# Patient Record
Sex: MALE | Race: BLACK OR AFRICAN AMERICAN | NOT HISPANIC OR LATINO | URBAN - METROPOLITAN AREA
[De-identification: names, ages, dates, MRNs, and addresses within clinical notes are randomized per-mention and may not be internally consistent; named-entity substitution may affect disease eponyms.]

---

## 2017-02-20 ENCOUNTER — EMERGENCY (EMERGENCY)
Facility: HOSPITAL | Age: 52
LOS: 1 days | Discharge: HOME | End: 2017-02-20
Attending: EMERGENCY MEDICINE | Admitting: EMERGENCY MEDICINE
Payer: MEDICAID

## 2017-02-20 VITALS
SYSTOLIC BLOOD PRESSURE: 132 MMHG | RESPIRATION RATE: 18 BRPM | HEIGHT: 72 IN | DIASTOLIC BLOOD PRESSURE: 77 MMHG | TEMPERATURE: 98 F | WEIGHT: 183.65 LBS | HEART RATE: 115 BPM | OXYGEN SATURATION: 98 %

## 2017-02-20 VITALS
RESPIRATION RATE: 18 BRPM | TEMPERATURE: 98 F | OXYGEN SATURATION: 95 % | SYSTOLIC BLOOD PRESSURE: 115 MMHG | DIASTOLIC BLOOD PRESSURE: 65 MMHG | HEART RATE: 105 BPM

## 2017-02-20 DIAGNOSIS — R05 COUGH: ICD-10-CM

## 2017-02-20 DIAGNOSIS — R10.13 EPIGASTRIC PAIN: ICD-10-CM

## 2017-02-20 DIAGNOSIS — K92.0 HEMATEMESIS: ICD-10-CM

## 2017-02-20 DIAGNOSIS — F10.20 ALCOHOL DEPENDENCE, UNCOMPLICATED: ICD-10-CM

## 2017-02-20 LAB
ALBUMIN SERPL ELPH-MCNC: 3.2 G/DL — LOW (ref 3.4–5)
ALP SERPL-CCNC: 94 U/L — SIGNIFICANT CHANGE UP (ref 40–120)
ALT FLD-CCNC: 52 U/L — HIGH (ref 12–42)
ANION GAP SERPL CALC-SCNC: 12 MMOL/L — SIGNIFICANT CHANGE UP (ref 9–16)
AST SERPL-CCNC: 74 U/L — HIGH (ref 15–37)
BASOPHILS NFR BLD AUTO: 0.3 % — SIGNIFICANT CHANGE UP (ref 0–2)
BILIRUB SERPL-MCNC: 0.6 MG/DL — SIGNIFICANT CHANGE UP (ref 0.2–1.2)
BUN SERPL-MCNC: 7 MG/DL — SIGNIFICANT CHANGE UP (ref 7–23)
CALCIUM SERPL-MCNC: 8.4 MG/DL — LOW (ref 8.5–10.5)
CHLORIDE SERPL-SCNC: 103 MMOL/L — SIGNIFICANT CHANGE UP (ref 96–108)
CO2 SERPL-SCNC: 23 MMOL/L — SIGNIFICANT CHANGE UP (ref 22–31)
CREAT SERPL-MCNC: 0.65 MG/DL — SIGNIFICANT CHANGE UP (ref 0.5–1.3)
EOSINOPHIL NFR BLD AUTO: 1 % — SIGNIFICANT CHANGE UP (ref 0–6)
GLUCOSE SERPL-MCNC: 96 MG/DL — SIGNIFICANT CHANGE UP (ref 70–99)
HCT VFR BLD CALC: 37.6 % — LOW (ref 39–50)
HCT VFR BLD CALC: 37.6 % — LOW (ref 39–50)
HGB BLD-MCNC: 12.8 G/DL — LOW (ref 13–17)
HGB BLD-MCNC: 12.8 G/DL — LOW (ref 13–17)
LIDOCAIN IGE QN: 186 U/L — SIGNIFICANT CHANGE UP (ref 73–393)
LYMPHOCYTES # BLD AUTO: 14.7 % — SIGNIFICANT CHANGE UP (ref 13–44)
MCHC RBC-ENTMCNC: 32.5 PG — SIGNIFICANT CHANGE UP (ref 27–34)
MCHC RBC-ENTMCNC: 32.8 PG — SIGNIFICANT CHANGE UP (ref 27–34)
MCHC RBC-ENTMCNC: 34 G/DL — SIGNIFICANT CHANGE UP (ref 32–36)
MCHC RBC-ENTMCNC: 34 G/DL — SIGNIFICANT CHANGE UP (ref 32–36)
MCV RBC AUTO: 95.4 FL — SIGNIFICANT CHANGE UP (ref 80–100)
MCV RBC AUTO: 96.4 FL — SIGNIFICANT CHANGE UP (ref 80–100)
MONOCYTES NFR BLD AUTO: 7.4 % — SIGNIFICANT CHANGE UP (ref 2–14)
NEUTROPHILS NFR BLD AUTO: 76.6 % — SIGNIFICANT CHANGE UP (ref 43–77)
PLATELET # BLD AUTO: 187 K/UL — SIGNIFICANT CHANGE UP (ref 150–400)
PLATELET # BLD AUTO: 190 K/UL — SIGNIFICANT CHANGE UP (ref 150–400)
POTASSIUM SERPL-MCNC: 3.5 MMOL/L — SIGNIFICANT CHANGE UP (ref 3.5–5.3)
POTASSIUM SERPL-SCNC: 3.5 MMOL/L — SIGNIFICANT CHANGE UP (ref 3.5–5.3)
PROT SERPL-MCNC: 7.4 G/DL — SIGNIFICANT CHANGE UP (ref 6.4–8.2)
RBC # BLD: 3.9 M/UL — LOW (ref 4.2–5.8)
RBC # BLD: 3.94 M/UL — LOW (ref 4.2–5.8)
RBC # FLD: 14.6 % — SIGNIFICANT CHANGE UP (ref 10.3–16.9)
RBC # FLD: 14.9 % — SIGNIFICANT CHANGE UP (ref 10.3–16.9)
SODIUM SERPL-SCNC: 138 MMOL/L — SIGNIFICANT CHANGE UP (ref 135–145)
WBC # BLD: 10.1 K/UL — SIGNIFICANT CHANGE UP (ref 3.8–10.5)
WBC # BLD: 8.9 K/UL — SIGNIFICANT CHANGE UP (ref 3.8–10.5)
WBC # FLD AUTO: 10.1 K/UL — SIGNIFICANT CHANGE UP (ref 3.8–10.5)
WBC # FLD AUTO: 8.9 K/UL — SIGNIFICANT CHANGE UP (ref 3.8–10.5)

## 2017-02-20 PROCEDURE — 99284 EMERGENCY DEPT VISIT MOD MDM: CPT | Mod: 25

## 2017-02-20 PROCEDURE — 80053 COMPREHEN METABOLIC PANEL: CPT

## 2017-02-20 PROCEDURE — 85025 COMPLETE CBC W/AUTO DIFF WBC: CPT

## 2017-02-20 PROCEDURE — 96374 THER/PROPH/DIAG INJ IV PUSH: CPT

## 2017-02-20 PROCEDURE — 99053 MED SERV 10PM-8AM 24 HR FAC: CPT

## 2017-02-20 PROCEDURE — 85027 COMPLETE CBC AUTOMATED: CPT

## 2017-02-20 PROCEDURE — 83690 ASSAY OF LIPASE: CPT

## 2017-02-20 PROCEDURE — 36415 COLL VENOUS BLD VENIPUNCTURE: CPT

## 2017-02-20 RX ORDER — SODIUM CHLORIDE 9 MG/ML
1000 INJECTION INTRAMUSCULAR; INTRAVENOUS; SUBCUTANEOUS ONCE
Qty: 0 | Refills: 0 | Status: COMPLETED | OUTPATIENT
Start: 2017-02-20 | End: 2017-02-20

## 2017-02-20 RX ORDER — PANTOPRAZOLE SODIUM 20 MG/1
40 TABLET, DELAYED RELEASE ORAL ONCE
Qty: 0 | Refills: 0 | Status: COMPLETED | OUTPATIENT
Start: 2017-02-20 | End: 2017-02-20

## 2017-02-20 RX ADMIN — SODIUM CHLORIDE 2000 MILLILITER(S): 9 INJECTION INTRAMUSCULAR; INTRAVENOUS; SUBCUTANEOUS at 05:23

## 2017-02-20 RX ADMIN — PANTOPRAZOLE SODIUM 40 MILLIGRAM(S): 20 TABLET, DELAYED RELEASE ORAL at 05:23

## 2017-02-20 NOTE — ED PROVIDER NOTE - OBJECTIVE STATEMENT
pt is 52 yr old m alcoholic, denies any other pmhx - c/o vomiting blood.  began around 2 pm yesterday (sunday) as small amt red blood in emesis.  he says he has vomited a total of 4 or 5 times throughout the day, the last time right before coming to the ER.  he says the last time seemed to be a lot of blood but could not quantify it.  he says he has some upper abdominal pain.  denies coffee ground emesis, and denies BRBPR or melena.  he also denies any chest pain or difficulty breathing.  no fever.  he denies any hx of cirrhosis or esophageal varices.  never had an egd/colonoscopy.  he falls asleep mid-sentence and frequently needs to be awakened.  he admits to drinking several beers tonight, and admits to being a little intoxicated.

## 2017-02-20 NOTE — ED PROVIDER NOTE - PROGRESS NOTE DETAILS
Mor: 53 yo prev healthy vomiting with blood in emesis. resting comfortably.  no vomiting/hematemesis since arriving in ED. Daniel: pt was received at sign out from LAMBERT Galloway as pending repeat cbc, to be d/c'd if unchanged; 8:45 am -- pt sleeping, c/o free, no hematemesis, cbc unchanged, pt woken up and agreeable w/d/c

## 2017-02-20 NOTE — ED ADULT NURSE NOTE - OBJECTIVE STATEMENT
pt c/o cough that started Sunday 2 pm and c/o spitting up blood , pt denies any SOB , no chest pain will no dizziness , no night sweats , will continue to monitor

## 2017-02-20 NOTE — ED PROVIDER NOTE - GASTROINTESTINAL, MLM
Abdomen soft, mild epigastric tenderness.  Rectal nontender, light brown stool on HASEEB, guaiac negative.

## 2017-02-20 NOTE — ED PROVIDER NOTE - MEDICAL DECISION MAKING DETAILS
51 yo m with hematemesis and mild epigastric pain.  Seems to be and admits to being somewhat intoxicated.   Abd mild epigastric tenderness.  Guaiac negative.  No evidence of massive GIB.  Will check labs and give PPI. Will allow to sleep and sober up.  If repeat CBC without signficant drop, can DC to follow up with GI as outpatient.

## 2017-02-20 NOTE — ED PROVIDER NOTE - ATTENDING CONTRIBUTION TO CARE
Pt brought in for complaints with blood in emesis, intoxicated appearing. Agree with PA exam as above. A/P: hematemesis, not active, sleeping comfortably in room. Will recheck CBC, if stable, can follow up with oupt GI.

## 2017-10-12 ENCOUNTER — EMERGENCY (EMERGENCY)
Facility: HOSPITAL | Age: 52
LOS: 1 days | Discharge: HOME | End: 2017-10-12
Attending: EMERGENCY MEDICINE | Admitting: EMERGENCY MEDICINE
Payer: SELF-PAY

## 2017-10-12 VITALS
HEART RATE: 98 BPM | SYSTOLIC BLOOD PRESSURE: 127 MMHG | RESPIRATION RATE: 20 BRPM | DIASTOLIC BLOOD PRESSURE: 77 MMHG | TEMPERATURE: 98 F | OXYGEN SATURATION: 93 %

## 2017-10-12 DIAGNOSIS — F10.129 ALCOHOL ABUSE WITH INTOXICATION, UNSPECIFIED: ICD-10-CM

## 2017-10-12 DIAGNOSIS — F17.200 NICOTINE DEPENDENCE, UNSPECIFIED, UNCOMPLICATED: ICD-10-CM

## 2017-10-12 DIAGNOSIS — S00.81XA ABRASION OF OTHER PART OF HEAD, INITIAL ENCOUNTER: ICD-10-CM

## 2017-10-12 DIAGNOSIS — S00.83XA CONTUSION OF OTHER PART OF HEAD, INITIAL ENCOUNTER: ICD-10-CM
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
## 2017-10-12 DIAGNOSIS — R41.82 ALTERED MENTAL STATUS, UNSPECIFIED: ICD-10-CM
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
## 2017-10-12 PROCEDURE — 99284 EMERGENCY DEPT VISIT MOD MDM: CPT

## 2017-10-12 RX ORDER — HALOPERIDOL DECANOATE 100 MG/ML
5 INJECTION INTRAMUSCULAR ONCE
Qty: 0 | Refills: 0 | Status: COMPLETED | OUTPATIENT
Start: 2017-10-12 | End: 2017-10-12

## 2017-10-12 RX ORDER — TETANUS TOXOID, REDUCED DIPHTHERIA TOXOID AND ACELLULAR PERTUSSIS VACCINE, ADSORBED 5; 2.5; 8; 8; 2.5 [IU]/.5ML; [IU]/.5ML; UG/.5ML; UG/.5ML; UG/.5ML
0.5 SUSPENSION INTRAMUSCULAR ONCE
Qty: 0 | Refills: 0 | Status: COMPLETED | OUTPATIENT
Start: 2017-10-12 | End: 2017-10-12

## 2017-10-12 RX ADMIN — TETANUS TOXOID, REDUCED DIPHTHERIA TOXOID AND ACELLULAR PERTUSSIS VACCINE, ADSORBED 0.5 MILLILITER(S): 5; 2.5; 8; 8; 2.5 SUSPENSION INTRAMUSCULAR at 19:14

## 2017-10-12 RX ADMIN — Medication 2 MILLIGRAM(S): at 20:23

## 2017-10-12 RX ADMIN — HALOPERIDOL DECANOATE 5 MILLIGRAM(S): 100 INJECTION INTRAMUSCULAR at 20:23

## 2017-10-12 NOTE — ED PROVIDER NOTE - ATTENDING CONTRIBUTION TO CARE
52 yom bibems for intox, admits to EtOH use, noted small L forehead hematoma.  limited history 2/2 mental status.    agree w/ PA, clinically intoxicated, scalp hematoma, will CT ro injury, protecting airway, will assess for sobriety. 52 yom bibems for intox, admits to EtOH use, noted small L forehead hematoma.  limited history 2/2 mental status.    agree w/ resident, clinically intoxicated, scalp hematoma, will CT ro injury, protecting airway, will assess for sobriety.

## 2017-10-12 NOTE — ED PROVIDER NOTE - PHYSICAL EXAMINATION
PERRLA, breathing comfortably sleeping on stretcher, no evidence of trauma, moves all extremities spontaneously PERRLA, breathing comfortably sleeping on stretcher, left subcutaneous hematoma over left forehead and left abrasion over left eyebrow, moves all extremities spontaneously

## 2017-10-12 NOTE — ED ADULT TRIAGE NOTE - CHIEF COMPLAINT QUOTE
Patient found sleeping on sidewalk by EMS Patient found sleeping on sidewalk by EMS. Suspicious for ETOH abuse.

## 2017-10-12 NOTE — ED PROVIDER NOTE - MEDICAL DECISION MAKING DETAILS
clinically intoxicated, ct as there's possible head trauma, protecting airway, reassess for sobriety

## 2017-10-12 NOTE — ED ADULT NURSE NOTE - OBJECTIVE STATEMENT
Patient presents to the ED with ems, intoxicated, alcohol on breath. Patient noted to have hematoma to right side of head. Arousable to verbal stimuli. Patient presents to the ED with ems, intoxicated, alcohol on breath. Patient noted to have hematoma to right side of head. Arousable to verbal stimuli, slurring speech, reports drinking Budweiser beer.

## 2017-10-12 NOTE — ED PROVIDER NOTE - OBJECTIVE STATEMENT
52M with hx of EtOh abuse. Found sleeping on the street. Brought in for EtOH intoxication. 52M with hx of EtOh abuse. Found sleeping on the street. Brought in for EtOH intoxication. Denies falls, but with eveidence of trauma on his forehead.

## 2017-10-13 VITALS
OXYGEN SATURATION: 95 % | TEMPERATURE: 97 F | HEART RATE: 88 BPM | RESPIRATION RATE: 18 BRPM | SYSTOLIC BLOOD PRESSURE: 122 MMHG | DIASTOLIC BLOOD PRESSURE: 68 MMHG

## 2017-10-13 PROCEDURE — 70450 CT HEAD/BRAIN W/O DYE: CPT | Mod: 26

## 2017-10-13 PROCEDURE — 90715 TDAP VACCINE 7 YRS/> IM: CPT

## 2017-10-13 PROCEDURE — 90471 IMMUNIZATION ADMIN: CPT

## 2017-10-13 PROCEDURE — 96372 THER/PROPH/DIAG INJ SC/IM: CPT

## 2017-10-13 PROCEDURE — 99285 EMERGENCY DEPT VISIT HI MDM: CPT | Mod: 25

## 2017-10-13 PROCEDURE — 82962 GLUCOSE BLOOD TEST: CPT

## 2017-10-13 PROCEDURE — 70450 CT HEAD/BRAIN W/O DYE: CPT

## 2017-10-13 NOTE — ED ADULT NURSE REASSESSMENT NOTE - NS ED NURSE REASSESS COMMENT FT1
pt now aaox3 no deficits no sob no chest pain no n/v.  gait steady.  speech clear.
pt remains asleep.  no distress.
pt returned from ct scan awake with slurred speech.  pt climbing off stretcher.  unable to redirect.  security at bedside.  md made aware.  pt medicated and wrists restraints applied.
pt to ct scan.  awake and at times not following directions.  nurse with pt.
restraints released.  pt asleep.

## 2018-08-09 ENCOUNTER — EMERGENCY (EMERGENCY)
Facility: HOSPITAL | Age: 53
LOS: 1 days | Discharge: LEFT BEFORE DISCHARGE | End: 2018-08-09
Attending: EMERGENCY MEDICINE | Admitting: EMERGENCY MEDICINE
Payer: MEDICAID

## 2018-08-09 VITALS
OXYGEN SATURATION: 96 % | HEART RATE: 115 BPM | SYSTOLIC BLOOD PRESSURE: 149 MMHG | DIASTOLIC BLOOD PRESSURE: 102 MMHG | WEIGHT: 217.6 LBS | RESPIRATION RATE: 22 BRPM | TEMPERATURE: 98 F

## 2018-08-09 PROCEDURE — 99283 EMERGENCY DEPT VISIT LOW MDM: CPT

## 2018-08-09 RX ORDER — SODIUM CHLORIDE 9 MG/ML
1000 INJECTION INTRAMUSCULAR; INTRAVENOUS; SUBCUTANEOUS ONCE
Qty: 0 | Refills: 0 | Status: DISCONTINUED | OUTPATIENT
Start: 2018-08-09 | End: 2018-08-13

## 2018-08-09 NOTE — ED PROVIDER NOTE - OBJECTIVE STATEMENT
53 m eloped prior to eval- states I need air for a cigarette, pleaded w px to stay, says he will return  admits to drinking 2 beers- then stood up and ambulated out w stable gait  was not evaluated fully

## 2018-08-10 ENCOUNTER — INPATIENT (INPATIENT)
Facility: HOSPITAL | Age: 53
LOS: 0 days | Discharge: LEFT BEFORE DISCHARGE | DRG: 558 | End: 2018-08-10
Attending: INTERNAL MEDICINE | Admitting: INTERNAL MEDICINE
Payer: MEDICAID

## 2018-08-10 VITALS
DIASTOLIC BLOOD PRESSURE: 89 MMHG | OXYGEN SATURATION: 96 % | TEMPERATURE: 98 F | HEART RATE: 116 BPM | RESPIRATION RATE: 18 BRPM | SYSTOLIC BLOOD PRESSURE: 143 MMHG

## 2018-08-10 VITALS — TEMPERATURE: 98 F

## 2018-08-10 DIAGNOSIS — F10.20 ALCOHOL DEPENDENCE, UNCOMPLICATED: ICD-10-CM

## 2018-08-10 DIAGNOSIS — R07.9 CHEST PAIN, UNSPECIFIED: ICD-10-CM

## 2018-08-10 DIAGNOSIS — M62.82 RHABDOMYOLYSIS: ICD-10-CM

## 2018-08-10 LAB
CK MB CFR SERPL CALC: 69.7 NG/ML — HIGH (ref 0–6.7)
TROPONIN T SERPL-MCNC: <0.01 NG/ML — SIGNIFICANT CHANGE UP (ref 0–0.01)

## 2018-08-10 PROCEDURE — 36415 COLL VENOUS BLD VENIPUNCTURE: CPT

## 2018-08-10 PROCEDURE — 80048 BASIC METABOLIC PNL TOTAL CA: CPT

## 2018-08-10 PROCEDURE — 80053 COMPREHEN METABOLIC PANEL: CPT

## 2018-08-10 PROCEDURE — 85610 PROTHROMBIN TIME: CPT

## 2018-08-10 PROCEDURE — 99285 EMERGENCY DEPT VISIT HI MDM: CPT | Mod: 25

## 2018-08-10 PROCEDURE — 84484 ASSAY OF TROPONIN QUANT: CPT

## 2018-08-10 PROCEDURE — 85025 COMPLETE CBC W/AUTO DIFF WBC: CPT

## 2018-08-10 PROCEDURE — 71046 X-RAY EXAM CHEST 2 VIEWS: CPT | Mod: 26

## 2018-08-10 PROCEDURE — 71046 X-RAY EXAM CHEST 2 VIEWS: CPT

## 2018-08-10 PROCEDURE — 93005 ELECTROCARDIOGRAM TRACING: CPT

## 2018-08-10 PROCEDURE — 93010 ELECTROCARDIOGRAM REPORT: CPT

## 2018-08-10 PROCEDURE — 99223 1ST HOSP IP/OBS HIGH 75: CPT | Mod: AI

## 2018-08-10 PROCEDURE — 82803 BLOOD GASES ANY COMBINATION: CPT

## 2018-08-10 PROCEDURE — 85730 THROMBOPLASTIN TIME PARTIAL: CPT

## 2018-08-10 PROCEDURE — 82550 ASSAY OF CK (CPK): CPT

## 2018-08-10 PROCEDURE — 80307 DRUG TEST PRSMV CHEM ANLYZR: CPT

## 2018-08-10 PROCEDURE — 82553 CREATINE MB FRACTION: CPT

## 2018-08-10 PROCEDURE — 83735 ASSAY OF MAGNESIUM: CPT

## 2018-08-10 PROCEDURE — 83690 ASSAY OF LIPASE: CPT

## 2018-08-10 RX ORDER — SODIUM CHLORIDE 9 MG/ML
1000 INJECTION INTRAMUSCULAR; INTRAVENOUS; SUBCUTANEOUS
Qty: 0 | Refills: 0 | Status: DISCONTINUED | OUTPATIENT
Start: 2018-08-10 | End: 2018-08-10

## 2018-08-10 RX ORDER — FOLIC ACID 0.8 MG
1 TABLET ORAL
Qty: 0 | Refills: 0 | COMMUNITY
Start: 2018-08-10

## 2018-08-10 RX ORDER — FOLIC ACID 0.8 MG
1 TABLET ORAL DAILY
Qty: 0 | Refills: 0 | Status: DISCONTINUED | OUTPATIENT
Start: 2018-08-10 | End: 2018-08-10

## 2018-08-10 RX ORDER — HEPARIN SODIUM 5000 [USP'U]/ML
5000 INJECTION INTRAVENOUS; SUBCUTANEOUS EVERY 8 HOURS
Qty: 0 | Refills: 0 | Status: DISCONTINUED | OUTPATIENT
Start: 2018-08-10 | End: 2018-08-10

## 2018-08-10 RX ORDER — THIAMINE MONONITRATE (VIT B1) 100 MG
100 TABLET ORAL ONCE
Qty: 0 | Refills: 0 | Status: DISCONTINUED | OUTPATIENT
Start: 2018-08-10 | End: 2018-08-10

## 2018-08-10 RX ORDER — THIAMINE MONONITRATE (VIT B1) 100 MG
1 TABLET ORAL
Qty: 0 | Refills: 0 | COMMUNITY
Start: 2018-08-10

## 2018-08-10 RX ORDER — THIAMINE MONONITRATE (VIT B1) 100 MG
100 TABLET ORAL DAILY
Qty: 0 | Refills: 0 | Status: DISCONTINUED | OUTPATIENT
Start: 2018-08-10 | End: 2018-08-10

## 2018-08-10 RX ORDER — FOLIC ACID 0.8 MG
1 TABLET ORAL ONCE
Qty: 0 | Refills: 0 | Status: DISCONTINUED | OUTPATIENT
Start: 2018-08-10 | End: 2018-08-10

## 2018-08-10 RX ORDER — SODIUM CHLORIDE 9 MG/ML
1500 INJECTION INTRAMUSCULAR; INTRAVENOUS; SUBCUTANEOUS ONCE
Qty: 0 | Refills: 0 | Status: COMPLETED | OUTPATIENT
Start: 2018-08-10 | End: 2018-08-10

## 2018-08-10 RX ORDER — SODIUM CHLORIDE 9 MG/ML
1000 INJECTION INTRAMUSCULAR; INTRAVENOUS; SUBCUTANEOUS ONCE
Qty: 0 | Refills: 0 | Status: COMPLETED | OUTPATIENT
Start: 2018-08-10 | End: 2018-08-10

## 2018-08-10 RX ADMIN — SODIUM CHLORIDE 1500 MILLILITER(S): 9 INJECTION INTRAMUSCULAR; INTRAVENOUS; SUBCUTANEOUS at 03:20

## 2018-08-10 RX ADMIN — SODIUM CHLORIDE 250 MILLILITER(S): 9 INJECTION INTRAMUSCULAR; INTRAVENOUS; SUBCUTANEOUS at 05:29

## 2018-08-10 RX ADMIN — SODIUM CHLORIDE 1000 MILLILITER(S): 9 INJECTION INTRAMUSCULAR; INTRAVENOUS; SUBCUTANEOUS at 03:19

## 2018-08-10 NOTE — ED PROVIDER NOTE - ATTENDING CONTRIBUTION TO CARE
53M hx etoh abuse, c/o chest pain. pt admits to drinking tonight.  no SOB. no vomiting.  pt triaged yesterday for chest pain as well, however left without being seen.   gen- nad  heent- ncat, clear conj  cv -rrr  lungs -ctab  abd - soft, nt, nd  ext -wwp  neuro -esparza, wakes to touch  no acute st/t wave changes on EKG, elevated cpk c/w rhabdo.  giving iv hydration, will admit for continued monitoring

## 2018-08-10 NOTE — H&P ADULT - NSHPLABSRESULTS_GEN_ALL_CORE
14.3   9.0   )-----------( 344      ( 10 Aug 2018 02:37 )             40.9       08-10    139  |  101  |  6<L>  ----------------------------<  116<H>  4.3   |  19<L>  |  0.74    Ca    8.1<L>      10 Aug 2018 04:34  Mg     2.3     08-10    TPro  8.0  /  Alb  4.4  /  TBili  0.3  /  DBili  x   /  AST  176<H>  /  ALT  44  /  AlkPhos  82  08-10      PT/INR - ( 10 Aug 2018 02:37 )   PT: 10.4 sec;   INR: 0.94          PTT - ( 10 Aug 2018 02:37 )  PTT:29.7 sec    CARDIAC MARKERS ( 10 Aug 2018 04:34 )  x     / x     / 9629 U/L / x     / x      CARDIAC MARKERS ( 10 Aug 2018 02:37 )  x     / 0.02 ng/mL / 22359 U/L / x     / 77.0 ng/mL            EKG: Sinus tach at 107 BPM

## 2018-08-10 NOTE — DISCHARGE NOTE ADULT - HOSPITAL COURSE
52 y/o male POOR HISTORIAN, heavy ETOH user, current smoker,  undomiciled who presented to [facility] ED on 8/10 AM with c/o non-radiating SSCP described as tightness of 5/10 intensity with associated full body aches and increased fatigue whenever he walks x 1 day.   Pt was recently discharged from [facility] ER on 8/9/18 at 5P after he presented to ED with c/o CP but with normal EKG and labs WNL. Patient reports after being discharged , he then walked back and forth between hospital and [address] [place] [address] while drinking beer and smoking cigarettes and then called 911 after again developing CP with new onset body aches and fatigue. Patient noted to be drinking Budweiser can in ED and smoking a cigarette in bathroom overnight which ER staff discarded.  Denies SOB, dizziness, diaphoresis, palpitations, orthopnea, PND, LE edema, syncope, N/V, abdominal pain. Upon admit, temp 98F,  BPM, /89, RR 18, SpO2 96% on RA. [place] , [place] [address], CKMB 77, trop 0.02, alcohol level 291.  ECG revealed sinus tach at 107 BPM without any ST segment changes or TWI. While in ED, patient received IV NS 2.5 L bolus and started on IV NS @ 250 cc/hr x 4 hours. Patient admitted to 5U to r/o ACS, ECHO and treatment of rhabdomyolysis. 2nd trop 0.01; [place] [address], CKMB 69.7. Pt. seen and examined at bedside today am. Pt. denies any CP, SOB, dizziness, palpitations, b/l hand tremors noted on extending the hands. VS HR ; //88). Also, pt. found to be smoking in the bathroom today am. During morning rounds, pt. was seen leaving the hospital fully dressed; pt. was then approached and said that he wanted to leave; he signed out against medical advice; risk and benefits of leaving AMA was explained to pt. which he states he understands and he expressed he wanted to leave without any discharge papers.

## 2018-08-10 NOTE — H&P ADULT - RS GEN PE MLT RESP DETAILS PC
Mild rales to LLL, right lung CTA without w/r/r normal/Mild rales to LLL, right lung CTA without w/r/r/airway patent/good air movement/breath sounds equal

## 2018-08-10 NOTE — H&P ADULT - PROBLEM SELECTOR PLAN 2
Pt with complaints of [place] [address], CKMB 77  - f/u urine lytes, myoglobin  - Alcohol level 291, f/u Utox  - Pt received 2.5 L IV NS in ED and started on IV NS @ 250 cc/hr x 4 hours  - f/u repeat CK, CKMB at AM, 10am Pt with complaints of body aches [place]  - , [place] [address], CKMB 77  - f/u urine lytes, myoglobin  - Alcohol level 291, f/u Utox  - Pt received 2.5 L IV NS in ED and started on IV NS @ 250 cc/hr x 4 hours  - f/u repeat CK, CKMB at AM, 10am

## 2018-08-10 NOTE — ED PROVIDER NOTE - OBJECTIVE STATEMENT
patient returns to ED complains again of CP x unclarified  number of days after elope d/c earlier yesterday + admits to Smoking and drinking albeit no cardiac HX espoused. History and physical both limited by patient primarily sleeping and stating needing to sleep patient returns to ED complains again of CP x unclarified  number of days after elope d/c earlier yesterday + admits to Smoking and drinking albeit no cardiac HX espoused. Patient admits to full body aching whenever walks and as housing diplaced does walk increased amounts History and physical both limited by patient primarily sleeping and stating needing to sleep. patient returns to ED complains again of CP x unclarified  number of days after elope d/c earlier yesterday + admits to Smoking and drinking etoh albeit no cardiac HX espoused. Patient admits to full body aching whenever walks and as housing displaced does walk increased amounts History and physical both limited by patient primarily sleeping and stating needing to sleep.

## 2018-08-10 NOTE — H&P ADULT - HISTORY OF PRESENT ILLNESS
52 yo male POOR HISTORIAN, heavy ETOH user, undomiciled who presented to [facility] ED on 8/10 AM with c/o non-radiating SSCP described as tightness of 5/10 intensity with associated full body aches and increased fatigue whenever he walks x 1 day. Denies SOB, dizziness, diaphoresis, palpitations, orthopnea, PND, fatigue, LE edema, syncope, N/V, abdominal pain. Upon admit, temp 98F,  BPM, /89, RR 18, SpO2 96% [place] RA. [place] , [place] [address], CKMB 77, trop 0.02, alcohol level 291.  ECG revealed sinus tach at 107 BPM without any ST segment changes or TWI. While in ED, patient received IV NS 2.5 L bolus and started on IV NS @ 250 cc/hr x 4 hours. Patient admitted to 5U to r/o ACS, ECHO and treatment of rhabdomyolysis. 54 yo male POOR HISTORIAN, heavy ETOH user, current smoker,  undomiciled who presented to [facility] ED on 8/10 AM with c/o non-radiating SSCP described as tightness of 5/10 intensity with associated full body aches and increased fatigue whenever he walks x 1 day.   Pt was recently discharged from [facility] ER on 8/9/18 at 5P after he presented to ED with c/o CP but with normal EKG and labs WNL. Patient reports he then walked back and forth between hospital and [address] [place] [address] while drinking beer and smoking cigarettes and then called 911 after again developing CP with new onset body aches and fatigue. Patient noted to be drinking Budweiser can in ED and smoking a cigarette in bathroom overnight which ER staff discarded.  Denies SOB, dizziness, diaphoresis, palpitations, orthopnea, PND, LE edema, syncope, N/V, abdominal pain. Upon admit, temp 98F,  BPM, /89, RR 18, SpO2 96% [place] RA. [place] , [place] [address], CKMB 77, trop 0.02, alcohol level 291.  ECG revealed sinus tach at 107 BPM without any ST segment changes or TWI. While in ED, patient received IV NS 2.5 L bolus and started on IV NS @ 250 cc/hr x 4 hours. Patient admitted to 5U to r/o ACS, ECHO and treatment of rhabdomyolysis.

## 2018-08-10 NOTE — ED PROVIDER NOTE - CARE PLAN
Principal Discharge DX:	Atypical chest pain  Secondary Diagnosis:	Alcoholism /alcohol abuse Principal Discharge DX:	Rhabdomyolysis  Secondary Diagnosis:	Alcoholism /alcohol abuse

## 2018-08-10 NOTE — H&P ADULT - NSHPSOCIALHISTORY_GEN_ALL_CORE
Tiigi 36 May 14, 2018       RE: Fabiola Leaks      To Whom It May Concern,    This is to certify that Aston Randall was at the hospital on Monday May 14, 2018 with her brother who is critically ill. Please feel free to contact my office if you have any questions or concerns. Thank you for your assistance in this matter.       Sincerely,  Kim Weiner RN Current smoker, daily drinker. Denies elicit drug use.

## 2018-08-10 NOTE — DISCHARGE NOTE ADULT - CARE PLAN
Principal Discharge DX:	Discharge planning issues  Goal:	Patient signed out AMA before discharge papers given.

## 2018-08-10 NOTE — H&P ADULT - ATTENDING COMMENTS
Assessment: Patient personally seen and examined myself during rounds with the Physician Assistant/House Staff/Nurse Practitioner   ON DATE 8/10/18  Physician Assistant/House Staff/Nurse Practitioner note read, including vitals, physical findings, laboratory data, and radiological reports.   Revisions included below.   Direct personal management at bed side and extensive interpretation of the data.    Plan was outlined and discussed in details with the Physician Assistant/House Staff/Nurse Practitioner.    Decision making of high complexity   Risk high of complications, morbidity, and/or mortality  Assessment and Action taken for acute disease activity to reflect the level of care provided:  -Hemodynamic evaluation and support  -ACS assessment and treatment as applicable  -Heart failure assessment and treatment as applicable  -Cardiac Telemetry reviewed  -Medication reconciliation  -Review laboratory data  -EKG reviewed - no stemi  -Echo ordered  -Interdisciplinary discussion with IC / EP / HF / CTS teams as needed  TIME SPENT in evaluation and management, reassessments, review and interpretation of labs and x-rays, and hemodynamic management, formulating a plan and coordinating care: ___70____ MIN.  Time does not include procedural time.    Farhat Peoples MD  Cardiology    Mobile: [phone]  Office: [phone]  [address] [place], [place] [address]

## 2018-08-10 NOTE — DISCHARGE NOTE ADULT - PATIENT PORTAL LINK FT
You can access the [facility], offered by [facility], by registering with the following website: [email or web address]

## 2018-08-10 NOTE — H&P ADULT - PROBLEM SELECTOR PLAN 1
Currently CP free and hemodynamically stable  - No known cardiac history  - Trop 0.02. f/u CE AM, 10AM  - EKG revealed sinus tach at 107 BPM without ST segment changes or TWI  - Alcohol level 291, f/u Utox  - f/u ECHO

## 2018-08-10 NOTE — ED PROVIDER NOTE - MEDICAL DECISION MAKING DETAILS
2 nd visit today + labs sent ekg completed though difficult to realize beyond patients seeking a rest between etoh uses -Alert and coordinated when ambulating and sleeping when in stretcher with little else appreciated during visit -> ekg mild tachycardia no acute changes + IVF while in ED 2 nd visit today + labs sent ekg completed though difficult to realize beyond patients seeking a rest between etoh uses and traversing about the city  -Alert and coordinated when ambulating and sleeping when in stretcher with little else appreciated during visit -> ekg mild tachycardia no acute changes + IVF while in ED for appreciated Rhabdomyolysis which is appreciated with patients elevated CPK and consistent with lifestyle thusly explaining muscle pain

## 2018-08-10 NOTE — ED ADULT NURSE NOTE - OBJECTIVE STATEMENT
54 y/o male with hx of alcohol abuse and current smoker arrived to [facility] ER reporting chest pain that worsens with exertion for the past day. pt was evaluated and discharged several hours prior to the same symptoms. Pt verbalized that his last alcohol beverage was 2pm yesterday. Upon assessment, AOB, abdomen soft, lung fields WNL, breathing is equal and unlabored, pulses palpable, pt is sleeping in stretcher but able to arouse. Pt denies headache, dizziness, blurred vision, slurred speech, nausea, vomiting, diarrhea, fever, chills, LOC, SOB, recent injury, and palpitations. EKG performed. Care in progress.

## 2018-08-10 NOTE — ED ADULT NURSE REASSESSMENT NOTE - NS ED NURSE REASSESS COMMENT FT1
Pt requested to go to bathroom then proceed to smoke a cigarette. Pt was quickly informed about hospital policy and proceeded to put the cigarette out. Pt then proceeded to try to consume alcoholic beverage in ER, pt reminded of hospital policy and beverage was removed from ER. care ongoing

## 2018-08-10 NOTE — H&P ADULT - ASSESSMENT
52 yo male POOR HISTORIAN, heavy ETOH user, current smoker,  undomiciled who presented to [facility] ED on 8/10 AM with c/o non-radiating SSCP described as tightness of 5/10 intensity with associated full body aches and increased fatigue whenever he walks x 1 day. Found to have trop of 0.02 and CK of 10,000. Patient admitted to 5U to r/o ACS, ECHO and treatment of presumed rhabdomyolysis.

## 2018-08-10 NOTE — H&P ADULT - PROBLEM SELECTOR PLAN 3
- c/w folic acid, thiamine, MVI  - SW consult    Dispo  -Pending clinical course    -Discuss case with Dr. Peoples in AM Daily drinker, patient seen to be drinking Budweiser in ED  - c/w folic acid, thiamine, MVI  - SW consult    Dispo  -Pending clinical course    -Discuss case with Dr. Peoples in AM

## 2018-08-10 NOTE — DISCHARGE NOTE ADULT - MEDICATION SUMMARY - MEDICATIONS TO TAKE
I will START or STAY ON the medications listed below when I get home from the hospital:    Multiple Vitamins oral tablet  -- 1 tab(s) by mouth once a day  -- Indication: For supplemental    folic acid 1 mg oral tablet  -- 1 tab(s) by mouth once a day  -- Indication: For supplemental    thiamine 100 mg oral tablet  -- 1 tab(s) by mouth once a day  -- Indication: For supplemental

## 2018-08-13 DIAGNOSIS — R07.89 OTHER CHEST PAIN: ICD-10-CM

## 2018-08-14 DIAGNOSIS — R07.2 PRECORDIAL PAIN: ICD-10-CM

## 2018-08-14 DIAGNOSIS — Z53.21 PROCEDURE AND TREATMENT NOT CARRIED OUT DUE TO PATIENT LEAVING PRIOR TO BEING SEEN BY HEALTH CARE PROVIDER: ICD-10-CM

## 2018-08-14 DIAGNOSIS — Z59.0 HOMELESSNESS: ICD-10-CM

## 2018-08-14 DIAGNOSIS — F10.20 ALCOHOL DEPENDENCE, UNCOMPLICATED: ICD-10-CM

## 2018-08-14 DIAGNOSIS — F17.210 NICOTINE DEPENDENCE, CIGARETTES, UNCOMPLICATED: ICD-10-CM
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
## 2018-08-14 DIAGNOSIS — M62.82 RHABDOMYOLYSIS: ICD-10-CM

## 2018-08-14 SDOH — ECONOMIC STABILITY - HOUSING INSECURITY: HOMELESSNESS: Z59.0

## 2018-08-16 ENCOUNTER — EMERGENCY (EMERGENCY)
Facility: HOSPITAL | Age: 53
LOS: 1 days | Discharge: HOME | End: 2018-08-16
Attending: EMERGENCY MEDICINE | Admitting: EMERGENCY MEDICINE
Payer: MEDICAID

## 2018-08-16 VITALS
HEART RATE: 99 BPM | OXYGEN SATURATION: 99 % | DIASTOLIC BLOOD PRESSURE: 79 MMHG | WEIGHT: 315 LBS | RESPIRATION RATE: 18 BRPM | SYSTOLIC BLOOD PRESSURE: 118 MMHG | TEMPERATURE: 99 F

## 2018-08-16 VITALS
HEART RATE: 101 BPM | OXYGEN SATURATION: 97 % | SYSTOLIC BLOOD PRESSURE: 129 MMHG | TEMPERATURE: 98 F | RESPIRATION RATE: 18 BRPM | DIASTOLIC BLOOD PRESSURE: 73 MMHG

## 2018-08-16 PROCEDURE — 99284 EMERGENCY DEPT VISIT MOD MDM: CPT | Mod: 25

## 2018-08-16 PROCEDURE — 82962 GLUCOSE BLOOD TEST: CPT

## 2018-08-16 PROCEDURE — 99285 EMERGENCY DEPT VISIT HI MDM: CPT

## 2018-08-16 NOTE — ED PROVIDER NOTE - OBJECTIVE STATEMENT
To ED secondary to public intoxication with history limited secondary to patient condition. patient well known non compliant patient with ETOH overuse + recent Rhabdo admission also ending in AMA d/c NO report at present of trauma in faqct Hx limited by patient wanting to be left alone     recent d/c note placed below   52 y/o male POOR HISTORIAN, heavy ETOH user, current smoker,  undomiciled who presented to [facility] ED on 8/10 AM with c/o non-radiating SSCP described as tightness of 5/10 intensity with associated full body aches and increased fatigue whenever he walks x 1 day.   Pt was recently discharged from [facility] ER on 8/9/18 at 5P after he presented to ED with c/o CP but with normal EKG and labs WNL. Patient reports after being discharged , he then walked back and forth between hospital and [address] [place] [address] while drinking beer and smoking cigarettes and then called 911 after again developing CP with new onset body aches and fatigue. Patient noted to be drinking Budweiser can in ED and smoking a cigarette in bathroom overnight which ER staff discarded.  Denies SOB, dizziness, diaphoresis, palpitations, orthopnea, PND, LE edema, syncope, N/V, abdominal pain. Upon admit, temp 98F,  BPM, /89, RR 18, SpO2 96% on RA. [place] , [place] [address], CKMB 77, trop 0.02, alcohol level 291.  ECG revealed sinus tach at 107 BPM without any ST segment changes or TWI. While in ED, patient received IV NS 2.5 L bolus and started on IV NS @ 250 cc/hr x 4 hours. Patient admitted to 5U to r/o ACS, ECHO and treatment of rhabdomyolysis. 2nd trop 0.01; [place] [address], CKMB 69.7. Pt. seen and examined at bedside today am. Pt. denies any CP, SOB, dizziness, palpitations, b/l hand tremors noted on extending the hands. VS HR ; //88). Also, pt. found to be smoking in the bathroom today am. During morning rounds, pt. was seen leaving the hospital fully dressed; pt. was then approached and said that he wanted to leave; he signed out against medical

## 2018-08-16 NOTE — ED ADULT NURSE NOTE - OBJECTIVE STATEMENT
pt in er due to unsteady gait. pt admits drinking vodka. pt denies any pain and no visible injury noted. will continue to monitor, awaiting sobriety.

## 2018-08-16 NOTE — ED PROVIDER NOTE - ATTENDING CONTRIBUTION TO CARE
53 yom bibems for EtOH intox, pt admits to alcohol use but denies trauma.    agree w/ PA, clinically intoxicated, no trauma noted on exam, protecting airway, will assess for sobriety.    pt clinically sober, steady gait, pt left prior to dc paperwork done

## 2018-08-16 NOTE — ED ADULT NURSE NOTE - CHIEF COMPLAINT QUOTE
pt BIBA from [facility] and [facility] for AMS ETOH intox admits to drinking denies trauma injury

## 2018-08-16 NOTE — ED ADULT TRIAGE NOTE - CHIEF COMPLAINT QUOTE
pt BIBA from [facility] and [facility] for AMS ETOH intox admits to drinking denies trauma injury

## 2018-08-20 DIAGNOSIS — F17.200 NICOTINE DEPENDENCE, UNSPECIFIED, UNCOMPLICATED: ICD-10-CM

## 2018-08-20 DIAGNOSIS — Z79.899 OTHER LONG TERM (CURRENT) DRUG THERAPY: ICD-10-CM

## 2018-08-20 DIAGNOSIS — R41.82 ALTERED MENTAL STATUS, UNSPECIFIED: ICD-10-CM

## 2018-08-20 DIAGNOSIS — F10.229 ALCOHOL DEPENDENCE WITH INTOXICATION, UNSPECIFIED: ICD-10-CM

## 2018-08-25 ENCOUNTER — EMERGENCY (EMERGENCY)
Facility: HOSPITAL | Age: 53
LOS: 1 days | Discharge: HOME | End: 2018-08-25
Attending: EMERGENCY MEDICINE | Admitting: EMERGENCY MEDICINE
Payer: MEDICAID

## 2018-08-25 VITALS
SYSTOLIC BLOOD PRESSURE: 116 MMHG | OXYGEN SATURATION: 98 % | DIASTOLIC BLOOD PRESSURE: 78 MMHG | RESPIRATION RATE: 18 BRPM | TEMPERATURE: 98 F | HEART RATE: 120 BPM

## 2018-08-25 DIAGNOSIS — R11.10 VOMITING, UNSPECIFIED: ICD-10-CM

## 2018-08-25 RX ORDER — FAMOTIDINE 10 MG/ML
20 INJECTION INTRAVENOUS ONCE
Qty: 0 | Refills: 0 | Status: DISCONTINUED | OUTPATIENT
Start: 2018-08-25 | End: 2018-08-29

## 2018-08-25 RX ORDER — SODIUM CHLORIDE 9 MG/ML
1000 INJECTION INTRAMUSCULAR; INTRAVENOUS; SUBCUTANEOUS ONCE
Qty: 0 | Refills: 0 | Status: DISCONTINUED | OUTPATIENT
Start: 2018-08-25 | End: 2018-08-29

## 2018-08-25 RX ORDER — ONDANSETRON 8 MG/1
4 TABLET, FILM COATED ORAL ONCE
Qty: 0 | Refills: 0 | Status: DISCONTINUED | OUTPATIENT
Start: 2018-08-25 | End: 2018-08-29

## 2018-08-25 NOTE — ED ADULT TRIAGE NOTE - ARRIVAL INFO ADDITIONAL COMMENTS
pt c/o 2 days of vomiting.  pt has extremity jerking while sitting in chair.  when asked about it he says it is because he needs to vomit.  denies pain.  pt denies medical hx but clinical summary in chart states pt has a hx of etoh abuse.

## 2019-01-20 ENCOUNTER — EMERGENCY (EMERGENCY)
Facility: HOSPITAL | Age: 54
LOS: 1 days | Discharge: HOME | End: 2019-01-20
Attending: EMERGENCY MEDICINE | Admitting: EMERGENCY MEDICINE
Payer: MEDICAID

## 2019-01-20 VITALS
TEMPERATURE: 98 F | DIASTOLIC BLOOD PRESSURE: 78 MMHG | SYSTOLIC BLOOD PRESSURE: 142 MMHG | HEART RATE: 84 BPM | RESPIRATION RATE: 16 BRPM | OXYGEN SATURATION: 99 %

## 2019-01-20 VITALS
DIASTOLIC BLOOD PRESSURE: 72 MMHG | TEMPERATURE: 98 F | WEIGHT: 199.96 LBS | OXYGEN SATURATION: 97 % | SYSTOLIC BLOOD PRESSURE: 151 MMHG | HEART RATE: 108 BPM | RESPIRATION RATE: 20 BRPM

## 2019-01-20 DIAGNOSIS — Z79.899 OTHER LONG TERM (CURRENT) DRUG THERAPY: ICD-10-CM

## 2019-01-20 DIAGNOSIS — K29.70 GASTRITIS, UNSPECIFIED, WITHOUT BLEEDING: ICD-10-CM

## 2019-01-20 DIAGNOSIS — K92.0 HEMATEMESIS: ICD-10-CM

## 2019-01-20 DIAGNOSIS — F10.20 ALCOHOL DEPENDENCE, UNCOMPLICATED: ICD-10-CM

## 2019-01-20 LAB
ALBUMIN SERPL ELPH-MCNC: 4.5 G/DL — SIGNIFICANT CHANGE UP (ref 3.3–5)
ALP SERPL-CCNC: 122 U/L — HIGH (ref 40–120)
ALT FLD-CCNC: 108 U/L — HIGH (ref 10–45)
ANION GAP SERPL CALC-SCNC: 21 MMOL/L — HIGH (ref 5–17)
APTT BLD: 26.1 SEC — LOW (ref 27.5–36.3)
AST SERPL-CCNC: 226 U/L — HIGH (ref 10–40)
BASOPHILS NFR BLD AUTO: 0.2 % — SIGNIFICANT CHANGE UP (ref 0–2)
BILIRUB SERPL-MCNC: 0.7 MG/DL — SIGNIFICANT CHANGE UP (ref 0.2–1.2)
BUN SERPL-MCNC: 12 MG/DL — SIGNIFICANT CHANGE UP (ref 7–23)
CALCIUM SERPL-MCNC: 9.2 MG/DL — SIGNIFICANT CHANGE UP (ref 8.4–10.5)
CHLORIDE SERPL-SCNC: 88 MMOL/L — LOW (ref 96–108)
CO2 SERPL-SCNC: 22 MMOL/L — SIGNIFICANT CHANGE UP (ref 22–31)
CREAT SERPL-MCNC: 0.73 MG/DL — SIGNIFICANT CHANGE UP (ref 0.5–1.3)
ETHANOL SERPL-MCNC: 247 MG/DL — HIGH (ref 0–10)
GLUCOSE SERPL-MCNC: 90 MG/DL — SIGNIFICANT CHANGE UP (ref 70–99)
HCT VFR BLD CALC: 39.2 % — SIGNIFICANT CHANGE UP (ref 39–50)
HGB BLD-MCNC: 13.9 G/DL — SIGNIFICANT CHANGE UP (ref 13–17)
INR BLD: 0.93 — SIGNIFICANT CHANGE UP (ref 0.88–1.16)
LYMPHOCYTES # BLD AUTO: 29.2 % — SIGNIFICANT CHANGE UP (ref 13–44)
MCHC RBC-ENTMCNC: 31.2 PG — SIGNIFICANT CHANGE UP (ref 27–34)
MCHC RBC-ENTMCNC: 35.5 G/DL — SIGNIFICANT CHANGE UP (ref 32–36)
MCV RBC AUTO: 88.1 FL — SIGNIFICANT CHANGE UP (ref 80–100)
MONOCYTES NFR BLD AUTO: 6.7 % — SIGNIFICANT CHANGE UP (ref 2–14)
NEUTROPHILS NFR BLD AUTO: 63.9 % — SIGNIFICANT CHANGE UP (ref 43–77)
PLATELET # BLD AUTO: 221 K/UL — SIGNIFICANT CHANGE UP (ref 150–400)
POTASSIUM SERPL-MCNC: 4.1 MMOL/L — SIGNIFICANT CHANGE UP (ref 3.5–5.3)
POTASSIUM SERPL-SCNC: 4.1 MMOL/L — SIGNIFICANT CHANGE UP (ref 3.5–5.3)
PROT SERPL-MCNC: 8.7 G/DL — HIGH (ref 6–8.3)
PROTHROM AB SERPL-ACNC: 10.5 SEC — SIGNIFICANT CHANGE UP (ref 10–12.9)
RBC # BLD: 4.45 M/UL — SIGNIFICANT CHANGE UP (ref 4.2–5.8)
RBC # FLD: 16 % — SIGNIFICANT CHANGE UP (ref 10.3–16.9)
SODIUM SERPL-SCNC: 131 MMOL/L — LOW (ref 135–145)
WBC # BLD: 5.1 K/UL — SIGNIFICANT CHANGE UP (ref 3.8–10.5)
WBC # FLD AUTO: 5.1 K/UL — SIGNIFICANT CHANGE UP (ref 3.8–10.5)

## 2019-01-20 PROCEDURE — 85610 PROTHROMBIN TIME: CPT

## 2019-01-20 PROCEDURE — 83690 ASSAY OF LIPASE: CPT

## 2019-01-20 PROCEDURE — 80053 COMPREHEN METABOLIC PANEL: CPT

## 2019-01-20 PROCEDURE — 76705 ECHO EXAM OF ABDOMEN: CPT

## 2019-01-20 PROCEDURE — 80307 DRUG TEST PRSMV CHEM ANLYZR: CPT

## 2019-01-20 PROCEDURE — 96365 THER/PROPH/DIAG IV INF INIT: CPT

## 2019-01-20 PROCEDURE — 99284 EMERGENCY DEPT VISIT MOD MDM: CPT | Mod: 25

## 2019-01-20 PROCEDURE — 85730 THROMBOPLASTIN TIME PARTIAL: CPT

## 2019-01-20 PROCEDURE — 76705 ECHO EXAM OF ABDOMEN: CPT | Mod: 26

## 2019-01-20 PROCEDURE — 96375 TX/PRO/DX INJ NEW DRUG ADDON: CPT

## 2019-01-20 PROCEDURE — 36415 COLL VENOUS BLD VENIPUNCTURE: CPT

## 2019-01-20 PROCEDURE — 85025 COMPLETE CBC W/AUTO DIFF WBC: CPT

## 2019-01-20 RX ORDER — SODIUM CHLORIDE 9 MG/ML
1000 INJECTION INTRAMUSCULAR; INTRAVENOUS; SUBCUTANEOUS ONCE
Qty: 0 | Refills: 0 | Status: COMPLETED | OUTPATIENT
Start: 2019-01-20 | End: 2019-01-20

## 2019-01-20 RX ORDER — SODIUM CHLORIDE 9 MG/ML
1000 INJECTION, SOLUTION INTRAVENOUS
Qty: 0 | Refills: 0 | Status: DISCONTINUED | OUTPATIENT
Start: 2019-01-20 | End: 2019-01-20

## 2019-01-20 RX ORDER — SODIUM CHLORIDE 9 MG/ML
1000 INJECTION, SOLUTION INTRAVENOUS
Qty: 0 | Refills: 0 | Status: COMPLETED | OUTPATIENT
Start: 2019-01-20 | End: 2019-01-20

## 2019-01-20 RX ORDER — PANTOPRAZOLE SODIUM 20 MG/1
40 TABLET, DELAYED RELEASE ORAL ONCE
Qty: 0 | Refills: 0 | Status: COMPLETED | OUTPATIENT
Start: 2019-01-20 | End: 2019-01-20

## 2019-01-20 RX ORDER — ONDANSETRON 8 MG/1
4 TABLET, FILM COATED ORAL ONCE
Qty: 0 | Refills: 0 | Status: COMPLETED | OUTPATIENT
Start: 2019-01-20 | End: 2019-01-20

## 2019-01-20 RX ADMIN — SODIUM CHLORIDE 2000 MILLILITER(S): 9 INJECTION INTRAMUSCULAR; INTRAVENOUS; SUBCUTANEOUS at 04:01

## 2019-01-20 RX ADMIN — ONDANSETRON 4 MILLIGRAM(S): 8 TABLET, FILM COATED ORAL at 04:01

## 2019-01-20 RX ADMIN — SODIUM CHLORIDE 1000 MILLILITER(S): 9 INJECTION, SOLUTION INTRAVENOUS at 06:55

## 2019-01-20 RX ADMIN — SODIUM CHLORIDE 150 MILLILITER(S): 9 INJECTION, SOLUTION INTRAVENOUS at 06:03

## 2019-01-20 RX ADMIN — PANTOPRAZOLE SODIUM 40 MILLIGRAM(S): 20 TABLET, DELAYED RELEASE ORAL at 04:01

## 2019-01-20 NOTE — ED ADULT NURSE NOTE - OBJECTIVE STATEMENT
pt. presents with presentation of reported vomiting blood x 3 episodes tonight, reports that he drinks daily and has consumed 3 beers today, nad at present, requesting some socks and pillow, and blanket, which were provided

## 2019-01-20 NOTE — ED PROVIDER NOTE - ATTENDING CONTRIBUTION TO CARE
54 year old male with history of ETOH abuse presents to ED with concern for hematemesis today x 2 episodes.  Mild epig ttp.  On my face to face ED eval, patient is non toxic in appearance.  HRRR, lungs clear.  No peritnoeal signs on abd exam, abd soft w mild ttp in epig area.  Labs reviewed - elevated liver function tests noted - of note, patient is a chronic drinker.  Hgb stable.  No emesis in ED.  Will plan for discharge with instruction for prompt PCP and GI follow up.  Patient is to return to ED immediately should symptoms worsen or if he has any concern prior to this recommended follow up.  Patient is aware of plan and verbalizes his understanding.

## 2019-01-20 NOTE — ED PROVIDER NOTE - MEDICAL DECISION MAKING DETAILS
55 yo male, admits drinking and smoking, in the ER after an episode of vomiting yesterday , possible vomiting with blood? AOB+, pt in NAD, VSS, labs done- H/H stable, Pt received IVF, PPI, antiemetics, "banana bag", US done as he had mid epigastric tenderness and some RUQ tenderness on exam. No findings to suspect acute cholecystitis. Pt tolerate PO. importance to f/u with GI and f/u for detox explained. Pt stable for discharge.

## 2019-01-20 NOTE — ED ADULT NURSE NOTE - NSIMPLEMENTINTERV_GEN_ALL_ED
Implemented All Universal Safety Interventions:  [place] to call system. Call bell, personal items and telephone within reach. Instruct patient to call for assistance. Room bathroom lighting operational. Non-slip footwear when patient is off stretcher. Physically safe environment: no spills, clutter or unnecessary equipment. Stretcher in lowest position, wheels locked, appropriate side rails in place.

## 2019-01-20 NOTE — ED PROVIDER NOTE - NSFOLLOWUPCLINICS_GEN_ALL_ED_FT
[facility]  Family Medicine  [facility]  [place], [place] [facility]  Phone: [phone]  Fax:   Follow Up Time:

## 2019-01-20 NOTE — ED PROVIDER NOTE - GASTROINTESTINAL, MLM
Abdomen soft, mid epigastric tender, RUQ tender+,  no guarding, no rebound, no RLQ or LLQ tenderness,

## 2019-01-20 NOTE — ED PROVIDER NOTE - PROGRESS NOTE DETAILS
pt improved, no evidence of acute cholecystitis, VSS, tolerate PO well and stable for discharge. F/u with Gastroenterologist recommended.

## 2019-01-20 NOTE — ED ADULT NURSE REASSESSMENT NOTE - NS ED NURSE REASSESS COMMENT FT1
PA at bedside for evaluation
PA at bedside with U/S tech, speaking with pt. regarding U/S
Security at bedside to escort pt. out of department
US tech at bedside, pt. refuses to go to U/S, states "I just want to sleep," Provider notified
dispo. as noted, pt. refusing to leave, security notified
pt. ambulated out of ed with steady gait noted, under 
pt. asleep, nad, assessment on-going, awaiting further orders
pt. medicated as noted, heidi. well, assessment on-going
pt. remains asleep, nad, assessment on-going, still awaiting banana bag from pharmacy
pt. asleep, rt recumbent, awaiting provider eval., nad, assessment on-going

## 2019-01-20 NOTE — ED PROVIDER NOTE - OBJECTIVE STATEMENT
53 yo male in the ER c/o vomiting with blood today. pt reports he  had nausea and vomiting multiple times yesterday, noted blood in his vomit once. Pt also admit drinking alcohol frequently. denies smoking, denies using illicit drugs, denies diarrhea or constipations, denies blood in his stool.

## 2019-01-21 ENCOUNTER — EMERGENCY (EMERGENCY)
Facility: HOSPITAL | Age: 54
LOS: 1 days | Discharge: HOME | End: 2019-01-21
Attending: EMERGENCY MEDICINE | Admitting: EMERGENCY MEDICINE
Payer: MEDICAID

## 2019-01-21 VITALS
HEART RATE: 111 BPM | WEIGHT: 197.98 LBS | DIASTOLIC BLOOD PRESSURE: 70 MMHG | OXYGEN SATURATION: 100 % | RESPIRATION RATE: 18 BRPM | TEMPERATURE: 98 F | SYSTOLIC BLOOD PRESSURE: 171 MMHG

## 2019-01-21 VITALS
HEART RATE: 98 BPM | RESPIRATION RATE: 18 BRPM | DIASTOLIC BLOOD PRESSURE: 60 MMHG | SYSTOLIC BLOOD PRESSURE: 108 MMHG | TEMPERATURE: 98 F | OXYGEN SATURATION: 100 %

## 2019-01-21 LAB
ALBUMIN SERPL ELPH-MCNC: 3.6 G/DL — SIGNIFICANT CHANGE UP (ref 3.3–5)
ALP SERPL-CCNC: 115 U/L — SIGNIFICANT CHANGE UP (ref 40–120)
ALT FLD-CCNC: 110 U/L — HIGH (ref 10–45)
ANION GAP SERPL CALC-SCNC: 15 MMOL/L — SIGNIFICANT CHANGE UP (ref 5–17)
AST SERPL-CCNC: 225 U/L — HIGH (ref 10–40)
BASOPHILS NFR BLD AUTO: 0.2 % — SIGNIFICANT CHANGE UP (ref 0–2)
BILIRUB SERPL-MCNC: 0.5 MG/DL — SIGNIFICANT CHANGE UP (ref 0.2–1.2)
BUN SERPL-MCNC: 12 MG/DL — SIGNIFICANT CHANGE UP (ref 7–23)
CALCIUM SERPL-MCNC: 9 MG/DL — SIGNIFICANT CHANGE UP (ref 8.4–10.5)
CHLORIDE SERPL-SCNC: 98 MMOL/L — SIGNIFICANT CHANGE UP (ref 96–108)
CO2 SERPL-SCNC: 22 MMOL/L — SIGNIFICANT CHANGE UP (ref 22–31)
CREAT SERPL-MCNC: 0.73 MG/DL — SIGNIFICANT CHANGE UP (ref 0.5–1.3)
EOSINOPHIL NFR BLD AUTO: 0.5 % — SIGNIFICANT CHANGE UP (ref 0–6)
GLUCOSE SERPL-MCNC: 82 MG/DL — SIGNIFICANT CHANGE UP (ref 70–99)
HCT VFR BLD CALC: 37.1 % — LOW (ref 39–50)
HGB BLD-MCNC: 12.6 G/DL — LOW (ref 13–17)
LYMPHOCYTES # BLD AUTO: 23.7 % — SIGNIFICANT CHANGE UP (ref 13–44)
MCHC RBC-ENTMCNC: 31 PG — SIGNIFICANT CHANGE UP (ref 27–34)
MCHC RBC-ENTMCNC: 34 G/DL — SIGNIFICANT CHANGE UP (ref 32–36)
MCV RBC AUTO: 91.4 FL — SIGNIFICANT CHANGE UP (ref 80–100)
MONOCYTES NFR BLD AUTO: 8.3 % — SIGNIFICANT CHANGE UP (ref 2–14)
NEUTROPHILS NFR BLD AUTO: 67.3 % — SIGNIFICANT CHANGE UP (ref 43–77)
PLATELET # BLD AUTO: 170 K/UL — SIGNIFICANT CHANGE UP (ref 150–400)
POTASSIUM SERPL-MCNC: 3.9 MMOL/L — SIGNIFICANT CHANGE UP (ref 3.5–5.3)
POTASSIUM SERPL-SCNC: 3.9 MMOL/L — SIGNIFICANT CHANGE UP (ref 3.5–5.3)
PROT SERPL-MCNC: 7.5 G/DL — SIGNIFICANT CHANGE UP (ref 6–8.3)
RBC # BLD: 4.06 M/UL — LOW (ref 4.2–5.8)
RBC # FLD: 16.3 % — SIGNIFICANT CHANGE UP (ref 10.3–16.9)
SODIUM SERPL-SCNC: 135 MMOL/L — SIGNIFICANT CHANGE UP (ref 135–145)
TROPONIN T SERPL-MCNC: <0.01 NG/ML — SIGNIFICANT CHANGE UP (ref 0–0.01)
WBC # BLD: 6 K/UL — SIGNIFICANT CHANGE UP (ref 3.8–10.5)
WBC # FLD AUTO: 6 K/UL — SIGNIFICANT CHANGE UP (ref 3.8–10.5)

## 2019-01-21 PROCEDURE — 85025 COMPLETE CBC W/AUTO DIFF WBC: CPT

## 2019-01-21 PROCEDURE — 84484 ASSAY OF TROPONIN QUANT: CPT

## 2019-01-21 PROCEDURE — 80053 COMPREHEN METABOLIC PANEL: CPT

## 2019-01-21 PROCEDURE — 36415 COLL VENOUS BLD VENIPUNCTURE: CPT

## 2019-01-21 PROCEDURE — 99285 EMERGENCY DEPT VISIT HI MDM: CPT | Mod: 25

## 2019-01-21 PROCEDURE — 99284 EMERGENCY DEPT VISIT MOD MDM: CPT

## 2019-01-21 PROCEDURE — 96374 THER/PROPH/DIAG INJ IV PUSH: CPT

## 2019-01-21 RX ORDER — KETOROLAC TROMETHAMINE 30 MG/ML
30 SYRINGE (ML) INJECTION ONCE
Qty: 0 | Refills: 0 | Status: DISCONTINUED | OUTPATIENT
Start: 2019-01-21 | End: 2019-01-21

## 2019-01-21 RX ORDER — SODIUM CHLORIDE 9 MG/ML
1000 INJECTION INTRAMUSCULAR; INTRAVENOUS; SUBCUTANEOUS ONCE
Qty: 0 | Refills: 0 | Status: COMPLETED | OUTPATIENT
Start: 2019-01-21 | End: 2019-01-21

## 2019-01-21 RX ADMIN — SODIUM CHLORIDE 1000 MILLILITER(S): 9 INJECTION INTRAMUSCULAR; INTRAVENOUS; SUBCUTANEOUS at 18:39

## 2019-01-21 RX ADMIN — Medication 30 MILLIGRAM(S): at 18:39

## 2019-01-21 NOTE — ED ADULT NURSE NOTE - OBJECTIVE STATEMENT
Patient reports anterior, midsternal CP radiating to back x 2 days with assoc non productive cough--denies fevers/chills--speaking in full, complete sentences. Denies nausea/vomiting/diaphoresis.

## 2019-01-21 NOTE — ED PROVIDER NOTE - OBJECTIVE STATEMENT
53M hx etoh abuse, c/o cough and chest pain. pt states L sided chest pain ongoing for past 2 days.  no SOB. no LE swelling. no fevers. no phlegm. no n/v/d. no dizziness.

## 2019-01-21 NOTE — ED ADULT NURSE NOTE - NSIMPLEMENTINTERV_GEN_ALL_ED
Implemented All Universal Safety Interventions:  [place] to call system. Call bell, personal items and telephone within reach. Instruct patient to call for assistance. Room bathroom lighting operational. Non-slip footwear when patient is off stretcher. Physically safe environment: no spills, clutter or unnecessary equipment. Stretcher in lowest position, wheels locked, appropriate side rails in place.

## 2019-01-21 NOTE — ED PROVIDER NOTE - NSFOLLOWUPINSTRUCTIONS_ED_ALL_ED_FT
Chest Pain    Chest pain can be caused by many different conditions which may or may not be dangerous. Causes include heartburn, lung infections, heart attack, blood clot in lungs, skin infections, strain or damage to muscle, cartilage, or bones, etc. In addition to a history and physical examination, an electrocardiogram (ECG) or other lab tests may have been performed to determine the cause of your chest pain. Follow up with your primary care provider or with a cardiologist as instructed.     SEEK IMMEDIATE MEDICAL CARE IF YOU HAVE ANY OF THE FOLLOWING SYMPTOMS: worsening chest pain, coughing up blood, unexplained back/neck/jaw pain, severe abdominal pain, dizziness or lightheadedness, fainting, shortness of breath, sweaty or clammy skin, vomiting, or racing heart beat. These symptoms may represent a serious problem that is an emergency. Do not wait to see if the symptoms will go away. Get medical help right away. Call 911 and do not drive yourself to the hospital.    Alcohol Abuse    Alcohol intoxication occurs when the amount of alcohol that a person has consumed impairs his or her ability to mentally and physically function. Chronic alcohol consumption can also lead to a variety of health issues including neurological disease, stomach disease, heart disease, liver disease, etc. Do not drive after drinking alcohol. Drinking enough alcohol to end up in an Emergency Room suggests you may have an alcohol abuse problem. Seek help at a drug addiction center.    SEEK IMMEDIATE MEDICAL CARE IF YOU HAVE ANY OF THE FOLLOWING SYMPTOMS: seizures, vomiting blood, blood in your stool, lightheadedness/dizziness, or becoming shaky to tremulous when you stop drinking.

## 2019-01-21 NOTE — ED ADULT NURSE NOTE - CHPI ED NUR SYMPTOMS NEG
no shortness of breath/no dizziness/no fever/no congestion/no nausea/no syncope/no diaphoresis/no vomiting

## 2019-01-21 NOTE — ED PROVIDER NOTE - CARE PLAN
Principal Discharge DX:	Chest pain, unspecified type  Secondary Diagnosis:	Alcoholic intoxication without complication

## 2019-01-21 NOTE — ED PROVIDER NOTE - MEDICAL DECISION MAKING DETAILS
cough, chest pain ongoing for 2 days, no resp distress, no airway compromise  -check labs, ivf, Toradol, ekg, cxr

## 2019-01-21 NOTE — ED PROVIDER NOTE - PROGRESS NOTE DETAILS
pt refused xray.  tachycardia improved with ivf. pt states feeling better, doubt ACS as ongoing 2 days, negative troponin.  no hypoxia, speaking in full sentences, doubt PE.  recommend PMD f/u  I have discussed the discharge plan with the patient. The patient agrees with the plan, as discussed.  The patient understands Emergency Department diagnosis is a preliminary diagnosis often based on limited information and that the patient must adhere to the follow-up plan as discussed.  The patient understands that if the symptoms worsen the patient may return to the Emergency Department at any time for further evaluation and treatment.

## 2019-01-25 DIAGNOSIS — F10.120 ALCOHOL ABUSE WITH INTOXICATION, UNCOMPLICATED: ICD-10-CM

## 2019-01-25 DIAGNOSIS — R07.89 OTHER CHEST PAIN: ICD-10-CM

## 2019-01-25 DIAGNOSIS — R05 COUGH: ICD-10-CM

## 2019-01-25 DIAGNOSIS — F17.200 NICOTINE DEPENDENCE, UNSPECIFIED, UNCOMPLICATED: ICD-10-CM

## 2019-01-25 DIAGNOSIS — Z79.899 OTHER LONG TERM (CURRENT) DRUG THERAPY: ICD-10-CM

## 2019-02-03 ENCOUNTER — EMERGENCY (EMERGENCY)
Facility: HOSPITAL | Age: 54
LOS: 1 days | Discharge: HOME | End: 2019-02-03
Attending: EMERGENCY MEDICINE | Admitting: EMERGENCY MEDICINE

## 2019-02-03 VITALS
WEIGHT: 199.96 LBS | OXYGEN SATURATION: 98 % | RESPIRATION RATE: 18 BRPM | DIASTOLIC BLOOD PRESSURE: 82 MMHG | SYSTOLIC BLOOD PRESSURE: 152 MMHG | HEART RATE: 110 BPM | TEMPERATURE: 98 F | HEIGHT: 73 IN

## 2019-02-03 DIAGNOSIS — Z03.89 ENCOUNTER FOR OBSERVATION FOR OTHER SUSPECTED DISEASES AND CONDITIONS RULED OUT: ICD-10-CM

## 2019-02-03 NOTE — ED ADULT NURSE REASSESSMENT NOTE - NS ED NURSE REASSESS COMMENT FT1
Patient left without providing information stating he is going to another hospital because he gave all the information to the triage nurse, and why does he have to give it again, explained to patient that to be treated we need in-depth info about complaint and medical hx, patient refused to stay, walked out, patient alert and oriented.

## 2019-03-05 ENCOUNTER — INPATIENT (INPATIENT)
Facility: HOSPITAL | Age: 54
LOS: 0 days | Discharge: LEFT BEFORE DISCHARGE | DRG: 313 | End: 2019-03-05
Payer: MEDICAID

## 2019-03-05 VITALS
OXYGEN SATURATION: 98 % | RESPIRATION RATE: 16 BRPM | HEART RATE: 100 BPM | TEMPERATURE: 99 F | DIASTOLIC BLOOD PRESSURE: 80 MMHG | SYSTOLIC BLOOD PRESSURE: 142 MMHG

## 2019-03-05 VITALS
DIASTOLIC BLOOD PRESSURE: 78 MMHG | HEART RATE: 98 BPM | TEMPERATURE: 98 F | WEIGHT: 184.97 LBS | OXYGEN SATURATION: 99 % | RESPIRATION RATE: 16 BRPM | SYSTOLIC BLOOD PRESSURE: 124 MMHG

## 2019-03-05 DIAGNOSIS — Z29.9 ENCOUNTER FOR PROPHYLACTIC MEASURES, UNSPECIFIED: ICD-10-CM

## 2019-03-05 DIAGNOSIS — Z91.89 OTHER SPECIFIED PERSONAL RISK FACTORS, NOT ELSEWHERE CLASSIFIED: ICD-10-CM

## 2019-03-05 DIAGNOSIS — F10.20 ALCOHOL DEPENDENCE, UNCOMPLICATED: ICD-10-CM

## 2019-03-05 DIAGNOSIS — R07.9 CHEST PAIN, UNSPECIFIED: ICD-10-CM

## 2019-03-05 DIAGNOSIS — R91.8 OTHER NONSPECIFIC ABNORMAL FINDING OF LUNG FIELD: ICD-10-CM

## 2019-03-05 DIAGNOSIS — R63.8 OTHER SYMPTOMS AND SIGNS CONCERNING FOOD AND FLUID INTAKE: ICD-10-CM

## 2019-03-05 DIAGNOSIS — F10.239 ALCOHOL DEPENDENCE WITH WITHDRAWAL, UNSPECIFIED: ICD-10-CM

## 2019-03-05 LAB
ALBUMIN SERPL ELPH-MCNC: 4 G/DL — SIGNIFICANT CHANGE UP (ref 3.3–5)
ALP SERPL-CCNC: 116 U/L — SIGNIFICANT CHANGE UP (ref 40–120)
ALT FLD-CCNC: 60 U/L — HIGH (ref 10–45)
ANION GAP SERPL CALC-SCNC: 18 MMOL/L — HIGH (ref 5–17)
ANION GAP SERPL CALC-SCNC: 20 MMOL/L — HIGH (ref 5–17)
APPEARANCE UR: CLEAR — SIGNIFICANT CHANGE UP
AST SERPL-CCNC: 92 U/L — HIGH (ref 10–40)
BASOPHILS # BLD AUTO: 0.04 K/UL — SIGNIFICANT CHANGE UP (ref 0–0.2)
BASOPHILS NFR BLD AUTO: 0.4 % — SIGNIFICANT CHANGE UP (ref 0–2)
BILIRUB SERPL-MCNC: 0.5 MG/DL — SIGNIFICANT CHANGE UP (ref 0.2–1.2)
BILIRUB UR-MCNC: ABNORMAL
BUN SERPL-MCNC: 10 MG/DL — SIGNIFICANT CHANGE UP (ref 7–23)
BUN SERPL-MCNC: 10 MG/DL — SIGNIFICANT CHANGE UP (ref 7–23)
CALCIUM SERPL-MCNC: 9.1 MG/DL — SIGNIFICANT CHANGE UP (ref 8.4–10.5)
CALCIUM SERPL-MCNC: 9.5 MG/DL — SIGNIFICANT CHANGE UP (ref 8.4–10.5)
CHLORIDE SERPL-SCNC: 92 MMOL/L — LOW (ref 96–108)
CHLORIDE SERPL-SCNC: 94 MMOL/L — LOW (ref 96–108)
CK SERPL-CCNC: 1017 U/L — HIGH (ref 30–200)
CK SERPL-CCNC: 1265 U/L — HIGH (ref 30–200)
CO2 SERPL-SCNC: 22 MMOL/L — SIGNIFICANT CHANGE UP (ref 22–31)
CO2 SERPL-SCNC: 24 MMOL/L — SIGNIFICANT CHANGE UP (ref 22–31)
COLOR SPEC: YELLOW — SIGNIFICANT CHANGE UP
CREAT SERPL-MCNC: 0.61 MG/DL — SIGNIFICANT CHANGE UP (ref 0.5–1.3)
CREAT SERPL-MCNC: 0.62 MG/DL — SIGNIFICANT CHANGE UP (ref 0.5–1.3)
DIFF PNL FLD: ABNORMAL
EOSINOPHIL # BLD AUTO: 0.01 K/UL — SIGNIFICANT CHANGE UP (ref 0–0.5)
EOSINOPHIL NFR BLD AUTO: 0.1 % — SIGNIFICANT CHANGE UP (ref 0–6)
GLUCOSE SERPL-MCNC: 85 MG/DL — SIGNIFICANT CHANGE UP (ref 70–99)
GLUCOSE SERPL-MCNC: 94 MG/DL — SIGNIFICANT CHANGE UP (ref 70–99)
GLUCOSE UR QL: NEGATIVE — SIGNIFICANT CHANGE UP
HCT VFR BLD CALC: 37.6 % — LOW (ref 39–50)
HGB BLD-MCNC: 13.1 G/DL — SIGNIFICANT CHANGE UP (ref 13–17)
IMM GRANULOCYTES NFR BLD AUTO: 0.6 % — SIGNIFICANT CHANGE UP (ref 0–1.5)
KETONES UR-MCNC: 15 MG/DL
LEUKOCYTE ESTERASE UR-ACNC: NEGATIVE — SIGNIFICANT CHANGE UP
LYMPHOCYTES # BLD AUTO: 0.7 K/UL — LOW (ref 1–3.3)
LYMPHOCYTES # BLD AUTO: 7.4 % — LOW (ref 13–44)
MAGNESIUM SERPL-MCNC: 2.2 MG/DL — SIGNIFICANT CHANGE UP (ref 1.6–2.6)
MCHC RBC-ENTMCNC: 33.9 PG — SIGNIFICANT CHANGE UP (ref 27–34)
MCHC RBC-ENTMCNC: 34.8 GM/DL — SIGNIFICANT CHANGE UP (ref 32–36)
MCV RBC AUTO: 97.4 FL — SIGNIFICANT CHANGE UP (ref 80–100)
MONOCYTES # BLD AUTO: 0.79 K/UL — SIGNIFICANT CHANGE UP (ref 0–0.9)
MONOCYTES NFR BLD AUTO: 8.3 % — SIGNIFICANT CHANGE UP (ref 2–14)
NEUTROPHILS # BLD AUTO: 7.87 K/UL — HIGH (ref 1.8–7.4)
NEUTROPHILS NFR BLD AUTO: 83.2 % — HIGH (ref 43–77)
NITRITE UR-MCNC: NEGATIVE — SIGNIFICANT CHANGE UP
NRBC # BLD: 0 /100 WBCS — SIGNIFICANT CHANGE UP (ref 0–0)
PH UR: 6.5 — SIGNIFICANT CHANGE UP (ref 5–8)
PHOSPHATE SERPL-MCNC: 3.6 MG/DL — SIGNIFICANT CHANGE UP (ref 2.5–4.5)
PLATELET # BLD AUTO: 200 K/UL — SIGNIFICANT CHANGE UP (ref 150–400)
POTASSIUM SERPL-MCNC: 3.5 MMOL/L — SIGNIFICANT CHANGE UP (ref 3.5–5.3)
POTASSIUM SERPL-MCNC: 3.9 MMOL/L — SIGNIFICANT CHANGE UP (ref 3.5–5.3)
POTASSIUM SERPL-SCNC: 3.5 MMOL/L — SIGNIFICANT CHANGE UP (ref 3.5–5.3)
POTASSIUM SERPL-SCNC: 3.9 MMOL/L — SIGNIFICANT CHANGE UP (ref 3.5–5.3)
PROT SERPL-MCNC: 8.7 G/DL — HIGH (ref 6–8.3)
PROT UR-MCNC: 100 MG/DL
RBC # BLD: 3.86 M/UL — LOW (ref 4.2–5.8)
RBC # FLD: 16.2 % — HIGH (ref 10.3–14.5)
SODIUM SERPL-SCNC: 134 MMOL/L — LOW (ref 135–145)
SODIUM SERPL-SCNC: 136 MMOL/L — SIGNIFICANT CHANGE UP (ref 135–145)
SP GR SPEC: 1.02 — SIGNIFICANT CHANGE UP (ref 1–1.03)
TROPONIN T SERPL-MCNC: <0.01 NG/ML — SIGNIFICANT CHANGE UP (ref 0–0.01)
UROBILINOGEN FLD QL: 1 E.U./DL — SIGNIFICANT CHANGE UP
WBC # BLD: 9.47 K/UL — SIGNIFICANT CHANGE UP (ref 3.8–10.5)
WBC # FLD AUTO: 9.47 K/UL — SIGNIFICANT CHANGE UP (ref 3.8–10.5)

## 2019-03-05 PROCEDURE — 71250 CT THORAX DX C-: CPT | Mod: 26

## 2019-03-05 PROCEDURE — 84100 ASSAY OF PHOSPHORUS: CPT

## 2019-03-05 PROCEDURE — 80053 COMPREHEN METABOLIC PANEL: CPT

## 2019-03-05 PROCEDURE — 99285 EMERGENCY DEPT VISIT HI MDM: CPT | Mod: 25

## 2019-03-05 PROCEDURE — 81001 URINALYSIS AUTO W/SCOPE: CPT

## 2019-03-05 PROCEDURE — 83735 ASSAY OF MAGNESIUM: CPT

## 2019-03-05 PROCEDURE — 36415 COLL VENOUS BLD VENIPUNCTURE: CPT

## 2019-03-05 PROCEDURE — 99285 EMERGENCY DEPT VISIT HI MDM: CPT

## 2019-03-05 PROCEDURE — 82550 ASSAY OF CK (CPK): CPT

## 2019-03-05 PROCEDURE — 80048 BASIC METABOLIC PNL TOTAL CA: CPT

## 2019-03-05 PROCEDURE — 85025 COMPLETE CBC W/AUTO DIFF WBC: CPT

## 2019-03-05 PROCEDURE — 84484 ASSAY OF TROPONIN QUANT: CPT

## 2019-03-05 PROCEDURE — 71250 CT THORAX DX C-: CPT

## 2019-03-05 PROCEDURE — 99223 1ST HOSP IP/OBS HIGH 75: CPT | Mod: GC

## 2019-03-05 PROCEDURE — 80307 DRUG TEST PRSMV CHEM ANLYZR: CPT

## 2019-03-05 RX ORDER — FOLIC ACID 0.8 MG
1 TABLET ORAL DAILY
Qty: 0 | Refills: 0 | Status: DISCONTINUED | OUTPATIENT
Start: 2019-03-05 | End: 2019-03-05

## 2019-03-05 RX ORDER — POTASSIUM CHLORIDE 20 MEQ
40 PACKET (EA) ORAL EVERY 4 HOURS
Qty: 0 | Refills: 0 | Status: COMPLETED | OUTPATIENT
Start: 2019-03-05 | End: 2019-03-05

## 2019-03-05 RX ORDER — SODIUM CHLORIDE 9 MG/ML
1000 INJECTION, SOLUTION INTRAVENOUS
Qty: 0 | Refills: 0 | Status: DISCONTINUED | OUTPATIENT
Start: 2019-03-05 | End: 2019-03-05

## 2019-03-05 RX ORDER — THIAMINE MONONITRATE (VIT B1) 100 MG
100 TABLET ORAL DAILY
Qty: 0 | Refills: 0 | Status: DISCONTINUED | OUTPATIENT
Start: 2019-03-05 | End: 2019-03-05

## 2019-03-05 RX ORDER — HEPARIN SODIUM 5000 [USP'U]/ML
5000 INJECTION INTRAVENOUS; SUBCUTANEOUS EVERY 8 HOURS
Qty: 0 | Refills: 0 | Status: DISCONTINUED | OUTPATIENT
Start: 2019-03-05 | End: 2019-03-05

## 2019-03-05 RX ORDER — THIAMINE MONONITRATE (VIT B1) 100 MG
100 TABLET ORAL DAILY
Qty: 0 | Refills: 0 | Status: DISCONTINUED | OUTPATIENT
Start: 2019-03-06 | End: 2019-03-05

## 2019-03-05 RX ORDER — FOLIC ACID 0.8 MG
1 TABLET ORAL DAILY
Qty: 0 | Refills: 0 | Status: DISCONTINUED | OUTPATIENT
Start: 2019-03-06 | End: 2019-03-05

## 2019-03-05 RX ADMIN — Medication 50 MILLIGRAM(S): at 21:08

## 2019-03-05 RX ADMIN — Medication 40 MILLIEQUIVALENT(S): at 17:38

## 2019-03-05 RX ADMIN — HEPARIN SODIUM 5000 UNIT(S): 5000 INJECTION INTRAVENOUS; SUBCUTANEOUS at 21:08

## 2019-03-05 RX ADMIN — SODIUM CHLORIDE 100 MILLILITER(S): 9 INJECTION, SOLUTION INTRAVENOUS at 11:14

## 2019-03-05 RX ADMIN — Medication 2 MILLIGRAM(S): at 09:30

## 2019-03-05 RX ADMIN — Medication 50 MILLIGRAM(S): at 09:30

## 2019-03-05 RX ADMIN — Medication 1 MILLIGRAM(S): at 21:08

## 2019-03-05 RX ADMIN — Medication 1 MILLIGRAM(S): at 18:40

## 2019-03-05 RX ADMIN — Medication 40 MILLIEQUIVALENT(S): at 21:08

## 2019-03-05 RX ADMIN — Medication 1 MILLIGRAM(S): at 16:05

## 2019-03-05 NOTE — H&P ADULT - ASSESSMENT
Pt is a 52yo M with a PMH of ETOH abuse, presenting with chest pain, found to have a 3cm VISHNU spiculated mass, admitted for further workup. Pt is a 52yo M with a PMH of ETOH abuse, presenting with chest pain, found to be in alcohol withdrawal and admitted for further management.

## 2019-03-05 NOTE — ED PROVIDER NOTE - CARE PLAN
Principal Discharge DX:	Alcohol intoxication Principal Discharge DX:	Alcohol intoxication  Secondary Diagnosis:	Lung mass  Secondary Diagnosis:	Chest pain

## 2019-03-05 NOTE — ED PROVIDER NOTE - PROGRESS NOTE DETAILS
Director - pt received from night team; pt in ct scan.  Pt pending ct and 2 troponins.  VSS, labs reviewed.

## 2019-03-05 NOTE — H&P ADULT - HISTORY OF PRESENT ILLNESS
Pt is a 54yo M with a PMH of ETOH abuse, presenting with chest pain. Pt has had chest pain since this morning....    Vitals in the ED:  T(F): 98.4  HR: 98 - 99  BP: 124/78 - 130/71  RR: 16   SpO2: 96% - 99% on room air    Labs notable for mild hyponatremia and hypochloremia with an anion gap of 20, and mild transaminitis of AST/ALT 92/60.  CT chest significant for 3cm spiculated nodule in VISHNU with smaller nodular densities, emphysematous changes, and severe fatty liver. Pt is a 52yo M with a PMH of ETOH abuse, presenting with chest pain. Pt has had chest pain since this morning, in the center of his chest, non-radiating, sharp in nature, and constant. Pt has never had similar pain before. Did not take any meds for the pain before coming in. Of note, pt drinks daily (reports 3-4 beers per day), and had his last drink at noon yesterday (2 beers). Last experienced withdrawal 6 months ago. Denies any history of seizures, DT, or intubation for withdrawal.     Vitals in the ED:  T(F): 98.4  HR: 98 - 99  BP: 124/78 - 130/71  RR: 16   SpO2: 96% - 99% on room air    Labs notable for mild hyponatremia and hypochloremia with an anion gap of 20, and mild transaminitis of AST/ALT 92/60.  CT chest significant for 3cm spiculated nodule in VISHNU with smaller nodular densities, emphysematous changes, and severe fatty liver.

## 2019-03-05 NOTE — H&P ADULT - PROBLEM SELECTOR PLAN 7
Transition of Care   1) PCP Contacted on Admission: (Y/N) --> Name & Phone #: None. Will need LHM appt on discharge.   2) Date of Contact with PCP:  3) PCP Contacted at Discharge: (Y/N)  4) Summary of Handoff Given to PCP:   5) Post-Discharge Appointment Date and Location:

## 2019-03-05 NOTE — H&P ADULT - NSHPLABSRESULTS_GEN_ALL_CORE
13.1   9.47  )-----------( 200      ( 05 Mar 2019 06:45 )             37.6     03-05    134<L>  |  92<L>  |  10  ----------------------------<  94  3.5   |  22  |  0.62    Ca    9.1      05 Mar 2019 06:45    TPro  8.7<H>  /  Alb  4.0  /  TBili  0.5  /  DBili  x   /  AST  92<H>  /  ALT  60<H>  /  AlkPhos  116  03-05        CARDIAC MARKERS ( 05 Mar 2019 06:45 )  x     / <0.01 ng/mL / 1265 U/L / x     / x                RADIOLOGY, EKG & ADDITIONAL TESTS: Reviewed.

## 2019-03-05 NOTE — H&P ADULT - PROBLEM SELECTOR PLAN 4
pt with a hx of alcohol abuse, and is homeless living in shelters with no PCP followup  -counseling on substance use  - consult

## 2019-03-05 NOTE — ED ADULT NURSE REASSESSMENT NOTE - NS ED NURSE REASSESS COMMENT FT1
Patient not found at this time for vital signs pending transport , clothes and shoes missing, may  have left with IV access intact.  VERONICA Celis made aware.  Provider being contacted at this time.

## 2019-03-05 NOTE — ED PROVIDER NOTE - OBJECTIVE STATEMENT
53M with h/o ETOH abuse who p/w etoh intox.. Contrary to triage note he denies CP - he states it's cold outside and he wants to sleep for a while in the ED. 53M with h/o ETOH abuse who p/w etoh intox and CP/SOb since earlier this morning. Chest pain is sharp and substernal. He reports multiple episodes of similar pain. Cannot recall if he is had a stress test.

## 2019-03-05 NOTE — H&P ADULT - NSHPPHYSICALEXAM_GEN_ALL_CORE
Constitutional: WDWN , in acute distress tremulous, anxious, sweating, nauseous. Is restless and agitated, uninterested in answering questions.   Head: NC/AT  Eyes: clear conjunctiva  ENT: no nasal discharge; MMM  Respiratory: CTA B/L; no W/R/R, no retractions  Cardiac: +S1/S2; RRR; no M/R/G  Gastrointestinal: soft, ND; diffuse tenderness, no rebound or guarding; hypoactive BS  Extremities: WWP, no clubbing or cyanosis; no peripheral edema  Dermatologic: skin warm, dry and intact; no rashes, wounds, or scars  Neurologic: alert, awake, responsive, can follow commands. Denies auditory/tactile/visual hallucations. CIWA approx 13

## 2019-03-05 NOTE — ED PROVIDER NOTE - PHYSICAL EXAMINATION
GEN: WD, well nourished, awake, alert, oriented to person, place, time/situation and in no apparent distress. Intoxicated  ENT: Airway patent, Nasal mucosa clear. Mouth with normal mucosa. No evidence of trauma  EYES: Clear bilaterally.  RESPIRATORY: Breathing comfortably with normal RR. No resp distress  CARDIAC: Regular rate and rhythm  ABDOMEN: Soft, nontender,   MSK: Range of motion is not limited, no deformities noted.  NEURO: Alert and oriented x 3. Cn 2-12 intact. Strength 5/5 and sensation intact in all 4 extremities.  SKIN: Skin normal color for race, warm, dry and intact. No evidence of rash.

## 2019-03-05 NOTE — H&P ADULT - PROBLEM SELECTOR PLAN 1
Pt found to be in alcohol withdrawal on examination, CIWA 13 (nausea, tremors, sweats, agitation). Never had DT or been intubated. Last withdrawal 6 month ago. Drinks 3-4 beers per day, last drink yesterday at noon (2 beers). Denies other drug use.  -low threshold for ICU consult   -will give banana bag  -librium 50 q12  -ativan PRN > 8  -will start folic acid, MV, thiamine  -fu UA and Utox    #elevated anion gap  Pt with AG of 20 on admission, likely 2/2 alcoholic ketosis  -fu UA  -repeat BMP

## 2019-03-05 NOTE — H&P ADULT - PROBLEM SELECTOR PLAN 3
CT chest significant for 3cm spiculated nodule in VISHNU with smaller nodular densities. Possibly contributing to CP. Will r/o malignancy  -consider f/u MRI and biopsy

## 2019-03-05 NOTE — H&P ADULT - ATTENDING COMMENTS
I was present with resident during the history and exam. I discussed the case with the resident and agree with the findings and plan as documented in Dr. Khanna's note.     Briefly, this is a 52yo undomiciled gentleman, former smoker, with a PMH of EtOH abuse (no prior DT's) who p/w acute onset retrosternal, non-radiating chest pain x several hours.  At the time of my interview, he had just received Librium; thus, history limited.  He does report his chest pain has improved since arriving to the ED.  With regards to prior withdrawals, he usually experiences diaphoresis and anxiety.    PMH/PSH/Medications/FH/SH reviewed by me and discussed with resident team.    Exam  Gen: NAD, lying comfortably in bed but diaphoretic  HEENT: NCAT, MMM, clear OP  Neck: supple, trachea at midline  CV: tachy but regular, no m/g/r appreciated  Pulm: CTA anteriorly, unable to position to listen posteriorly  Abd: normoactive BS, soft, NTND  Ext: WWP, 2+ DPs and RPs; no c/e/e  Skin: no jaundice  Neuro: AOx3, CN II-XII grossly intact; nonfocal  Psych: CIWA 4-5    Labs/Radiology/EKG reviewed by me and discussed with resident team.    Assessment/Plan:   In summary, this is a 52yo undomiciled gentleman, former smoker, with a PMH of EtOH abuse (no prior DT's) who p/w acute onset retrosternal, non-radiating chest pain x several hours and subsequently found to be in EtOH withdrawal.  Given his initial appearance and acute needs.  He is unlikely presenting with ACS given CP resolving, troponin negative x2 and EKG non-ischemic.  For now, will address his active withdrawal and defer discussion of incidentally discovered VISHNU spiculated nodule once patient is able to participate with discussion.  This could potentially be deferred to an outpatient work-up if he can reliably follow-up, which is unclear at this time.    -- CIWA protocol w/low threshold to escalate care  -- c/w standing IVFs (Na131  -- f/u UA for ketones given AG on BMP  -- f/u repeat labs at noon  -- pending discussions, may need inpatient MRI (liver lesion) and lung biopsy  -- [place], RE: jeramie Collado  [phone]

## 2019-03-05 NOTE — H&P ADULT - PROBLEM SELECTOR PLAN 2
Pt with sharp central chest pain, persistent. Likely related to alcohol withdrawal, however will r/o ACS. Trops negative x1, however CK elevated. EKG sinus tachycardia without ischemic changes  -repeat CK  -repeat EKG

## 2019-03-05 NOTE — ED ADULT NURSE NOTE - OBJECTIVE STATEMENT
54 y/o male c/o chest pain. pt reports CP today started suddenly. Pt reports alcohol today. Pt has AOB. Pt extremely sleepy. pt vague w/ responses to assessment questions. Pt speaks clear, MAEx4, ambulates steady, unlabored breathing. Abd soft ntnd. SKin dry warm.

## 2019-03-05 NOTE — ED ADULT NURSE REASSESSMENT NOTE - NS ED NURSE REASSESS COMMENT FT1
Patient came in for ETOH, EKG NSR, vital signs stable.  Patient asleep, responds to voice, ATivan 1 mg IVP administered, no tremors, no agitation, no nausea/vomititng. Patient for 4 Uris admit for monitoring and observation.

## 2019-03-05 NOTE — ED PROVIDER NOTE - CLINICAL SUMMARY MEDICAL DECISION MAKING FREE TEXT BOX
53M with etoh intox, he denies CP in the ER, EKG no ischemia. Plan to observe until sober. 53M with etoh intox and CP. EKG no iscehmia, Low suspicion for ACS. Will check labs/trop and observe until clinically sober for reassessment. 53M with etoh intox and CP. EKG no ischemia, Low suspicion for ACS. Will check labs/trop and observe until clinically sober for reassessment.   Previous imaging reviewed and left hilar mass on xr in aug noted. Will get CT for further eval.

## 2019-03-05 NOTE — ED ADULT NURSE NOTE - NSIMPLEMENTINTERV_GEN_ALL_ED
Implemented All Universal Safety Interventions:  [place] to call system. Call bell, personal items and telephone within reach. Instruct patient to call for assistance. Room bathroom lighting operational. Non-slip footwear when patient is off stretcher. Physically safe environment: no spills, clutter or unnecessary equipment. Stretcher in lowest position, wheels locked, appropriate side rails in place.

## 2019-03-06 NOTE — DISCHARGE NOTE PROVIDER - HOSPITAL COURSE
Patient eloped. 53M with a PMH of ETOH abuse, presenting with chest pain. Pt has had chest pain since this morning, in the center of his chest, non-radiating, sharp in nature, and constant. Pt has never had similar pain before. Did not take any meds for the pain before coming in. Of note, pt drinks daily (reports 3-4 beers per day), and had his last drink at noon yesterday (2 beers). Last experienced withdrawal 6 months ago. Denies any history of seizures, DT, or intubation for withdrawal. Labs notable for mild hyponatremia and hypochloremia with an anion gap of 20, and mild transaminitis of AST/ALT 92/60.CT chest significant for 3cm spiculated nodule in VISHNU with smaller nodular densities, emphysematous changes, and severe fatty liver. Patient started on librium 50q12 hr and ativan 1mg prn CIWA >8. He received last dose of librium and ativan at 9p on 3/5/19 prior to eloping.

## 2019-03-06 NOTE — ED ADULT NURSE REASSESSMENT NOTE - NS ED NURSE REASSESS COMMENT FT1
Nursing Supervisor aware patient eloped and may have left with Right AC IV access in place, unable to find patient shoes, clothes or IV heplock in bed when not found to do vital signs at 12midnight earlier.  Attempts made to contact numbers left by patient on information:  [phone] and [phone] - INVALID phone brianna GARDNER [facility] precint called and spoke w/ JOSE CRUZ Ma to give information about patient leaving with IV access;  told will sent some police officers to [facility] to ge patient information.

## 2019-03-06 NOTE — ED ADULT NURSE REASSESSMENT NOTE - NS ED NURSE REASSESS COMMENT FT1
At about 2pm [facility] [place] and Officer Sandoval came to ED to get patient information.  Officers clarified w/ RN that patient not a danger to self or to others and will be on look out for patient. At about 2 am [facility] [place] and Officer Sandoval came to ED to get patient information.  Officers clarified w/ RN that patient not a danger to self or to others and will be on look out for patient.

## 2019-03-06 NOTE — DISCHARGE NOTE PROVIDER - NSDCCPCAREPLAN_GEN_ALL_CORE_FT
PRINCIPAL DISCHARGE DIAGNOSIS  Problem: Alcohol intoxication  Assessment and Plan of Treatment: Came to hospital for EtOH intoxication. Treated with medications to prevent withdrawal and was found to have eloped.      SECONDARY DISCHARGE DIAGNOSES  Problem: Lung mass  Assessment and Plan of Treatment: Found on CT. Patient notified.    Problem: Chest pain  Assessment and Plan of Treatment: patient had chest pain that resolved
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
## 2019-03-11 DIAGNOSIS — Z59.0 HOMELESSNESS: ICD-10-CM

## 2019-03-11 DIAGNOSIS — R74.0 NONSPECIFIC ELEVATION OF LEVELS OF TRANSAMINASE AND LACTIC ACID DEHYDROGENASE [LDH]: ICD-10-CM

## 2019-03-11 DIAGNOSIS — E87.8 OTHER DISORDERS OF ELECTROLYTE AND FLUID BALANCE, NOT ELSEWHERE CLASSIFIED: ICD-10-CM

## 2019-03-11 DIAGNOSIS — F10.220 ALCOHOL DEPENDENCE WITH INTOXICATION, UNCOMPLICATED: ICD-10-CM

## 2019-03-11 DIAGNOSIS — R91.8 OTHER NONSPECIFIC ABNORMAL FINDING OF LUNG FIELD: ICD-10-CM

## 2019-03-11 DIAGNOSIS — R07.9 CHEST PAIN, UNSPECIFIED: ICD-10-CM

## 2019-03-11 DIAGNOSIS — K76.0 FATTY (CHANGE OF) LIVER, NOT ELSEWHERE CLASSIFIED: ICD-10-CM

## 2019-03-11 SDOH — ECONOMIC STABILITY - HOUSING INSECURITY: HOMELESSNESS: Z59.0

## 2019-11-11 NOTE — PATIENT PROFILE ADULT. - DOES PATIENT HAVE ADVANCE DIRECTIVE
Intubation  Performed by: Ghulam Burgos CRNA  Authorized by: Marycarmen Douglas 
No

## 2019-11-13 NOTE — ED PROVIDER NOTE - ENMT, MLM
Acute pancreatitis without infection or necrosis, unspecified pancreatitis type Airway patent, Nasal mucosa clear. Mouth with normal mucosa.

## 2020-01-14 NOTE — H&P ADULT - NEUROLOGICAL
OK to refill per protocol.  Last office visit - 04/25/19  Next scheduled appointment-none; called patient and left message informing she is overdue for 6 month follow-up. Patient to call back to schedule.   Last Lab Result-CMP=04/25/19    /82        Alert & oriented; no sensory, motor or coordination deficits, normal reflexes

## 2020-07-06 ENCOUNTER — INPATIENT (INPATIENT)
Facility: HOSPITAL | Age: 55
LOS: 0 days | Discharge: LEFT BEFORE DISCHARGE | DRG: 894 | End: 2020-07-07
Attending: INTERNAL MEDICINE | Admitting: INTERNAL MEDICINE
Payer: MEDICAID

## 2020-07-06 VITALS
WEIGHT: 169.98 LBS | DIASTOLIC BLOOD PRESSURE: 85 MMHG | SYSTOLIC BLOOD PRESSURE: 119 MMHG | OXYGEN SATURATION: 98 % | TEMPERATURE: 98 F | HEIGHT: 72 IN | RESPIRATION RATE: 18 BRPM | HEART RATE: 114 BPM

## 2020-07-06 DIAGNOSIS — R07.9 CHEST PAIN, UNSPECIFIED: ICD-10-CM

## 2020-07-06 DIAGNOSIS — F10.239 ALCOHOL DEPENDENCE WITH WITHDRAWAL, UNSPECIFIED: ICD-10-CM

## 2020-07-06 DIAGNOSIS — I48.91 UNSPECIFIED ATRIAL FIBRILLATION: ICD-10-CM

## 2020-07-06 DIAGNOSIS — E87.2 ACIDOSIS: ICD-10-CM

## 2020-07-06 DIAGNOSIS — C34.90 MALIGNANT NEOPLASM OF UNSPECIFIED PART OF UNSPECIFIED BRONCHUS OR LUNG: ICD-10-CM

## 2020-07-06 DIAGNOSIS — R31.9 HEMATURIA, UNSPECIFIED: ICD-10-CM

## 2020-07-06 DIAGNOSIS — R63.8 OTHER SYMPTOMS AND SIGNS CONCERNING FOOD AND FLUID INTAKE: ICD-10-CM

## 2020-07-06 LAB
ALBUMIN SERPL ELPH-MCNC: 3.8 G/DL — SIGNIFICANT CHANGE UP (ref 3.3–5)
ALBUMIN SERPL ELPH-MCNC: 4.2 G/DL — SIGNIFICANT CHANGE UP (ref 3.3–5)
ALP SERPL-CCNC: 103 U/L — SIGNIFICANT CHANGE UP (ref 40–120)
ALP SERPL-CCNC: 113 U/L — SIGNIFICANT CHANGE UP (ref 40–120)
ALT FLD-CCNC: 24 U/L — SIGNIFICANT CHANGE UP (ref 10–45)
ALT FLD-CCNC: SIGNIFICANT CHANGE UP U/L (ref 10–45)
AMPHET UR-MCNC: NEGATIVE — SIGNIFICANT CHANGE UP
ANION GAP SERPL CALC-SCNC: 18 MMOL/L — HIGH (ref 5–17)
ANION GAP SERPL CALC-SCNC: 24 MMOL/L — HIGH (ref 5–17)
APPEARANCE UR: CLEAR — SIGNIFICANT CHANGE UP
APTT BLD: 29 SEC — SIGNIFICANT CHANGE UP (ref 27.5–35.5)
AST SERPL-CCNC: 56 U/L — HIGH (ref 10–40)
AST SERPL-CCNC: SIGNIFICANT CHANGE UP U/L (ref 10–40)
BACTERIA # UR AUTO: PRESENT /HPF
BARBITURATES UR SCN-MCNC: NEGATIVE — SIGNIFICANT CHANGE UP
BASOPHILS # BLD AUTO: 0.02 K/UL — SIGNIFICANT CHANGE UP (ref 0–0.2)
BASOPHILS NFR BLD AUTO: 0.4 % — SIGNIFICANT CHANGE UP (ref 0–2)
BENZODIAZ UR-MCNC: NEGATIVE — SIGNIFICANT CHANGE UP
BILIRUB SERPL-MCNC: 0.8 MG/DL — SIGNIFICANT CHANGE UP (ref 0.2–1.2)
BILIRUB SERPL-MCNC: 0.8 MG/DL — SIGNIFICANT CHANGE UP (ref 0.2–1.2)
BILIRUB UR-MCNC: NEGATIVE — SIGNIFICANT CHANGE UP
BUN SERPL-MCNC: 8 MG/DL — SIGNIFICANT CHANGE UP (ref 7–23)
BUN SERPL-MCNC: 8 MG/DL — SIGNIFICANT CHANGE UP (ref 7–23)
CALCIUM SERPL-MCNC: 8.3 MG/DL — LOW (ref 8.4–10.5)
CALCIUM SERPL-MCNC: 9.4 MG/DL — SIGNIFICANT CHANGE UP (ref 8.4–10.5)
CHLORIDE SERPL-SCNC: 92 MMOL/L — LOW (ref 96–108)
CHLORIDE SERPL-SCNC: 96 MMOL/L — SIGNIFICANT CHANGE UP (ref 96–108)
CK MB CFR SERPL CALC: 12.2 NG/ML — HIGH (ref 0–6.7)
CK SERPL-CCNC: 950 U/L — HIGH (ref 30–200)
CO2 SERPL-SCNC: 19 MMOL/L — LOW (ref 22–31)
CO2 SERPL-SCNC: 21 MMOL/L — LOW (ref 22–31)
COCAINE METAB.OTHER UR-MCNC: NEGATIVE — SIGNIFICANT CHANGE UP
COLOR SPEC: YELLOW — SIGNIFICANT CHANGE UP
COMMENT - URINE: SIGNIFICANT CHANGE UP
CREAT SERPL-MCNC: 0.67 MG/DL — SIGNIFICANT CHANGE UP (ref 0.5–1.3)
CREAT SERPL-MCNC: 0.76 MG/DL — SIGNIFICANT CHANGE UP (ref 0.5–1.3)
D DIMER BLD IA.RAPID-MCNC: 2382 NG/ML DDU — HIGH
DIFF PNL FLD: ABNORMAL
EOSINOPHIL # BLD AUTO: 0.02 K/UL — SIGNIFICANT CHANGE UP (ref 0–0.5)
EOSINOPHIL NFR BLD AUTO: 0.4 % — SIGNIFICANT CHANGE UP (ref 0–6)
EPI CELLS # UR: SIGNIFICANT CHANGE UP /HPF (ref 0–5)
ETHANOL SERPL-MCNC: 68 MG/DL — HIGH (ref 0–10)
GLUCOSE SERPL-MCNC: 80 MG/DL — SIGNIFICANT CHANGE UP (ref 70–99)
GLUCOSE SERPL-MCNC: 87 MG/DL — SIGNIFICANT CHANGE UP (ref 70–99)
GLUCOSE UR QL: NEGATIVE — SIGNIFICANT CHANGE UP
HCT VFR BLD CALC: 42.1 % — SIGNIFICANT CHANGE UP (ref 39–50)
HGB BLD-MCNC: 14.3 G/DL — SIGNIFICANT CHANGE UP (ref 13–17)
HYALINE CASTS # UR AUTO: ABNORMAL /LPF (ref 0–2)
IMM GRANULOCYTES NFR BLD AUTO: 0.4 % — SIGNIFICANT CHANGE UP (ref 0–1.5)
INR BLD: 0.89 — SIGNIFICANT CHANGE UP (ref 0.88–1.16)
KETONES UR-MCNC: ABNORMAL MG/DL
LACTATE SERPL-SCNC: 1.7 MMOL/L — SIGNIFICANT CHANGE UP (ref 0.5–2)
LEUKOCYTE ESTERASE UR-ACNC: NEGATIVE — SIGNIFICANT CHANGE UP
LIDOCAIN IGE QN: 387 U/L — HIGH (ref 7–60)
LYMPHOCYTES # BLD AUTO: 1.17 K/UL — SIGNIFICANT CHANGE UP (ref 1–3.3)
LYMPHOCYTES # BLD AUTO: 23 % — SIGNIFICANT CHANGE UP (ref 13–44)
MAGNESIUM SERPL-MCNC: 1.9 MG/DL — SIGNIFICANT CHANGE UP (ref 1.6–2.6)
MAGNESIUM SERPL-MCNC: 2.1 MG/DL — SIGNIFICANT CHANGE UP (ref 1.6–2.6)
MCHC RBC-ENTMCNC: 33.6 PG — SIGNIFICANT CHANGE UP (ref 27–34)
MCHC RBC-ENTMCNC: 34 GM/DL — SIGNIFICANT CHANGE UP (ref 32–36)
MCV RBC AUTO: 99.1 FL — SIGNIFICANT CHANGE UP (ref 80–100)
METHADONE UR-MCNC: NEGATIVE — SIGNIFICANT CHANGE UP
MONOCYTES # BLD AUTO: 0.39 K/UL — SIGNIFICANT CHANGE UP (ref 0–0.9)
MONOCYTES NFR BLD AUTO: 7.7 % — SIGNIFICANT CHANGE UP (ref 2–14)
NEUTROPHILS # BLD AUTO: 3.46 K/UL — SIGNIFICANT CHANGE UP (ref 1.8–7.4)
NEUTROPHILS NFR BLD AUTO: 68.1 % — SIGNIFICANT CHANGE UP (ref 43–77)
NITRITE UR-MCNC: NEGATIVE — SIGNIFICANT CHANGE UP
NRBC # BLD: 0 /100 WBCS — SIGNIFICANT CHANGE UP (ref 0–0)
NT-PROBNP SERPL-SCNC: 67 PG/ML — SIGNIFICANT CHANGE UP (ref 0–300)
OPIATES UR-MCNC: NEGATIVE — SIGNIFICANT CHANGE UP
PCP SPEC-MCNC: SIGNIFICANT CHANGE UP
PCP UR-MCNC: NEGATIVE — SIGNIFICANT CHANGE UP
PH UR: 6 — SIGNIFICANT CHANGE UP (ref 5–8)
PHOSPHATE SERPL-MCNC: 3.3 MG/DL — SIGNIFICANT CHANGE UP (ref 2.5–4.5)
PLATELET # BLD AUTO: 223 K/UL — SIGNIFICANT CHANGE UP (ref 150–400)
POTASSIUM SERPL-MCNC: 4.2 MMOL/L — SIGNIFICANT CHANGE UP (ref 3.5–5.3)
POTASSIUM SERPL-MCNC: SIGNIFICANT CHANGE UP MMOL/L (ref 3.5–5.3)
POTASSIUM SERPL-SCNC: 4.2 MMOL/L — SIGNIFICANT CHANGE UP (ref 3.5–5.3)
POTASSIUM SERPL-SCNC: SIGNIFICANT CHANGE UP MMOL/L (ref 3.5–5.3)
PROT SERPL-MCNC: 7.9 G/DL — SIGNIFICANT CHANGE UP (ref 6–8.3)
PROT SERPL-MCNC: 9.4 G/DL — HIGH (ref 6–8.3)
PROT UR-MCNC: 100 MG/DL
PROTHROM AB SERPL-ACNC: 10.7 SEC — SIGNIFICANT CHANGE UP (ref 10.6–13.6)
RBC # BLD: 4.25 M/UL — SIGNIFICANT CHANGE UP (ref 4.2–5.8)
RBC # FLD: 13.6 % — SIGNIFICANT CHANGE UP (ref 10.3–14.5)
RBC CASTS # UR COMP ASSIST: < 5 /HPF — SIGNIFICANT CHANGE UP
SALICYLATES SERPL-MCNC: <0.3 MG/DL — LOW (ref 2.8–20)
SODIUM SERPL-SCNC: 135 MMOL/L — SIGNIFICANT CHANGE UP (ref 135–145)
SODIUM SERPL-SCNC: 135 MMOL/L — SIGNIFICANT CHANGE UP (ref 135–145)
SP GR SPEC: 1.02 — SIGNIFICANT CHANGE UP (ref 1–1.03)
THC UR QL: NEGATIVE — SIGNIFICANT CHANGE UP
TROPONIN T SERPL-MCNC: <0.01 NG/ML — SIGNIFICANT CHANGE UP (ref 0–0.01)
TROPONIN T SERPL-MCNC: <0.01 NG/ML — SIGNIFICANT CHANGE UP (ref 0–0.01)
TSH SERPL-MCNC: 1.32 UIU/ML — SIGNIFICANT CHANGE UP (ref 0.35–4.94)
UROBILINOGEN FLD QL: 0.2 E.U./DL — SIGNIFICANT CHANGE UP
WBC # BLD: 5.08 K/UL — SIGNIFICANT CHANGE UP (ref 3.8–10.5)
WBC # FLD AUTO: 5.08 K/UL — SIGNIFICANT CHANGE UP (ref 3.8–10.5)
WBC UR QL: ABNORMAL /HPF

## 2020-07-06 PROCEDURE — 99223 1ST HOSP IP/OBS HIGH 75: CPT | Mod: GC

## 2020-07-06 PROCEDURE — 93010 ELECTROCARDIOGRAM REPORT: CPT

## 2020-07-06 PROCEDURE — 71275 CT ANGIOGRAPHY CHEST: CPT | Mod: 26

## 2020-07-06 PROCEDURE — 93010 ELECTROCARDIOGRAM REPORT: CPT | Mod: 77

## 2020-07-06 PROCEDURE — 71045 X-RAY EXAM CHEST 1 VIEW: CPT | Mod: 26

## 2020-07-06 PROCEDURE — 99291 CRITICAL CARE FIRST HOUR: CPT

## 2020-07-06 RX ORDER — METOPROLOL TARTRATE 50 MG
5 TABLET ORAL ONCE
Refills: 0 | Status: COMPLETED | OUTPATIENT
Start: 2020-07-06 | End: 2020-07-06

## 2020-07-06 RX ORDER — SODIUM CHLORIDE 9 MG/ML
500 INJECTION INTRAMUSCULAR; INTRAVENOUS; SUBCUTANEOUS ONCE
Refills: 0 | Status: COMPLETED | OUTPATIENT
Start: 2020-07-06 | End: 2020-07-06

## 2020-07-06 RX ORDER — DILTIAZEM HCL 120 MG
20 CAPSULE, EXT RELEASE 24 HR ORAL ONCE
Refills: 0 | Status: COMPLETED | OUTPATIENT
Start: 2020-07-06 | End: 2020-07-06

## 2020-07-06 RX ORDER — ONDANSETRON 8 MG/1
4 TABLET, FILM COATED ORAL ONCE
Refills: 0 | Status: COMPLETED | OUTPATIENT
Start: 2020-07-06 | End: 2020-07-06

## 2020-07-06 RX ORDER — MAGNESIUM SULFATE 500 MG/ML
2 VIAL (ML) INJECTION ONCE
Refills: 0 | Status: COMPLETED | OUTPATIENT
Start: 2020-07-06 | End: 2020-07-06

## 2020-07-06 RX ORDER — THIAMINE MONONITRATE (VIT B1) 100 MG
100 TABLET ORAL EVERY 24 HOURS
Refills: 0 | Status: DISCONTINUED | OUTPATIENT
Start: 2020-07-06 | End: 2020-07-07

## 2020-07-06 RX ORDER — SODIUM CHLORIDE 9 MG/ML
1000 INJECTION, SOLUTION INTRAVENOUS
Refills: 0 | Status: DISCONTINUED | OUTPATIENT
Start: 2020-07-06 | End: 2020-07-07

## 2020-07-06 RX ORDER — SODIUM CHLORIDE 9 MG/ML
1000 INJECTION INTRAMUSCULAR; INTRAVENOUS; SUBCUTANEOUS ONCE
Refills: 0 | Status: COMPLETED | OUTPATIENT
Start: 2020-07-06 | End: 2020-07-06

## 2020-07-06 RX ORDER — FOLIC ACID 0.8 MG
1 TABLET ORAL DAILY
Refills: 0 | Status: DISCONTINUED | OUTPATIENT
Start: 2020-07-06 | End: 2020-07-07

## 2020-07-06 RX ORDER — DILTIAZEM HCL 120 MG
20 CAPSULE, EXT RELEASE 24 HR ORAL ONCE
Refills: 0 | Status: DISCONTINUED | OUTPATIENT
Start: 2020-07-06 | End: 2020-07-06

## 2020-07-06 RX ORDER — DILTIAZEM HCL 120 MG
60 CAPSULE, EXT RELEASE 24 HR ORAL ONCE
Refills: 0 | Status: COMPLETED | OUTPATIENT
Start: 2020-07-06 | End: 2020-07-06

## 2020-07-06 RX ORDER — ENOXAPARIN SODIUM 100 MG/ML
40 INJECTION SUBCUTANEOUS EVERY 24 HOURS
Refills: 0 | Status: DISCONTINUED | OUTPATIENT
Start: 2020-07-06 | End: 2020-07-07

## 2020-07-06 RX ORDER — DIGOXIN 250 MCG
0.5 TABLET ORAL ONCE
Refills: 0 | Status: COMPLETED | OUTPATIENT
Start: 2020-07-06 | End: 2020-07-06

## 2020-07-06 RX ORDER — DIGOXIN 250 MCG
0.25 TABLET ORAL ONCE
Refills: 0 | Status: COMPLETED | OUTPATIENT
Start: 2020-07-06 | End: 2020-07-06

## 2020-07-06 RX ADMIN — SODIUM CHLORIDE 1000 MILLILITER(S): 9 INJECTION INTRAMUSCULAR; INTRAVENOUS; SUBCUTANEOUS at 09:53

## 2020-07-06 RX ADMIN — Medication 50 MILLIGRAM(S): at 14:37

## 2020-07-06 RX ADMIN — Medication 100 MILLIGRAM(S): at 14:37

## 2020-07-06 RX ADMIN — SODIUM CHLORIDE 150 MILLILITER(S): 9 INJECTION, SOLUTION INTRAVENOUS at 14:47

## 2020-07-06 RX ADMIN — Medication 0.25 MILLIGRAM(S): at 21:54

## 2020-07-06 RX ADMIN — Medication 1 MILLIGRAM(S): at 14:36

## 2020-07-06 RX ADMIN — Medication 0.5 MILLIGRAM(S): at 16:26

## 2020-07-06 RX ADMIN — SODIUM CHLORIDE 1000 MILLILITER(S): 9 INJECTION INTRAMUSCULAR; INTRAVENOUS; SUBCUTANEOUS at 08:15

## 2020-07-06 RX ADMIN — ONDANSETRON 4 MILLIGRAM(S): 8 TABLET, FILM COATED ORAL at 17:33

## 2020-07-06 RX ADMIN — Medication 20 MILLIGRAM(S): at 08:50

## 2020-07-06 RX ADMIN — SODIUM CHLORIDE 2000 MILLILITER(S): 9 INJECTION INTRAMUSCULAR; INTRAVENOUS; SUBCUTANEOUS at 10:09

## 2020-07-06 RX ADMIN — Medication 50 GRAM(S): at 21:55

## 2020-07-06 RX ADMIN — Medication 5 MILLIGRAM(S): at 12:48

## 2020-07-06 RX ADMIN — Medication 60 MILLIGRAM(S): at 09:52

## 2020-07-06 RX ADMIN — Medication 2 MILLIGRAM(S): at 08:15

## 2020-07-06 RX ADMIN — Medication 5 MILLIGRAM(S): at 13:14

## 2020-07-06 RX ADMIN — Medication 25 MILLIGRAM(S): at 08:59

## 2020-07-06 RX ADMIN — Medication 50 MILLIGRAM(S): at 21:55

## 2020-07-06 RX ADMIN — Medication 1 TABLET(S): at 14:37

## 2020-07-06 RX ADMIN — Medication 20 MILLIGRAM(S): at 09:52

## 2020-07-06 RX ADMIN — ENOXAPARIN SODIUM 40 MILLIGRAM(S): 100 INJECTION SUBCUTANEOUS at 21:55

## 2020-07-06 RX ADMIN — SODIUM CHLORIDE 1000 MILLILITER(S): 9 INJECTION INTRAMUSCULAR; INTRAVENOUS; SUBCUTANEOUS at 16:27

## 2020-07-06 RX ADMIN — Medication 5 MILLIGRAM(S): at 12:04

## 2020-07-06 NOTE — H&P ADULT - NSHPLABSRESULTS_GEN_ALL_CORE
LABS:                         14.3   5.08  )-----------( 223      ( 2020 07:59 )             42.1     07-06    135  |  96  |  8   ----------------------------<  80  4.2   |  21<L>  |  0.67    Ca    8.3<L>      2020 12:02  Phos  3.3     07-  Mg     1.9     07-    TPro  7.9  /  Alb  3.8  /  TBili  0.8  /  DBili  x   /  AST  56<H>  /  ALT  24  /  AlkPhos  103  07-06    PT/INR - ( 2020 07:59 )   PT: 10.7 sec;   INR: 0.89          PTT - ( 2020 07:59 )  PTT:29.0 sec  Urinalysis Basic - ( 2020 11:21 )    Color: Yellow / Appearance: Clear / S.025 / pH: x  Gluc: x / Ketone: Trace mg/dL  / Bili: Negative / Urobili: 0.2 E.U./dL   Blood: x / Protein: 100 mg/dL / Nitrite: NEGATIVE   Leuk Esterase: NEGATIVE / RBC: < 5 /HPF / WBC 5-10 /HPF   Sq Epi: x / Non Sq Epi: 0-5 /HPF / Bacteria: Present /HPF      CARDIAC MARKERS ( 2020 07:59 )  x     / <0.01 ng/mL / 1228 U/L / x     / 14.4 ng/mL      Serum Pro-Brain Natriuretic Peptide: 67 pg/mL ( @ 07:59)    Lactate, Blood: 1.7 mmol/L ( @ 12:01)      RADIOLOGY, EKG & ADDITIONAL TESTS:

## 2020-07-06 NOTE — H&P ADULT - PROBLEM SELECTOR PLAN 2
Pt with new onset A-fib, alcohol likely precipitating factor, no prior history. CT PE negative for definite PE. S/p Diltiazem 20mg IV X2 and 60mg po X1 without significant improvement. Lopressor 5mg IV x3 improved his status. Currently hemodynamically stable. CXR without pulmonary vascular congestion, trops negative x1. CHADsVASC 0   - may need a 4th Lopressor 5mg IV x1 and re-evaluate  - f/u TTE  - f/u TSH, free T4.   - hold off on anticoagulation for now

## 2020-07-06 NOTE — ED PROVIDER NOTE - CLINICAL SUMMARY MEDICAL DECISION MAKING FREE TEXT BOX
55 y/o M with PMHx lung CA presents to the ED complaining of mid chest pain, mild cough, and SOB that started today. 45 minutes of critical care given as pt became diaphoretic and tremulous, and HR increased to 170. Labs pending. Will admit to intensive care for alcohol withdrawal. 53 y/o M with PMHx lung CA presents to the ED complaining of mid chest pain, mild cough, and SOB that started today.   While in the ED, pt started to become diaphoretic and tremulous. HR went up from 104 to 170 and was noted to be in A-FIb. Upon further questioning, pt states he has hx of alcohol abuse and drinks 3 beers a day. States he has been in withdrawal in the past, however denies seizures or DTs in the past. Denies hx of A-Fib or being on any anticoagulants. Denies other drug use. States he is a smoker and still smokes a few cigarettes a day. Pt given 2mg IV Ativan, Librium and cardizem. Labs/ studies noted. Pt with improvement of HR to 120s after Cardizem. MICU consulted and pt admitted to medicine tele for further management of alcohol withdrawal.

## 2020-07-06 NOTE — ED PROVIDER NOTE - DIAGNOSTIC INTERPRETATION
CXR wet read: The heart appears to be within normal limits.  No acute infiltrates, effusions or evidence of pulmonary vascular congestion.  The chest wall and surrounding bony structures with no acute fxs.

## 2020-07-06 NOTE — H&P ADULT - PROBLEM SELECTOR PLAN 1
CIWA 12 on presentation, pt noted to be hypertensive, tachycardic with HR 170s, diaphoretic and tremulous. Received 2mg Ativan x1, Librium 25mg x1. Last drink 7/5, unclear time.   Currently pt with CIWA 14, for tremors, photosensitivity, agitation and nausea.    consider starting Librium 50 mg q8h   - Ativan 2mg PRN for CIWA >8   - c/w D5 /hr   - start folic acid, multivitamin, and thiamine daily  - CIWA protocol   - SBIRT

## 2020-07-06 NOTE — H&P ADULT - NSHPPHYSICALEXAM_GEN_ALL_CORE
PHYSICAL EXAM:  GEN: diaphoretic,  male, appears uncomfortable   HEENT:  EOMI, dry MM, no pharyngeal erythema or exudate  CV: tachycardic, irregular, S1/S2, no murmurs appreciated, no JVD, no carotid bruits  RESP: CTA bilaterally, good respiratory effort, good air movement, no wheezes/rales  ABD: soft, BS+, nontender, nondistended, no guarding/rebound  EXTREMITIES: slightly cool upper extremities, pulses 2+ in all 4 extremities, no peripheral edema  MSK: full range of motion of all 4 extremities  SKIN: dry, intact, no rashes  NEURO: A&Ox3, no focal deficits, strength 5/5 throughout, no sensory deficits. Tremulous.   PSYC: normal mood/affect

## 2020-07-06 NOTE — CONSULT NOTE ADULT - ASSESSMENT
#Alcohol withdrawal - CIWA 12 on presentation, pt noted to be hypertensive, tachycardic with HR 170s, diaphoretic and tremulous. Received 2mg Ativan x1, Librium 25mg x1. Last drink 7/5, unclear time  - will start Librium 50 mg q8h   - Ativan 2mg PRN for CIWA >8     #AGMA - AG elevated to 24 on BMP with CO2 19. Likely 2/2 alcohol intoxication. Alcohol level 68  - f/u lactate   - f/u UA for ketones 54M undomiciled, with PMH EtOH abuse (h/o withdrawal in the past, no DTs, intubations), VISHNU neoplasm (currently getting chemo at [facility]), presents with chest discomfort, found to have alcohol withdrawal c/b new onset atrial fibrillation w/ RVR.     #Alcohol withdrawal - CIWA 12 on presentation, pt noted to be hypertensive, tachycardic with HR 170s, diaphoretic and tremulous. Received 2mg Ativan x1, Librium 25mg x1. Last drink 7/5, unclear time. Currently pt with CIWA 11, for tremors, photosensitivity and nausea. However pt noted to be snoring on exam.   - consider starting Librium 50 mg q8h   - Ativan 2mg PRN for CIWA >8   - c/w D5 /hr   - start folic acid, multivitamin, and thiamine daily  - CIWA protocol   - SBIRT     #Atrial fibrillation w/ RVR- Pt with new onset A-fib, alcohol likely precipitating factor, no prior history. CT PE negative for definite PE. S/p Diltiazem 20mg IV X2 and 60mg po X1 without significant improvement. Currently hemodynamically stable. CXR without pulmonary vascular congestion, trops negative x1. CHADsVASC 0   - give Lopressor 5mg IV x1 and re-evaluate, he may need additional lopressor   - f/u TTE  - f/u TSH, free T4.   - hold off on anticoagulation for now    #AGMA - AG elevated to 24 on BMP with CO2 19. Likely 2/2 alcoholic ketoacidosis. Alcohol level 68. UA with trace ketones   - f/u lactate, repeat CMP   - c/w D5 /hr for now     #Hematuria  -urine with moderate blood without RBC, may have rhabdomyolysis   - f/u creatine kinase  - c/w aggressive hydration for now     #Chest pain - patient with history of previous admission for similar type of chest pain. Trop negative x1. Clinical picture does not appear to be ischemic. EKG however does reveal new A-fib with RVR. Pt with slight epigastric tenderness on exam   - f/u repeat EKG  - f/u echo  - no longer need to trend trop   - trial of pepcid (pt does have a hiatal hernia incidentally found on CT PE)          Dispo: Medical telemetry   Plan discussed with ED physician and Dr. Subramanian. 54M undomiciled, with PMH EtOH abuse (h/o withdrawal in the past, no DTs, intubations), VISHNU neoplasm (currently getting chemo at [place]), presents with chest discomfort, found to have alcohol withdrawal c/b new onset atrial fibrillation w/ RVR.     #Alcohol withdrawal - CIWA 12 on presentation, pt noted to be hypertensive, tachycardic with HR 170s, diaphoretic and tremulous. Received 2mg Ativan x1, Librium 25mg x1. Last drink 7/5, unclear time. Currently pt with CIWA 11, for tremors, photosensitivity and nausea. However pt noted to be snoring on exam.   - consider starting Librium 50 mg q8h   - Ativan 2mg PRN for CIWA >8   - c/w D5 /hr   - start folic acid, multivitamin, and thiamine daily  - CIWA protocol   - SBIRT     #Atrial fibrillation w/ RVR- Pt with new onset A-fib, alcohol likely precipitating factor, no prior history. CT PE negative for definite PE. S/p Diltiazem 20mg IV X2 and 60mg po X1 without significant improvement. Currently hemodynamically stable. CXR without pulmonary vascular congestion, trops negative x1. CHADsVASC 0   - give Lopressor 5mg IV x1 and re-evaluate, he may need additional lopressor   - f/u TTE  - f/u TSH, free T4.   - hold off on anticoagulation for now    #AGMA - AG elevated to 24 on BMP with CO2 19. Likely 2/2 alcoholic ketoacidosis. Alcohol level 68. UA with trace ketones   - f/u lactate, repeat CMP   - c/w D5 /hr for now     #Hematuria  -urine with moderate blood without RBC, may have rhabdomyolysis   - f/u creatine kinase  - c/w aggressive hydration for now     #Chest pain - patient with history of previous admission for similar type of chest pain. Trop negative x1. Clinical picture does not appear to be ischemic. EKG however does reveal new A-fib with RVR. Pt with slight epigastric tenderness on exam   - f/u repeat EKG  - f/u echo  - no longer need to trend trop   - trial of pepcid (pt does have a hiatal hernia incidentally found on CT PE)    #VISHNU neoplasm - Seen on previous CT, size reduced on current CT. Pt reported getting chemotherapy at [facility], but unable to recall doctor or diagnosis.  - obtain further collateral      Dispo: Medical telemetry   Plan discussed with ED physician and Dr. Subramanian.

## 2020-07-06 NOTE — CONSULT NOTE ADULT - SUBJECTIVE AND OBJECTIVE BOX
ICU CONSULT NOTE    CHIEF COMPLAINT: Patient is a 54y old  Male who presents with a chief complaint of chest discomfort.     54M w/ PMH EtOH abuse, ?lung cancer on chemotherapy (at [place], last done 1 month ago), poor historian, presenting to the ED with complaints of chest discomfort that started this morning. Per ED chart, he had reported not feeling well with complaints of mild cough, shortness of breath. Currently residing at a shelter. Pt not forthcoming with information. In the ED, patient acutely became diaphoretic, tachycardic with HR 160s. EKG performed at the time which revealed rapid Afib. ICU consulted for Rapid afib in the setting of alcohol withdrawal.       In the ED: Initial vital signs: T: 98 F, HR: 114--> 170, BP: 119//82, R: XX, SpO2: XX% on RA  ED course: s/p 2L NS, Librium 25mg x1, Ativan 2mg x1,   Labs: significant for lipase   Imaging:  CXR:   EKG:   Medications:   Consults: none       PAST MEDICAL & SURGICAL HISTORY:  Alcoholism /alcohol abuse  No significant past surgical history      REVIEW OF SYSTEMS: negative except as stated in HPI.    MEDICATIONS  (STANDING):    MEDICATIONS  (PRN):      Allergies    No Known Allergies    Intolerances        FAMILY HISTORY:  No pertinent family history in first degree relatives: cad      SOCIAL HISTORY:     Vital Signs Last 24 Hrs  T(C): 36.9 (06 Jul 2020 07:57), Max: 36.9 (06 Jul 2020 07:57)  T(F): 98.4 (06 Jul 2020 07:57), Max: 98.4 (06 Jul 2020 07:57)  HR: 130 (06 Jul 2020 09:05) (114 - 170)  BP: 148/82 (06 Jul 2020 07:57) (119/85 - 148/82)  BP(mean): --  RR: 19 (06 Jul 2020 07:57) (18 - 19)  SpO2: 100% (06 Jul 2020 07:57) (98% - 100%)    PHYSICAL EXAM:  GEN: alert, NAD, comfortable in bed  HEENT: PERRL, no relative afferent pupillary defect, EOMI, moist MM, no pharyngeal erythema or exudate  CV: RRR, S1/S2, no murmurs appreciated, no JVD, no carotid bruits  RESP: CTA bilaterally, good respiratory effort, good air movement, no wheezes/rales  ABD: soft, BS+, nontender, nondistended, no guarding/rebound  EXTREMITIES: WWP, pulses 2+ in all 4 extremities, no peripheral edema  MSK: full range of motion of all 4 extremities  SKIN: warm, dry, intact, no rashes  NEURO: A&Ox3, no focal deficits, strength 5/5 throughout, no sensory deficits  PSYC: normal mood/affect    LABS: reviewed.    CAPILLARY BLOOD GLUCOSE                              14.3   5.08  )-----------( 223      ( 06 Jul 2020 07:59 )             42.1     07-06    135  |  92<L>  |  8   ----------------------------<  87  see note   |  19<L>  |  0.76    Ca    9.4      06 Jul 2020 07:59  Mg     2.1     07-06    TPro  9.4<H>  /  Alb  4.2  /  TBili  0.8  /  DBili  x   /  AST  see note  /  ALT  see note  /  AlkPhos  113  07-06    PT/INR - ( 06 Jul 2020 07:59 )   PT: 10.7 sec;   INR: 0.89          PTT - ( 06 Jul 2020 07:59 )  PTT:29.0 sec  LIVER FUNCTIONS - ( 06 Jul 2020 07:59 )  Alb: 4.2 g/dL / Pro: 9.4 g/dL / ALK PHOS: 113 U/L / ALT: see note U/L / AST: see note U/L / GGT: x               CARDIAC MARKERS ( 06 Jul 2020 07:59 )  x     / <0.01 ng/mL / x     / x     / x              RADIOLOGY AND ADDITIONAL TESTS: reviewed.    Chest XR:  EKG:  Echocardiogram: ICU CONSULT NOTE    CHIEF COMPLAINT: Patient is a 54y old  Male who presents with a chief complaint of chest discomfort.     54M undomiciled, with PMH EtOH abuse (with history of withdrawal but no history of DTs, seizures, intubations), poor historian, ?lung cancer on chemotherapy (at [place], last done 1 month ago), active smoker (a few cigarettes daily), presenting to the ED with complaints of chest discomfort that started this morning.     Describes the chest discomfort as     Per ED chart, he had reported not feeling well with complaints of mild cough, shortness of breath. Currently residing at a shelter. Patient poor historian. He was not forthcoming with information, but did notify he had been drinking 2-3 beers daily, last drink yesterday. In the ED, patient acutely became diaphoretic, tachycardic with HR 160s. EKG performed at the time which revealed rapid Afib. ICU consulted for Rapid afib in the setting of alcohol withdrawal. Pt denies history of atrial fibrillation or ever being on anticoagulants. Of note, patient has been seen in the ED several times, with the last time back in March 2019 for substernal chest pain/Shortness of breath and alcohol withdrawal. However patient eloped the next day.     In the ED: Initial vital signs: T: 98 F, HR: 114--> 170, BP: 119//82, R: 18, SpO2: 98% on RA. CIWA of 12 on presentation.   ED course: s/p 2L NS, Librium 25mg x1, Ativan 2mg x1, Diltiazem 20mg IV x2 and Diltiazem 60mg PO x1.   Labs: significant for lipase   Imaging: CXR with VISHNU nodule which appears slightly smaller than previous, no acute infiltrates or effusion   EKG:  Initial EKG with NSR, no acute ST or T wave changes, when HR was 160-170s, EKG with Afib w/ RVR.   Medications: none    PAST MEDICAL & SURGICAL HISTORY:  Alcoholism /alcohol abuse  No significant past surgical history      REVIEW OF SYSTEMS: negative except as stated in HPI.    MEDICATIONS  (STANDING):    MEDICATIONS  (PRN):      Allergies    No Known Allergies    Intolerances        FAMILY HISTORY:  No pertinent family history in first degree relatives: cad      SOCIAL HISTORY:     Vital Signs Last 24 Hrs  T(C): 36.9 (06 Jul 2020 07:57), Max: 36.9 (06 Jul 2020 07:57)  T(F): 98.4 (06 Jul 2020 07:57), Max: 98.4 (06 Jul 2020 07:57)  HR: 130 (06 Jul 2020 09:05) (114 - 170)  BP: 148/82 (06 Jul 2020 07:57) (119/85 - 148/82)  BP(mean): --  RR: 19 (06 Jul 2020 07:57) (18 - 19)  SpO2: 100% (06 Jul 2020 07:57) (98% - 100%)    PHYSICAL EXAM:  GEN: diaphoretic,  male, appears uncomfortable   HEENT:  EOMI, dry MM, no pharyngeal erythema or exudate  CV: tachycardic, irregular, S1/S2, no murmurs appreciated, no JVD, no carotid bruits  RESP: CTA bilaterally, good respiratory effort, good air movement, no wheezes/rales  ABD: soft, BS+, nontender, nondistended, no guarding/rebound  EXTREMITIES: slightly cool upper extremities, pulses 2+ in all 4 extremities, no peripheral edema  MSK: full range of motion of all 4 extremities  SKIN: dry, intact, no rashes  NEURO: A&Ox3, no focal deficits, strength 5/5 throughout, no sensory deficits. Tremulous.   PSYC: normal mood/affect    LABS: reviewed.    CAPILLARY BLOOD GLUCOSE                              14.3   5.08  )-----------( 223      ( 06 Jul 2020 07:59 )             42.1     07-06    135  |  92<L>  |  8   ----------------------------<  87  see note   |  19<L>  |  0.76    Ca    9.4      06 Jul 2020 07:59  Mg     2.1     07-06    TPro  9.4<H>  /  Alb  4.2  /  TBili  0.8  /  DBili  x   /  AST  see note  /  ALT  see note  /  AlkPhos  113  07-06    PT/INR - ( 06 Jul 2020 07:59 )   PT: 10.7 sec;   INR: 0.89          PTT - ( 06 Jul 2020 07:59 )  PTT:29.0 sec  LIVER FUNCTIONS - ( 06 Jul 2020 07:59 )  Alb: 4.2 g/dL / Pro: 9.4 g/dL / ALK PHOS: 113 U/L / ALT: see note U/L / AST: see note U/L / GGT: x               CARDIAC MARKERS ( 06 Jul 2020 07:59 )  x     / <0.01 ng/mL / x     / x     / x              RADIOLOGY AND ADDITIONAL TESTS: reviewed.    Chest XR:  EKG:  Echocardiogram: ICU CONSULT NOTE    CHIEF COMPLAINT: Patient is a 54y old  Male who presents with a chief complaint of chest discomfort.     54M undomiciled, with PMH EtOH abuse (with history of withdrawal but no history of DTs, seizures, intubations), poor historian, ?lung cancer on chemotherapy (at [place], last done 1 month ago), active smoker (a few cigarettes daily), presenting to the ED with complaints of chest discomfort that started this morning. Describes the chest discomfort as substernal, sharp, intermittent, without any radiation, no alleviating or exacerbating factors. No history of CAD or previous cardiac workup.  Per ED chart, he had reported not feeling well with complaints of mild cough, shortness of breath. Currently residing at a shelter. Patient poor historian. He was not forthcoming with information, but did notify he had been drinking 2-3 beers daily, last drink yesterday. In the ED, patient acutely became diaphoretic, tachycardic with HR 160s. EKG performed at the time which revealed rapid Afib. ICU consulted for Rapid afib in the setting of alcohol withdrawal. Pt denies history of atrial fibrillation or ever being on anticoagulants. Of note, patient has been seen in the ED several times, with the last time back in March 2019 for substernal chest pain/Shortness of breath and alcohol withdrawal. However patient eloped the next day.     In the ED: Initial vital signs: T: 98 F, HR: 114--> 170, BP: 119//82, R: 18, SpO2: 98% on RA. CIWA of 12 on presentation.   ED course: s/p 2L NS, Librium 25mg x1, Ativan 2mg x1, Diltiazem 20mg IV x2 and Diltiazem 60mg PO x1.   Labs: significant for lipase   Imaging: CXR with VISHNU nodule which appears slightly smaller than previous, no acute infiltrates or effusion   EKG:  Initial EKG with NSR, no acute ST or T wave changes, when HR was 160-170s, EKG with Afib w/ RVR.   Medications: none    PAST MEDICAL & SURGICAL HISTORY:  Alcoholism /alcohol abuse    No significant past surgical history      REVIEW OF SYSTEMS: negative except as stated in HPI.    MEDICATIONS  (STANDING):  MEDICATIONS  (PRN):      Allergies  No Known Allergies  Intolerances    FAMILY HISTORY:  No pertinent family history in first degree relatives: cad      SOCIAL HISTORY:     Vital Signs Last 24 Hrs  T(C): 36.9 (06 Jul 2020 07:57), Max: 36.9 (06 Jul 2020 07:57)  T(F): 98.4 (06 Jul 2020 07:57), Max: 98.4 (06 Jul 2020 07:57)  HR: 130 (06 Jul 2020 09:05) (114 - 170)  BP: 148/82 (06 Jul 2020 07:57) (119/85 - 148/82)  BP(mean): --  RR: 19 (06 Jul 2020 07:57) (18 - 19)  SpO2: 100% (06 Jul 2020 07:57) (98% - 100%)    PHYSICAL EXAM:  GEN: diaphoretic,  male, appears uncomfortable   HEENT:  EOMI, dry MM, no pharyngeal erythema or exudate  CV: tachycardic, irregular, S1/S2, no murmurs appreciated, no JVD, no carotid bruits  RESP: CTA bilaterally, good respiratory effort, good air movement, no wheezes/rales  ABD: soft, BS+, nontender, nondistended, no guarding/rebound  EXTREMITIES: slightly cool upper extremities, pulses 2+ in all 4 extremities, no peripheral edema  MSK: full range of motion of all 4 extremities  SKIN: dry, intact, no rashes  NEURO: A&Ox3, no focal deficits, strength 5/5 throughout, no sensory deficits. Tremulous.   PSYC: normal mood/affect    LABS: reviewed.    CAPILLARY BLOOD GLUCOSE                              14.3   5.08  )-----------( 223      ( 06 Jul 2020 07:59 )             42.1     07-06    135  |  92<L>  |  8   ----------------------------<  87  see note   |  19<L>  |  0.76    Ca    9.4      06 Jul 2020 07:59  Mg     2.1     07-06    TPro  9.4<H>  /  Alb  4.2  /  TBili  0.8  /  DBili  x   /  AST  see note  /  ALT  see note  /  AlkPhos  113  07-06    PT/INR - ( 06 Jul 2020 07:59 )   PT: 10.7 sec;   INR: 0.89          PTT - ( 06 Jul 2020 07:59 )  PTT:29.0 sec  LIVER FUNCTIONS - ( 06 Jul 2020 07:59 )  Alb: 4.2 g/dL / Pro: 9.4 g/dL / ALK PHOS: 113 U/L / ALT: see note U/L / AST: see note U/L / GGT: x               CARDIAC MARKERS ( 06 Jul 2020 07:59 )  x     / <0.01 ng/mL / x     / x     / x        RADIOLOGY AND ADDITIONAL TESTS: reviewed. ICU CONSULT NOTE    CHIEF COMPLAINT: Patient is a 54y old  Male who presents with a chief complaint of chest discomfort.     54M undomiciled, with PMH EtOH abuse (with history of withdrawal but no history of DTs, seizures, intubations), poor historian, ?lung cancer on chemotherapy (at [place], last done 1 month ago), active smoker (a few cigarettes daily), presenting to the ED with complaints of chest discomfort that started this morning. Describes the chest discomfort as substernal, sharp, intermittent, without any radiation, no alleviating or exacerbating factors. No history of CAD or previous cardiac workup.  Per ED chart, he had reported not feeling well with complaints of mild cough, shortness of breath. Currently residing at a shelter. Patient poor historian. He was not forthcoming with information, but did notify he had been drinking 2-3 beers daily, last drink yesterday. In the ED, patient acutely became diaphoretic, tachycardic with HR 160s. EKG performed at the time which revealed rapid Afib. ICU consulted for Rapid afib in the setting of alcohol withdrawal. Pt denies history of atrial fibrillation or ever being on anticoagulants. Of note, patient has been seen in the ED several times, with the last time back in March 2019 for substernal chest pain/Shortness of breath and alcohol withdrawal. However patient eloped the next day.     In the ED: Initial vital signs: T: 98 F, HR: 114--> 170, BP: 119//82, R: 18, SpO2: 98% on RA. CIWA of 12 on presentation.   ED course: s/p 2L NS, Librium 25mg x1, Ativan 2mg x1, Diltiazem 20mg IV x2 and Diltiazem 60mg PO x1.   Labs: significant for lipase 387, H/H stable, no leukocytosis, BMP with elevated AG 24, trop negative, Alcohol level 68, UTox negative, UA with trace ketones, moderate blood no RBCs.   Imaging: CXR with VISHNU nodule which appears slightly smaller than previous, no acute infiltrates or effusion   EKG:  Initial EKG with NSR, no acute ST or T wave changes, when HR was 160-170s, EKG with Afib w/ RVR.   Medications: none    PAST MEDICAL & SURGICAL HISTORY:  Alcoholism /alcohol abuse    No significant past surgical history      REVIEW OF SYSTEMS: negative except as stated in HPI.    MEDICATIONS  (STANDING):  MEDICATIONS  (PRN):      Allergies  No Known Allergies  Intolerances    FAMILY HISTORY:  No pertinent family history in first degree relatives: cad      SOCIAL HISTORY:     Vital Signs Last 24 Hrs  T(C): 36.9 (06 Jul 2020 07:57), Max: 36.9 (06 Jul 2020 07:57)  T(F): 98.4 (06 Jul 2020 07:57), Max: 98.4 (06 Jul 2020 07:57)  HR: 130 (06 Jul 2020 09:05) (114 - 170)  BP: 148/82 (06 Jul 2020 07:57) (119/85 - 148/82)  BP(mean): --  RR: 19 (06 Jul 2020 07:57) (18 - 19)  SpO2: 100% (06 Jul 2020 07:57) (98% - 100%)    PHYSICAL EXAM:  GEN: diaphoretic,  male, appears uncomfortable   HEENT:  EOMI, dry MM, no pharyngeal erythema or exudate  CV: tachycardic, irregular, S1/S2, no murmurs appreciated, no JVD, no carotid bruits  RESP: CTA bilaterally, good respiratory effort, good air movement, no wheezes/rales  ABD: soft, BS+, nontender, nondistended, no guarding/rebound  EXTREMITIES: slightly cool upper extremities, pulses 2+ in all 4 extremities, no peripheral edema  MSK: full range of motion of all 4 extremities  SKIN: dry, intact, no rashes  NEURO: A&Ox3, no focal deficits, strength 5/5 throughout, no sensory deficits. Tremulous.   PSYC: normal mood/affect    LABS: reviewed.    CAPILLARY BLOOD GLUCOSE                              14.3   5.08  )-----------( 223      ( 06 Jul 2020 07:59 )             42.1     07-06    135  |  92<L>  |  8   ----------------------------<  87  see note   |  19<L>  |  0.76    Ca    9.4      06 Jul 2020 07:59  Mg     2.1     07-06    TPro  9.4<H>  /  Alb  4.2  /  TBili  0.8  /  DBili  x   /  AST  see note  /  ALT  see note  /  AlkPhos  113  07-06    PT/INR - ( 06 Jul 2020 07:59 )   PT: 10.7 sec;   INR: 0.89          PTT - ( 06 Jul 2020 07:59 )  PTT:29.0 sec  LIVER FUNCTIONS - ( 06 Jul 2020 07:59 )  Alb: 4.2 g/dL / Pro: 9.4 g/dL / ALK PHOS: 113 U/L / ALT: see note U/L / AST: see note U/L / GGT: x               CARDIAC MARKERS ( 06 Jul 2020 07:59 )  x     / <0.01 ng/mL / x     / x     / x        RADIOLOGY AND ADDITIONAL TESTS: reviewed.

## 2020-07-06 NOTE — ED PROVIDER NOTE - NEUROLOGICAL, MLM
Alert and oriented, no focal deficits, no motor or sensory deficits. Alert and oriented, no focal deficit

## 2020-07-06 NOTE — ED ADULT NURSE NOTE - OBJECTIVE STATEMENT
Pt A/OX3, no acute distress, breathing with ease on room air. pt noted to be tachycardiac at this time. MD at bedside, pt placed on a continues cardiac monitor.

## 2020-07-06 NOTE — ED ADULT NURSE REASSESSMENT NOTE - NS ED NURSE REASSESS COMMENT FT1
pt endorsed to me from night shift RN, pt noted  diaphoretic and tachycardic . MD Charlton made aware, repeat ekg done, IVF initiated, ativan and librium given ad per ordered. closely monitoring.

## 2020-07-06 NOTE — ED PROVIDER NOTE - PROGRESS NOTE DETAILS
While in the ED, pt started to become diaphoretic and tremulous. HR went up from 104 to 170 and was noted to be in A-FIb. Upon further questioning, pt states he is an alcoholic and drinks 3 beers a day. States he has been in withdrawal in the past, however denies seizures or DTs in the past. Denies hx of A-Fib or being on any anticoagulants. Denies other drug use. States he is a smoker and still smokes a few cigarettes a day. Pt given 2mg IV librium, ativan and cardizem. While in the ED, pt started to become diaphoretic and tremulous. HR went up from 104 to 170 and was noted to be in A-FIb. Upon further questioning, pt states he has hx of alcohol abuse and drinks 3 beers a day. States he has been in withdrawal in the past, however denies seizures or DTs in the past. Denies hx of A-Fib or being on any anticoagulants. Denies other drug use. States he is a smoker and still smokes a few cigarettes a day. Pt given 2mg IV Ativan, Librium and cardizem.

## 2020-07-06 NOTE — ED PROVIDER NOTE - OBJECTIVE STATEMENT
55 y/o M with PMHx lung CA for which he receives treatment at [facility] (last chemotherapy was a month ago), currently lives in a shelter, presents to the ED c/o mid chest pain that started today. States he is "not feeling good" and presents for further evaluation. Also c/o of mild cough and some SOB that started earlier today. Pt is a poor historian and noncompliant. States he only wants to sleep and is very reticent to answering questions. Denies any hx of heart disease. 55 y/o M, poor historian, with PMHx lung CA for which he receives treatment at [facility] (last chemotherapy was a month ago), currently lives in a shelter, presents to the ED c/o mid chest pain that started today. States he is "not feeling good" and presents for further evaluation. Also c/o of mild cough and some SOB that started earlier today. Pt is a poor historian and noncompliant. States he only wants to sleep and is very reticent to answering questions. Denies any hx of heart disease.

## 2020-07-06 NOTE — H&P ADULT - PROBLEM SELECTOR PLAN 4
patient with history of previous admission for similar type of chest pain. Trop negative x1. Clinical picture does not appear to be ischemic. EKG however does reveal new A-fib with RVR. Pt with slight epigastric tenderness on exam   - f/u repeat EKG  - f/u echo  - no longer need to trend trop   - trial of pepcid (pt does have a hiatal hernia incidentally found on CT PE)

## 2020-07-06 NOTE — H&P ADULT - PROBLEM SELECTOR PLAN 6
-urine with moderate blood without RBC, may have rhabdomyolysis   - f/u creatine kinase  - c/w aggressive hydration for now

## 2020-07-06 NOTE — H&P ADULT - ASSESSMENT
54M undomiciled, with PMH EtOH abuse (h/o withdrawal in the past, no DTs, intubations), VIHSNU neoplasm (currently getting chemo at [facility]), presents with chest discomfort, found to have alcohol withdrawal c/b new onset atrial fibrillation w/ RVR.

## 2020-07-06 NOTE — H&P ADULT - PROBLEM SELECTOR PLAN 5
Seen on previous CT, size reduced on current CT. Pt reported getting chemotherapy at [facility], but unable to recall doctor or diagnosis.

## 2020-07-06 NOTE — ED PROVIDER NOTE - CARDIAC, MLM
Mildly tachycardic, regular rhythm.  Heart sounds S1, S2.  No murmurs, rubs or gallops. Mildly tachycardic, regular rhythm.  Heart sounds S1, S2.

## 2020-07-06 NOTE — H&P ADULT - HISTORY OF PRESENT ILLNESS
54M undomiciled, with PMH EtOH abuse (with history of withdrawal but no history of DTs, seizures, intubations), poor historian, lung cancer on chemotherapy (at [place], last done 1 month ago but stopped going), active smoker (a few cigarettes daily), presenting to the ED with complaints of chest discomfort that started this morning. Describes the chest discomfort as substernal, sharp, intermittent, without any radiation, no alleviating or exacerbating factors. No history of CAD or previous cardiac workup.  Per ED chart, he had reported not feeling well with complaints of mild cough, shortness of breath and nausea. Currently residing at a shelter. Patient poor historian. He was not forthcoming with information, but did notify he had been drinking 2-3 beers daily, last drink yesterday. In the ED, patient acutely became diaphoretic, tachycardic and tremulous with HR 160s. EKG performed at the time which revealed AFib w/ RVR. ICU consulted for AFib RVR in the setting of alcohol withdrawal. Pt denies history of atrial fibrillation or ever being on anticoagulants. Of note, patient has been seen in the ED several times, with the last time back in March 2019 for substernal chest pain/Shortness of breath and alcohol withdrawal. However patient eloped the next day. Current CIWA 14.    In the ED: Initial vital signs: T: 98 F, HR: 114--> 170, BP: 119//82, R: 18, SpO2: 98% on RA. CIWA of 12 on presentation.   ED course: s/p 2L NS, Librium 25mg x1, Ativan 2mg x1, Diltiazem 20mg IV x2 and Diltiazem 60mg PO x1. Metoprolol tartrate 5mg IVP x3  Labs: significant for lipase 387, H/H stable, no leukocytosis, BMP with elevated AG 24, trop negative, Alcohol level 68, UTox negative, UA with trace ketones, moderate blood no RBCs.   Imaging: CXR with VISHNU nodule which appears slightly smaller than previous, no acute infiltrates or effusion   EKG:  Initial EKG with NSR, no acute ST or T wave changes, when HR was 160-170s, EKG with Afib w/ RVR.

## 2020-07-07 VITALS
SYSTOLIC BLOOD PRESSURE: 93 MMHG | OXYGEN SATURATION: 95 % | DIASTOLIC BLOOD PRESSURE: 61 MMHG | HEART RATE: 151 BPM | RESPIRATION RATE: 20 BRPM

## 2020-07-07 DIAGNOSIS — R11.0 NAUSEA: ICD-10-CM

## 2020-07-07 LAB
CULTURE RESULTS: NO GROWTH — SIGNIFICANT CHANGE UP
SARS-COV-2 RNA SPEC QL NAA+PROBE: SIGNIFICANT CHANGE UP
SPECIMEN SOURCE: SIGNIFICANT CHANGE UP

## 2020-07-07 PROCEDURE — 96374 THER/PROPH/DIAG INJ IV PUSH: CPT | Mod: XU

## 2020-07-07 PROCEDURE — 93306 TTE W/DOPPLER COMPLETE: CPT | Mod: 26

## 2020-07-07 PROCEDURE — 80053 COMPREHEN METABOLIC PANEL: CPT

## 2020-07-07 PROCEDURE — 80307 DRUG TEST PRSMV CHEM ANLYZR: CPT

## 2020-07-07 PROCEDURE — 93005 ELECTROCARDIOGRAM TRACING: CPT | Mod: 77

## 2020-07-07 PROCEDURE — 99284 EMERGENCY DEPT VISIT MOD MDM: CPT

## 2020-07-07 PROCEDURE — 83690 ASSAY OF LIPASE: CPT

## 2020-07-07 PROCEDURE — 96361 HYDRATE IV INFUSION ADD-ON: CPT

## 2020-07-07 PROCEDURE — 83735 ASSAY OF MAGNESIUM: CPT

## 2020-07-07 PROCEDURE — 83880 ASSAY OF NATRIURETIC PEPTIDE: CPT

## 2020-07-07 PROCEDURE — 99233 SBSQ HOSP IP/OBS HIGH 50: CPT | Mod: GC

## 2020-07-07 PROCEDURE — 93306 TTE W/DOPPLER COMPLETE: CPT

## 2020-07-07 PROCEDURE — 96376 TX/PRO/DX INJ SAME DRUG ADON: CPT

## 2020-07-07 PROCEDURE — 82550 ASSAY OF CK (CPK): CPT

## 2020-07-07 PROCEDURE — 81001 URINALYSIS AUTO W/SCOPE: CPT

## 2020-07-07 PROCEDURE — 96375 TX/PRO/DX INJ NEW DRUG ADDON: CPT | Mod: XU

## 2020-07-07 PROCEDURE — 85379 FIBRIN DEGRADATION QUANT: CPT

## 2020-07-07 PROCEDURE — 87086 URINE CULTURE/COLONY COUNT: CPT

## 2020-07-07 PROCEDURE — 82553 CREATINE MB FRACTION: CPT

## 2020-07-07 PROCEDURE — 93010 ELECTROCARDIOGRAM REPORT: CPT

## 2020-07-07 PROCEDURE — 85610 PROTHROMBIN TIME: CPT

## 2020-07-07 PROCEDURE — U0003: CPT

## 2020-07-07 PROCEDURE — 85025 COMPLETE CBC W/AUTO DIFF WBC: CPT

## 2020-07-07 PROCEDURE — 99291 CRITICAL CARE FIRST HOUR: CPT | Mod: 25

## 2020-07-07 PROCEDURE — 83605 ASSAY OF LACTIC ACID: CPT

## 2020-07-07 PROCEDURE — 36415 COLL VENOUS BLD VENIPUNCTURE: CPT

## 2020-07-07 PROCEDURE — C1894: CPT

## 2020-07-07 PROCEDURE — 84443 ASSAY THYROID STIM HORMONE: CPT

## 2020-07-07 PROCEDURE — 71045 X-RAY EXAM CHEST 1 VIEW: CPT

## 2020-07-07 PROCEDURE — 84100 ASSAY OF PHOSPHORUS: CPT

## 2020-07-07 PROCEDURE — 85730 THROMBOPLASTIN TIME PARTIAL: CPT

## 2020-07-07 PROCEDURE — 84484 ASSAY OF TROPONIN QUANT: CPT

## 2020-07-07 PROCEDURE — 71275 CT ANGIOGRAPHY CHEST: CPT

## 2020-07-07 RX ORDER — METOPROLOL TARTRATE 50 MG
10 TABLET ORAL EVERY 6 HOURS
Refills: 0 | Status: DISCONTINUED | OUTPATIENT
Start: 2020-07-07 | End: 2020-07-07

## 2020-07-07 RX ORDER — DIGOXIN 250 MCG
0.25 TABLET ORAL ONCE
Refills: 0 | Status: COMPLETED | OUTPATIENT
Start: 2020-07-07 | End: 2020-07-07

## 2020-07-07 RX ORDER — SODIUM CHLORIDE 9 MG/ML
1000 INJECTION, SOLUTION INTRAVENOUS ONCE
Refills: 0 | Status: COMPLETED | OUTPATIENT
Start: 2020-07-07 | End: 2020-07-07

## 2020-07-07 RX ORDER — GABAPENTIN 400 MG/1
100 CAPSULE ORAL THREE TIMES A DAY
Refills: 0 | Status: DISCONTINUED | OUTPATIENT
Start: 2020-07-07 | End: 2020-07-07

## 2020-07-07 RX ORDER — METOPROLOL TARTRATE 50 MG
5 TABLET ORAL EVERY 6 HOURS
Refills: 0 | Status: DISCONTINUED | OUTPATIENT
Start: 2020-07-07 | End: 2020-07-07

## 2020-07-07 RX ORDER — ONDANSETRON 8 MG/1
4 TABLET, FILM COATED ORAL ONCE
Refills: 0 | Status: COMPLETED | OUTPATIENT
Start: 2020-07-07 | End: 2020-07-07

## 2020-07-07 RX ORDER — METOPROLOL TARTRATE 50 MG
5 TABLET ORAL ONCE
Refills: 0 | Status: COMPLETED | OUTPATIENT
Start: 2020-07-07 | End: 2020-07-07

## 2020-07-07 RX ADMIN — SODIUM CHLORIDE 1000 MILLILITER(S): 9 INJECTION, SOLUTION INTRAVENOUS at 00:51

## 2020-07-07 RX ADMIN — Medication 1 MILLIGRAM(S): at 12:05

## 2020-07-07 RX ADMIN — GABAPENTIN 100 MILLIGRAM(S): 400 CAPSULE ORAL at 10:31

## 2020-07-07 RX ADMIN — GABAPENTIN 100 MILLIGRAM(S): 400 CAPSULE ORAL at 13:37

## 2020-07-07 RX ADMIN — Medication 50 MILLIGRAM(S): at 05:13

## 2020-07-07 RX ADMIN — Medication 100 MILLIGRAM(S): at 13:37

## 2020-07-07 RX ADMIN — Medication 1 TABLET(S): at 12:05

## 2020-07-07 RX ADMIN — Medication 0.25 MILLIGRAM(S): at 00:51

## 2020-07-07 RX ADMIN — Medication 0.25 MILLIGRAM(S): at 05:13

## 2020-07-07 RX ADMIN — Medication 5 MILLIGRAM(S): at 08:40

## 2020-07-07 RX ADMIN — ONDANSETRON 4 MILLIGRAM(S): 8 TABLET, FILM COATED ORAL at 12:05

## 2020-07-07 NOTE — PROGRESS NOTE ADULT - PROBLEM SELECTOR PLAN 4
patient with history of previous admission for similar type of chest pain. Trop negative x1. Clinical picture does not appear to be ischemic. EKG however does reveal new A-fib with RVR. Pt with slight epigastric tenderness on exam   - f/u repeat EKG  - f/u echo  - no longer need to trend trop   - trial of pepcid (pt does have a hiatal hernia incidentally found on CT PE) patient with history of previous admission for similar type of chest pain. Trop negative x1. Clinical picture does not appear to be ischemic. EKG however does reveal new A-fib with RVR. Pt with slight epigastric tenderness on exam   - f/u repeat EKG - 7/7 atrial tachy w/o afib  - f/u echo  - no longer need to trend trop   - trial of pepcid (pt does have a hiatal hernia incidentally found on CT PE)

## 2020-07-07 NOTE — CHART NOTE - NSCHARTNOTEFT_GEN_A_CORE
55 yo M demanded to leave the hospital AMA. Patient became argumentative in EP holding demanding to leave/ He pulled off all of the leads on his body and left the bed. Security was called. EP Dr. henderson explained to him the medical importance of this procedure and how not getting the procedure is life threatening. He was steadfast and remained wanting to leave. The nurses along w 2 security guards escorted him to his room where I along with PGY2 Georgie explained to him the severity of his medical condition and that to leave would not only be against medical advice but also kill him. He then signed his AMA form and left the hospital.

## 2020-07-07 NOTE — PROGRESS NOTE ADULT - PROBLEM SELECTOR PLAN 8
Plan  F: dextrose 5% + lactated ringers 150mL/hr  E: replete K<4, Mg<2  N: Plan  F: dextrose 5% + lactated ringers 150mL/hr  E: replete K<4, Mg<2  N: Regular

## 2020-07-07 NOTE — DISCHARGE NOTE PROVIDER - HOSPITAL COURSE
54M undomiciled, with PMH EtOH abuse (with history of withdrawal but no history of DTs, seizures, intubations), poor historian, lung cancer on chemotherapy (at [facility], last done 1 month ago but stopped going), active smoker (a few cigarettes daily), presenting to the ED with complaints of chest discomfort that started this morning. Describes the chest discomfort as substernal, sharp, intermittent, without any radiation, no alleviating or exacerbating factors. No history of CAD or previous cardiac workup.  Per ED chart, he had reported not feeling well with complaints of mild cough, shortness of breath and nausea. Currently residing at a shelter. Patient poor historian. He was not forthcoming with information, but did notify he had been drinking 2-3 beers daily, last drink yesterday. In the ED, patient acutely became diaphoretic, tachycardic and tremulous with HR 160s. EKG performed at the time which revealed Aflutter w/ RVR. ICU consulted for Aflutter RVR in the setting of alcohol withdrawal. Pt denies history of atrial fibrillation or ever being on anticoagulants. Of note, patient has been seen in the ED several times, with the last time back in March 2019 for substernal chest pain/Shortness of breath and alcohol withdrawal. However patient eloped the next day. Current CIWA 14. EP was consulted for Aflutter RVR patient. The plan of treatment was radioablation in RA, which the patient consented. Upon transporting pt to EP holding, patient stated that he would like to leave AMA and did not want the procedure done. EP attending Dr. Payne had a 15 minute conversation about the significant life threatening consequences of not treating his heart condition.  This was followed by both intern and resident having a 30-min-long conversation with Mr Alcala. He was informed that if his condition does not improve, he may eventually suffer from severe injury, mostly to his heart, which may finally lead to death. Mr Alcala voiced understanding, but he decided to leave the hospital despite that. Mr Alcala was informed that he may return to ED any time his symptoms worsen.        no new meds at discharge    no labs to follow    no imaging to follow    no follow up appointment scheduled

## 2020-07-07 NOTE — PROGRESS NOTE ADULT - SUBJECTIVE AND OBJECTIVE BOX
O/N pt vomitted once but CIWA <8 no ativan given. 3 does of .25 IVP digoxin given pt still in AFib w/ RVR  INTERVAL HPI  Patient was seen and examined at bedside. Was Afib w/ rvr -157. Patient endorses CP and palpitations. denies: fever, chills, dizziness, weakness, HA, Changes in vision, cough, N/V/D/C, dysuria, changes in bowel movements, LE edema. ROS otherwise negative. CIWA 5       VITAL SIGNS:  T(F): 99.7 (20 @ 05:00)  HR: 137 (20 @ 04:52)  BP: 108/70 (20 @ 04:52)  RR: 18 (20 @ 04:52)  SpO2: 98% (20 @ 04:52)  Wt(kg): --    PHYSICAL EXAM:    Constitutional: WDWN, NAD  HEENT: PERRL, EOMI, sclera non-icteric, neck supple, trachea midline, no masses, no JVD, MMM, good dentition  Respiratory: CTA b/l, good air entry b/l, no wheezing, no rhonchi, no rales, without accessory muscle use and no intercostal retractions  Cardiovascular: RRR, normal S1S2, no M/R/G  Gastrointestinal: soft, NTND, no masses palpable, BS normal  Extremities: Warm, well perfused, pulses equal bilateral upper and lower extremities, no edema, no clubbing. Capillary refill <2 sec  Neurological: AAOx3, CN Grossly intact  Skin: Normal temperature, warm, dry    MEDICATIONS  (STANDING):  chlordiazePOXIDE 50 milliGRAM(s) Oral every 8 hours  dextrose 5% + lactated ringers. 1000 milliLiter(s) (150 mL/Hr) IV Continuous <Continuous>  enoxaparin Injectable 40 milliGRAM(s) SubCutaneous every 24 hours  folic acid 1 milliGRAM(s) Oral daily  multivitamin 1 Tablet(s) Oral daily  thiamine 100 milliGRAM(s) Oral every 24 hours    MEDICATIONS  (PRN):  LORazepam   Injectable 2 milliGRAM(s) IV Push every 4 hours PRN CIWA >8      Allergies    No Known Allergies    Intolerances        LABS:                        14.3   5.08  )-----------( 223      ( 2020 07:59 )             42.1     07-06    135  |  96  |  8   ----------------------------<  80  4.2   |  21<L>  |  0.67    Ca    8.3<L>      2020 12:02  Phos  3.3     07-06  Mg     1.9     07-06    TPro  7.9  /  Alb  3.8  /  TBili  0.8  /  DBili  x   /  AST  56<H>  /  ALT  24  /  AlkPhos  103  07-06    PT/INR - ( 2020 07:59 )   PT: 10.7 sec;   INR: 0.89          PTT - ( 2020 07:59 )  PTT:29.0 sec  Urinalysis Basic - ( 2020 11:21 )    Color: Yellow / Appearance: Clear / S.025 / pH: x  Gluc: x / Ketone: Trace mg/dL  / Bili: Negative / Urobili: 0.2 E.U./dL   Blood: x / Protein: 100 mg/dL / Nitrite: NEGATIVE   Leuk Esterase: NEGATIVE / RBC: < 5 /HPF / WBC 5-10 /HPF   Sq Epi: x / Non Sq Epi: 0-5 /HPF / Bacteria: Present /HPF        RADIOLOGY & ADDITIONAL TESTS:  Reviewed O/N pt vomitted once but CIWA <8 no ativan given. 3 does of .25 IVP digoxin given pt still in AFib w/ RVR  INTERVAL HPI  Patient was seen and examined at bedside. W/ atrial tachycardia 140s. EP consulted. Patient endorses CP, palpitations & nausea. denies: fever, chills, dizziness, weakness, HA, Changes in vision, cough, N/V/D/C, dysuria, changes in bowel movements, LE edema. ROS otherwise negative. CIWA 5       VITAL SIGNS:  T(F): 99.7 (20 @ 05:00)  HR: 137 (20 @ 04:52)  BP: 108/70 (20 @ 04:52)  RR: 18 (20 @ 04:52)  SpO2: 98% (20 @ 04:52)  Wt(kg): --    PHYSICAL EXAM:    Constitutional: WDWN, NAD  HEENT: PERRL, EOMI, sclera non-icteric, neck supple, trachea midline, no masses, no JVD, MMM, good dentition  Respiratory: CTA b/l, good air entry b/l, no wheezing, no rhonchi, no rales, without accessory muscle use and no intercostal retractions  Cardiovascular: oberseved to be tachy 142hr regular rhythm, S1S2, no M/R/G  Gastrointestinal: soft, NTND, no masses palpable, BS normal  Extremities: Warm, well perfused, pulses equal and regular, b/l upper and lower extremities, no edema, no clubbing. Capillary refill <2 sec  Neurological: AAOx3, CN Grossly intact  Skin: Normal temperature, warm, dry    MEDICATIONS  (STANDING):  chlordiazePOXIDE 50 milliGRAM(s) Oral every 8 hours  dextrose 5% + lactated ringers. 1000 milliLiter(s) (150 mL/Hr) IV Continuous <Continuous>  enoxaparin Injectable 40 milliGRAM(s) SubCutaneous every 24 hours  folic acid 1 milliGRAM(s) Oral daily  multivitamin 1 Tablet(s) Oral daily  thiamine 100 milliGRAM(s) Oral every 24 hours    MEDICATIONS  (PRN):  LORazepam   Injectable 2 milliGRAM(s) IV Push every 4 hours PRN CIWA >8      Allergies    No Known Allergies    Intolerances        LABS:                        14.3   5.08  )-----------( 223      ( 2020 07:59 )             42.1     07-06    135  |  96  |  8   ----------------------------<  80  4.2   |  21<L>  |  0.67    Ca    8.3<L>      2020 12:02  Phos  3.3     07-06  Mg     1.9     07-06    TPro  7.9  /  Alb  3.8  /  TBili  0.8  /  DBili  x   /  AST  56<H>  /  ALT  24  /  AlkPhos  103  07-06    PT/INR - ( 2020 07:59 )   PT: 10.7 sec;   INR: 0.89          PTT - ( 2020 07:59 )  PTT:29.0 sec  Urinalysis Basic - ( 2020 11:21 )    Color: Yellow / Appearance: Clear / S.025 / pH: x  Gluc: x / Ketone: Trace mg/dL  / Bili: Negative / Urobili: 0.2 E.U./dL   Blood: x / Protein: 100 mg/dL / Nitrite: NEGATIVE   Leuk Esterase: NEGATIVE / RBC: < 5 /HPF / WBC 5-10 /HPF   Sq Epi: x / Non Sq Epi: 0-5 /HPF / Bacteria: Present /HPF        RADIOLOGY & ADDITIONAL TESTS:  Reviewed

## 2020-07-07 NOTE — PROGRESS NOTE ADULT - ASSESSMENT
54M undomiciled, with PMH EtOH abuse (h/o withdrawal in the past, no DTs, intubations), VISHNU neoplasm (currently getting chemo at [facility]), presents with chest discomfort, found to have alcohol withdrawal c/b new onset atrial fibrillation w/ RVR.

## 2020-07-07 NOTE — CHART NOTE - NSCHARTNOTEFT_GEN_A_CORE
Today in the afternoon, before going to radioablation EP procedure patient stated that he would like to leave AMA without having the procedure done. Writer had a 30-min-long conversation with Mr Alcala. He was informed that if his condition does not improve, he may eventually suffer from severe injury, mostly to his heart, which may finally lead to death. Mr Alcala voiced understanding, but he decided to leave the hospital despite that. Mr Alcala was informed that he may return to ED any time his symptoms worsen.

## 2020-07-07 NOTE — PROGRESS NOTE ADULT - PROBLEM SELECTOR PLAN 3
- AG Currently 18 (24 on admission) with CO2 21 (19 on admission). Likely 2/2 alcoholic ketoacidosis. Alcohol level 68. UA with trace ketones   - c/w D5 /hr for now   -Lactate 1.7 - AG 7/6 18 (24 on admission) with CO2 21 (19 on admission). Likely 2/2 alcoholic ketoacidosis. Alcohol level 68. UA with trace ketones   - c/w D5 /hr for now   -7/6-Lactate 1.7  -f/u- BMP on 7/7

## 2020-07-07 NOTE — PROGRESS NOTE ADULT - PROBLEM SELECTOR PLAN 2
Pt with new onset A-fib, alcohol likely precipitating factor, no prior history. CT PE negative for definite PE. S/p Diltiazem 20mg IV X2 and 60mg po X1 without significant improvement. Lopressor 5mg IV x3 improved his status. Currently hemodynamically stable. CXR without pulmonary vascular congestion, trops negative x1. CHADsVASC 0   - may need a 4th Lopressor 5mg IV x1 and re-evaluate  - f/u TTE  - f/u TSH, free T4.   - hold off on anticoagulation for now Pt with new onset A-fib, alcohol likely precipitating factor, no prior history. CT PE negative for definite PE. S/p Diltiazem 20mg IV X2 and 60mg po X1 without significant improvement. Lopressor 5mg IV x3 improved his status. Currently hemodynamically stable. CXR without pulmonary vascular congestion, trops negative x1. CHADsVASC 0   - may need a 4th Lopressor 5mg IV x1 and re-evaluate  - f/u TTE  - hold off on anticoagulation for now  - dig stopped as rate converted to Atrial tachycardia/flutter  pt started on Lopressor 10mg IVP q6 Pt with new onset A-fib, alcohol likely precipitating factor, no prior history. CT PE negative for definite PE. S/p Diltiazem 20mg IV X2 and 60mg po X1 without significant improvement. Lopressor 5mg IV x3 improved his status. Currently hemodynamically stable. CXR without pulmonary vascular congestion, trops negative x1. CHADsVASC 0   - may need a 4th Lopressor 5mg IV x1 and re-evaluate  - f/u TTE  - hold off on anticoagulation for now  - dig stopped as rhythm converted to Atrial tachycardia vs. flutter  pt started on Lopressor 10mg IVP q6  -EP consult

## 2020-07-07 NOTE — PROGRESS NOTE ADULT - ATTENDING COMMENTS
Patient now wishes to sign out AMA. He has been told that this heart rhythm could become life threatening and evinces understanding but insists on leaving.

## 2020-07-07 NOTE — PROGRESS NOTE ADULT - PROBLEM SELECTOR PLAN 7
Plan  F: dextrose 5% + lactated ringers 150mL/hr  E: replete K<4, Mg<2  N: pt has been experiencing nausea w/ CIWA<8   - was given single IV 4mg zofran () w symptom relief

## 2020-07-07 NOTE — CONSULT NOTE ADULT - ASSESSMENT
54M undomiciled, with PMH EtOH abuse (h/o withdrawal in the past, no DTs, intubations), VISHNU neoplasm (currently getting chemo at [facility]), presents with chest discomfort, found to have alcohol withdrawal. EP consulted for Afib with RVR/Aflutter. Patient with typical Aflutter.     Aflutter: Typical flutter seen on tele and EKG. Tele reveals possible Wenkebach, which caused irregularity. Aflutter typically difficult to rate control with AV casie blockade. Primary team attempted BB, CCB and Dig without effect. [id number]. Per patient history.   -Keep pt NPO.   -Since <48 hours of Aflutter, no need for MARIA D to rule out MARY KAY/LA thrombus.   -Discussed with patient possibility of DCCV vs Ablation and appears to be agreeable.   -Would need at least 4 weeks of anticoagulation post procedure.   -Check TTE.   -No need to continue with Dig.     To be discussed with attending and primary team.

## 2020-07-07 NOTE — DISCHARGE NOTE PROVIDER - CARE PROVIDER_API CALL
Popeye Verdugo  CARDIOVASCULAR DISEASE  [address] [place] [address]  Phone: [phone]  Fax: [phone]  Follow Up Time:

## 2020-07-07 NOTE — PROGRESS NOTE ADULT - PROBLEM SELECTOR PLAN 5
Seen on previous CT, size reduced on current CT. Pt reported getting chemotherapy at [facility], but unable to recall doctor or diagnosis.

## 2020-07-07 NOTE — CONSULT NOTE ADULT - SUBJECTIVE AND OBJECTIVE BOX
HPI:  54M undomiciled, with PMH EtOH abuse (with history of withdrawal but no history of DTs, seizures, intubations), poor historian, lung cancer on chemotherapy (at [facility], last done 1 month ago but stopped going), active smoker (a few cigarettes daily), presenting to the ED with complaints of chest discomfort that started this morning. Describes the chest discomfort as substernal, sharp, intermittent, without any radiation, no alleviating or exacerbating factors. No history of CAD or previous cardiac workup.  Per ED chart, he had reported not feeling well with complaints of mild cough, shortness of breath and nausea. Currently residing at a shelter. Patient poor historian. He was not forthcoming with information, but did notify he had been drinking 2-3 beers daily, last drink yesterday. In the ED, patient acutely became diaphoretic, tachycardic and tremulous with HR 160s. EKG performed at the time which revealed AFib w/ RVR. ICU consulted for AFib RVR in the setting of alcohol withdrawal. Pt denies history of atrial fibrillation or ever being on anticoagulants. Of note, patient has been seen in the ED several times, with the last time back in 2019 for substernal chest pain/Shortness of breath and alcohol withdrawal. However patient eloped the next day. Current CIWA 14.    Consult for Afib/Aflutter with RVR. Patient has documented Sinus tachycardia on admission. Per documentation, pt found to be in AF with RVR in ED while CIWA elevated. Patient denies any cardiac history, including CAD, arrhythmia, heart failure. Patient complaining of some CP and when asked about palpitations, patient says he sometimes does feel them but not always. Some associated lightheadedness and dizziness.       PAST MEDICAL & SURGICAL HISTORY:  Alcoholism /alcohol abuse  No significant past surgical history      REVIEW OF SYSTEMS: As above    MEDICATIONS  (STANDING):  chlordiazePOXIDE 50 milliGRAM(s) Oral every 8 hours  dextrose 5% + lactated ringers. 1000 milliLiter(s) (150 mL/Hr) IV Continuous <Continuous>  enoxaparin Injectable 40 milliGRAM(s) SubCutaneous every 24 hours  folic acid 1 milliGRAM(s) Oral daily  gabapentin 100 milliGRAM(s) Oral three times a day  metoprolol tartrate Injectable 10 milliGRAM(s) IV Push every 6 hours  multivitamin 1 Tablet(s) Oral daily  ondansetron Injectable 4 milliGRAM(s) IV Push once  thiamine 100 milliGRAM(s) Oral every 24 hours    MEDICATIONS  (PRN):  LORazepam   Injectable 2 milliGRAM(s) IV Push every 4 hours PRN CIWA >8      Allergies    No Known Allergies    Intolerances        SOCIAL HISTORY: Smokes 3 cigarettes/day for 6 years. Had been about 1 ppd for some years. Drinks 3 beers per day, last drink yesterday. Denies other drug use.     FAMILY HISTORY:  No pertinent family history in first degree relatives: cad      Vital Signs Last 24 Hrs  T(C): 36.3 (2020 08:42), Max: 37.6 (2020 05:00)  T(F): 97.4 (2020 08:42), Max: 99.7 (2020 05:00)  HR: 147 (2020 10:00) (115 - 154)  BP: 95/62 (2020 10:00) (95/62 - 152/80)  BP(mean): 73 (2020 10:00) (73 - 90)  RR: 20 (2020 10:00) (16 - 20)  SpO2: 96% (2020 10:00) (91% - 99%)    PHYSICAL EXAM:  Constitutional: WDWN, NAD  HEENT: PERRL, EOMI, sclera non-icteric, neck supple, trachea midline, no masses, no JVD, MMM, good dentition  Respiratory: CTA b/l, good air entry b/l, no wheezing, no rhonchi, no rales, without accessory muscle use and no intercostal retractions  Cardiovascular: Tachycardic and regular. S1S2, no M/R/G  Gastrointestinal: soft, NTND, no masses palpable, BS normal  Extremities: Warm, well perfused, pulses equal and regular, b/l upper and lower extremities, no edema, no clubbing. Capillary refill <2 sec  Neurological: AAOx3, CN Grossly intact  Skin: Normal temperature, warm, dry        LABS:                        14.3   5.08  )-----------( 223      ( 2020 07:59 )             42.1     07-06    135  |  96  |  8   ----------------------------<  80  4.2   |  21<L>  |  0.67    Ca    8.3<L>      2020 12:02  Phos  3.3     07-06  Mg     1.9     07-06    TPro  7.9  /  Alb  3.8  /  TBili  0.8  /  DBili  x   /  AST  56<H>  /  ALT  24  /  AlkPhos  103  07-06    PT/INR - ( 2020 07:59 )   PT: 10.7 sec;   INR: 0.89          PTT - ( 2020 07:59 )  PTT:29.0 sec  Urinalysis Basic - ( 2020 11:21 )    Color: Yellow / Appearance: Clear / S.025 / pH: x  Gluc: x / Ketone: Trace mg/dL  / Bili: Negative / Urobili: 0.2 E.U./dL   Blood: x / Protein: 100 mg/dL / Nitrite: NEGATIVE   Leuk Esterase: NEGATIVE / RBC: < 5 /HPF / WBC 5-10 /HPF   Sq Epi: x / Non Sq Epi: 0-5 /HPF / Bacteria: Present /HPF     EKG: Presenting Sinus Tachy. Nonspecific ST/TW changes  : Aflutter with 2:1 block.     Tele: Aflutter with variable conduction. -150s.   -Echo: Pending.

## 2020-07-07 NOTE — DISCHARGE NOTE PROVIDER - NSDCCPCAREPLAN_GEN_ALL_CORE_FT
PRINCIPAL DISCHARGE DIAGNOSIS  Diagnosis: Alcohol withdrawal  Assessment and Plan of Treatment: you were withdrawing from alcohol. We treateded you with chlordiazopoxide 50mg. you were not experiencing symptoms of alcohol withdrawl 07/07/20 when you left against medical adivce      SECONDARY DISCHARGE DIAGNOSES  Diagnosis: Atrial flutter  Assessment and Plan of Treatment: your heart went into atrial flutter. this is rapid beating of the hear originating in the top right corner of your heart. it was beating as fast as 172bpm. this is an extremeley dangerous condition that is life threatening. you were made aware of the consequences of not treating it. you decided to leave against the advice of multiple physcians who all wanted to help you.
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
## 2020-07-09 DIAGNOSIS — R31.9 HEMATURIA, UNSPECIFIED: ICD-10-CM

## 2020-07-09 DIAGNOSIS — R63.8 OTHER SYMPTOMS AND SIGNS CONCERNING FOOD AND FLUID INTAKE: ICD-10-CM

## 2020-07-09 DIAGNOSIS — I48.3 TYPICAL ATRIAL FLUTTER: ICD-10-CM

## 2020-07-09 DIAGNOSIS — I10 ESSENTIAL (PRIMARY) HYPERTENSION: ICD-10-CM

## 2020-07-09 DIAGNOSIS — R07.9 CHEST PAIN, UNSPECIFIED: ICD-10-CM

## 2020-07-09 DIAGNOSIS — F17.210 NICOTINE DEPENDENCE, CIGARETTES, UNCOMPLICATED: ICD-10-CM

## 2020-07-09 DIAGNOSIS — C34.12 MALIGNANT NEOPLASM OF UPPER LOBE, LEFT BRONCHUS OR LUNG: ICD-10-CM

## 2020-07-09 DIAGNOSIS — I48.91 UNSPECIFIED ATRIAL FIBRILLATION: ICD-10-CM

## 2020-07-09 DIAGNOSIS — F10.239 ALCOHOL DEPENDENCE WITH WITHDRAWAL, UNSPECIFIED: ICD-10-CM

## 2020-07-09 DIAGNOSIS — E87.2 ACIDOSIS: ICD-10-CM

## 2020-07-09 DIAGNOSIS — R11.0 NAUSEA: ICD-10-CM

## 2020-08-13 ENCOUNTER — EMERGENCY (EMERGENCY)
Facility: HOSPITAL | Age: 55
LOS: 1 days | Discharge: HOME | End: 2020-08-13
Attending: EMERGENCY MEDICINE | Admitting: EMERGENCY MEDICINE
Payer: MEDICAID

## 2020-08-13 VITALS
RESPIRATION RATE: 18 BRPM | DIASTOLIC BLOOD PRESSURE: 80 MMHG | OXYGEN SATURATION: 99 % | SYSTOLIC BLOOD PRESSURE: 132 MMHG | TEMPERATURE: 98 F | HEART RATE: 90 BPM

## 2020-08-13 VITALS
SYSTOLIC BLOOD PRESSURE: 117 MMHG | DIASTOLIC BLOOD PRESSURE: 76 MMHG | HEART RATE: 119 BPM | OXYGEN SATURATION: 99 % | RESPIRATION RATE: 18 BRPM | WEIGHT: 169.98 LBS | TEMPERATURE: 98 F | HEIGHT: 72 IN

## 2020-08-13 LAB
ALBUMIN SERPL ELPH-MCNC: 3.4 G/DL — SIGNIFICANT CHANGE UP (ref 3.3–5)
ALP SERPL-CCNC: 109 U/L — SIGNIFICANT CHANGE UP (ref 40–120)
ALT FLD-CCNC: 18 U/L — SIGNIFICANT CHANGE UP (ref 10–45)
ANION GAP SERPL CALC-SCNC: 13 MMOL/L — SIGNIFICANT CHANGE UP (ref 5–17)
APAP SERPL-MCNC: <5 UG/ML — LOW (ref 10–30)
APTT BLD: 28.3 SEC — SIGNIFICANT CHANGE UP (ref 27.5–35.5)
AST SERPL-CCNC: 36 U/L — SIGNIFICANT CHANGE UP (ref 10–40)
BASOPHILS # BLD AUTO: 0.04 K/UL — SIGNIFICANT CHANGE UP (ref 0–0.2)
BASOPHILS NFR BLD AUTO: 0.6 % — SIGNIFICANT CHANGE UP (ref 0–2)
BILIRUB SERPL-MCNC: 0.4 MG/DL — SIGNIFICANT CHANGE UP (ref 0.2–1.2)
BUN SERPL-MCNC: 9 MG/DL — SIGNIFICANT CHANGE UP (ref 7–23)
CALCIUM SERPL-MCNC: 8.7 MG/DL — SIGNIFICANT CHANGE UP (ref 8.4–10.5)
CHLORIDE SERPL-SCNC: 101 MMOL/L — SIGNIFICANT CHANGE UP (ref 96–108)
CO2 SERPL-SCNC: 21 MMOL/L — LOW (ref 22–31)
CREAT SERPL-MCNC: 0.78 MG/DL — SIGNIFICANT CHANGE UP (ref 0.5–1.3)
EOSINOPHIL # BLD AUTO: 0.19 K/UL — SIGNIFICANT CHANGE UP (ref 0–0.5)
EOSINOPHIL NFR BLD AUTO: 3 % — SIGNIFICANT CHANGE UP (ref 0–6)
ETHANOL SERPL-MCNC: 84 MG/DL — HIGH (ref 0–10)
GLUCOSE SERPL-MCNC: 111 MG/DL — HIGH (ref 70–99)
HCT VFR BLD CALC: 33 % — LOW (ref 39–50)
HGB BLD-MCNC: 10.9 G/DL — LOW (ref 13–17)
IMM GRANULOCYTES NFR BLD AUTO: 0.8 % — SIGNIFICANT CHANGE UP (ref 0–1.5)
INR BLD: 0.92 — SIGNIFICANT CHANGE UP (ref 0.88–1.16)
LYMPHOCYTES # BLD AUTO: 1.31 K/UL — SIGNIFICANT CHANGE UP (ref 1–3.3)
LYMPHOCYTES # BLD AUTO: 21 % — SIGNIFICANT CHANGE UP (ref 13–44)
MCHC RBC-ENTMCNC: 33 GM/DL — SIGNIFICANT CHANGE UP (ref 32–36)
MCHC RBC-ENTMCNC: 33.4 PG — SIGNIFICANT CHANGE UP (ref 27–34)
MCV RBC AUTO: 101.2 FL — HIGH (ref 80–100)
MONOCYTES # BLD AUTO: 0.61 K/UL — SIGNIFICANT CHANGE UP (ref 0–0.9)
MONOCYTES NFR BLD AUTO: 9.8 % — SIGNIFICANT CHANGE UP (ref 2–14)
NEUTROPHILS # BLD AUTO: 4.04 K/UL — SIGNIFICANT CHANGE UP (ref 1.8–7.4)
NEUTROPHILS NFR BLD AUTO: 64.8 % — SIGNIFICANT CHANGE UP (ref 43–77)
NRBC # BLD: 0 /100 WBCS — SIGNIFICANT CHANGE UP (ref 0–0)
NT-PROBNP SERPL-SCNC: 30 PG/ML — SIGNIFICANT CHANGE UP (ref 0–300)
PLATELET # BLD AUTO: 181 K/UL — SIGNIFICANT CHANGE UP (ref 150–400)
POTASSIUM SERPL-MCNC: 4 MMOL/L — SIGNIFICANT CHANGE UP (ref 3.5–5.3)
POTASSIUM SERPL-SCNC: 4 MMOL/L — SIGNIFICANT CHANGE UP (ref 3.5–5.3)
PROT SERPL-MCNC: 7.1 G/DL — SIGNIFICANT CHANGE UP (ref 6–8.3)
PROTHROM AB SERPL-ACNC: 11.1 SEC — SIGNIFICANT CHANGE UP (ref 10.6–13.6)
RBC # BLD: 3.26 M/UL — LOW (ref 4.2–5.8)
RBC # FLD: 13.5 % — SIGNIFICANT CHANGE UP (ref 10.3–14.5)
SALICYLATES SERPL-MCNC: <0.3 MG/DL — LOW (ref 2.8–20)
SARS-COV-2 RNA SPEC QL NAA+PROBE: SIGNIFICANT CHANGE UP
SODIUM SERPL-SCNC: 135 MMOL/L — SIGNIFICANT CHANGE UP (ref 135–145)
TROPONIN T SERPL-MCNC: <0.01 NG/ML — SIGNIFICANT CHANGE UP (ref 0–0.01)
WBC # BLD: 6.24 K/UL — SIGNIFICANT CHANGE UP (ref 3.8–10.5)
WBC # FLD AUTO: 6.24 K/UL — SIGNIFICANT CHANGE UP (ref 3.8–10.5)

## 2020-08-13 PROCEDURE — 85025 COMPLETE CBC W/AUTO DIFF WBC: CPT

## 2020-08-13 PROCEDURE — 85730 THROMBOPLASTIN TIME PARTIAL: CPT

## 2020-08-13 PROCEDURE — 96375 TX/PRO/DX INJ NEW DRUG ADDON: CPT

## 2020-08-13 PROCEDURE — 85610 PROTHROMBIN TIME: CPT

## 2020-08-13 PROCEDURE — 96374 THER/PROPH/DIAG INJ IV PUSH: CPT

## 2020-08-13 PROCEDURE — 80053 COMPREHEN METABOLIC PANEL: CPT

## 2020-08-13 PROCEDURE — 99285 EMERGENCY DEPT VISIT HI MDM: CPT

## 2020-08-13 PROCEDURE — 84484 ASSAY OF TROPONIN QUANT: CPT

## 2020-08-13 PROCEDURE — 83880 ASSAY OF NATRIURETIC PEPTIDE: CPT

## 2020-08-13 PROCEDURE — 87635 SARS-COV-2 COVID-19 AMP PRB: CPT

## 2020-08-13 PROCEDURE — 80307 DRUG TEST PRSMV CHEM ANLYZR: CPT

## 2020-08-13 PROCEDURE — 71045 X-RAY EXAM CHEST 1 VIEW: CPT

## 2020-08-13 PROCEDURE — 71045 X-RAY EXAM CHEST 1 VIEW: CPT | Mod: 26

## 2020-08-13 PROCEDURE — 96361 HYDRATE IV INFUSION ADD-ON: CPT

## 2020-08-13 PROCEDURE — 99285 EMERGENCY DEPT VISIT HI MDM: CPT | Mod: 25

## 2020-08-13 PROCEDURE — 36415 COLL VENOUS BLD VENIPUNCTURE: CPT

## 2020-08-13 RX ORDER — ONDANSETRON 8 MG/1
4 TABLET, FILM COATED ORAL ONCE
Refills: 0 | Status: COMPLETED | OUTPATIENT
Start: 2020-08-13 | End: 2020-08-13

## 2020-08-13 RX ORDER — FAMOTIDINE 10 MG/ML
20 INJECTION INTRAVENOUS ONCE
Refills: 0 | Status: COMPLETED | OUTPATIENT
Start: 2020-08-13 | End: 2020-08-13

## 2020-08-13 RX ORDER — SODIUM CHLORIDE 9 MG/ML
1000 INJECTION INTRAMUSCULAR; INTRAVENOUS; SUBCUTANEOUS ONCE
Refills: 0 | Status: COMPLETED | OUTPATIENT
Start: 2020-08-13 | End: 2020-08-13

## 2020-08-13 RX ADMIN — SODIUM CHLORIDE 1000 MILLILITER(S): 9 INJECTION INTRAMUSCULAR; INTRAVENOUS; SUBCUTANEOUS at 20:44

## 2020-08-13 RX ADMIN — ONDANSETRON 4 MILLIGRAM(S): 8 TABLET, FILM COATED ORAL at 20:44

## 2020-08-13 RX ADMIN — SODIUM CHLORIDE 1000 MILLILITER(S): 9 INJECTION INTRAMUSCULAR; INTRAVENOUS; SUBCUTANEOUS at 21:45

## 2020-08-13 RX ADMIN — FAMOTIDINE 20 MILLIGRAM(S): 10 INJECTION INTRAVENOUS at 20:45

## 2020-08-13 RX ADMIN — Medication 50 MILLIGRAM(S): at 20:45

## 2020-08-13 NOTE — ED PROVIDER NOTE - PHYSICAL EXAMINATION
no LE edema, normal equal distal pulses, steady unassisted gait.   very minimal b/l UE tremors w/ hand raise.   superficial abrasion intra-oral aspect of lower lip  No trismus. Jaw FROM. No obvious facial swelling. Normal voice. No stridor/drooling. No bony ttp. No bleeding.   L buttock: No crepitus, firmness, induration, fluctuance. Skin is normal temp. No erythema/warmth. No obvious skin breaks.

## 2020-08-13 NOTE — ED PROVIDER NOTE - NSFOLLOWUPINSTRUCTIONS_ED_ALL_ED_FT
FOLLOW UP WITH HEMATOLOGIST/ONCOLOGIST (cancer and blood doctor)!!!   You have lung cancer (based on prior charts).  You are also "anemic" (have a declining blood count) found on your labs today.   Can call to schedule appointment: [phone] [address] [address]    Can take tylenol 650mg every 6hrs as needed for pain.  Follow up with primary doctor within 1-2 days.  Follow up with cardiologist within 1-2 days. Can call [phone] ([facility]) to schedule appointment.   Return to ER for persistent fever/vomit, uncontrolled pain, worsening breathing, worsening lightheaded, focal weakness/numbness, worsening shaking.   Alcohol is harmful to your health.    Alcohol Abuse    Alcohol intoxication occurs when the amount of alcohol that a person has consumed impairs his or her ability to mentally and physically function. Chronic alcohol consumption can also lead to a variety of health issues including neurological disease, stomach disease, heart disease, liver disease, etc. Do not drive after drinking alcohol. Drinking enough alcohol to end up in an Emergency Room suggests you may have an alcohol abuse problem. Seek help at a drug addiction center.    SEEK IMMEDIATE MEDICAL CARE IF YOU HAVE ANY OF THE FOLLOWING SYMPTOMS: seizures, vomiting blood, blood in your stool, lightheadedness/dizziness, or becoming shaky to tremulous when you stop drinking.     Nonspecific Chest Pain  Chest pain can be caused by many different conditions. It can be caused by a condition that is life-threatening and requires treatment right away. It can also be caused by something that is not life-threatening. If you have chest pain, it can be hard to know the difference, so it is important to get help right away to make sure that you do not have a serious condition.    Some life-threatening causes of chest pain include:  Heart attack.  A tear in the body's main blood vessel (aortic dissection).  Inflammation around your heart (pericarditis).  A problem in the lungs, such as a blood clot (pulmonary embolism) or a collapsed lung (pneumothorax).    Some non life-threatening causes of chest pain include:  Heartburn.  Anxiety or stress.  Damage to the bones, muscles, and cartilage that make up your chest wall.  Pneumonia or bronchitis.  Shingles infection (varicella-zoster virus).    Chest pain can feel like:  Pain or discomfort on the surface of your chest or deep in your chest.  Crushing, pressure, aching, or squeezing pain.  Burning or tingling.  Dull or sharp pain that is worse when you move, cough, or take a deep breath.  Pain or discomfort that is also felt in your back, neck, jaw, shoulder, or arm, or pain that spreads to any of these areas.  Your chest pain may come and go. It may also be constant. Your health care provider will do lab tests and other studies to find the cause of your pain. Treatment will depend on the cause of your chest pain.    Follow these instructions at home:  Pay attention to any changes in your symptoms. Tell your health care provider about them or any new symptoms. Follow these instructions from your health care provider.    Medicines   Take over-the-counter and prescription medicines only as told by your health care provider.  If you were prescribed an antibiotic, take it as told by your health care provider. Do not stop taking the antibiotic even if you start to feel better.    Lifestyle    Rest as directed by your health care provider.  Do not use any products that contain nicotine or tobacco, such as cigarettes and e-cigarettes. If you need help quitting, ask your health care provider.  Do not drink alcohol.  Make healthy lifestyle choices as recommended. These may include:  Getting regular exercise. Ask your health care provider to suggest some activities that are safe for you.  Eating a heart-healthy diet. This includes plenty of fresh fruits and vegetables, whole grains, low-fat (lean) protein, and low-fat dairy products. A dietitian can help you find healthy eating options.  Maintaining a healthy weight.  Managing any other health conditions you have, such as hypertension or diabetes.  Reducing stress, such as with yoga or relaxation techniques.    General instructions   Avoid any activities that bring on chest pain.  Keep all follow-up visits as told by your health care provider. This is important. This includes visits for any further testing if your chest pain does not go away.    Contact a health care provider if:  Your chest pain does not go away.  You feel depressed.  You have a fever.    Get help right away if:  Your chest pain gets worse.  You have a cough that gets worse, or you cough up blood.  You have severe pain in your abdomen.  You faint.  You have sudden, unexplained chest discomfort.  You have sudden, unexplained discomfort in your arms, back, neck, or jaw.  You have shortness of breath at any time.  You suddenly start to sweat, or your skin gets clammy.  You feel nausea or you vomit.  You suddenly feel lightheaded or dizzy.  You have severe weakness, or unexplained weakness or fatigue.  Your heart begins to beat quickly, or it feels like it is skipping beats.     Anemia    Anemia is a condition in which the concentration of red blood cells or hemoglobin in the blood is below normal. Hemoglobin is a substance in red blood cells that carries oxygen to the tissues of the body. Anemia results in not enough oxygen reaching these tissues which can cause symptoms such as weakness, dizziness/lightheadedness, shortness of breath, chest pain, paleness, or nausea. The cause of your anemia may or may not be determined immediately. If your hemoglobin was dangerously low, you may have received a blood transfusion. Usually reactions to transfusions occur immediately but monitor yourself for any fevers, rash, or shortness of breath.    SEEK IMMEDIATE MEDICAL CARE IF YOU HAVE ANY OF THE FOLLOWING SYMPTOMS: extreme weakness/chest pain/shortness of breath, black or bloody stools, vomiting blood, fainting, fever, or any signs of dehydration.

## 2020-08-13 NOTE — ED ADULT NURSE NOTE - NSIMPLEMENTINTERV_GEN_ALL_ED
Implemented All Universal Safety Interventions:  [place] to call system. Call bell, personal items and telephone within reach. Instruct patient to call for assistance. Room bathroom lighting operational. Non-slip footwear when patient is off stretcher. Physically safe environment: no spills, clutter or unnecessary equipment. Stretcher in lowest position, wheels locked, appropriate side rails in place.

## 2020-08-13 NOTE — ED PROVIDER NOTE - CARE PLAN
Principal Discharge DX:	Chest pain Principal Discharge DX:	Chest pain  Secondary Diagnosis:	Alcohol intoxication  Secondary Diagnosis:	Anemia

## 2020-08-13 NOTE — ED PROVIDER NOTE - CLINICAL SUMMARY MEDICAL DECISION MAKING FREE TEXT BOX
55M PMH etoh abuse, lung ca (stopped going to chemo), smoker, p/w cp. Pt admitted ~1mo ago for afib RVR in setting of etoh withdrawal, eventually AMA'd and refused radioablation. Has not taken meds/followed up w/ anyone since then. Presents now w/ vague sternal cp since this morning. Admits to etoh ~1hr ago. NBNB NV x2. Also w/ L buttock pain for 1-2d. Last fall ~3d ago. No other systemic symptoms. Mild tachycardia, other vitals wnl. Exam as above. Well appearing.  ddx: Likely aspect of etoh intxo, likely GERD/gastritis/PUD vs. less likely ACS.   labs, IVF/symptom control, CXR, COVID, reassess.

## 2020-08-13 NOTE — ED ADULT NURSE NOTE - OBJECTIVE STATEMENT
Patient A&Ox3, respirations even and unlabored, patient arrives with complaints of chest pain since this morning, reports last alcoholic drink "one hour ago", denies daily use of alcohol, reports history of alcohol withdrawal in the past. Patient reports SOB on exertion. Patient reports auditory hallucination, states "I hear voices", denies SI/HI, denies drug use. Hx lung CA.

## 2020-08-13 NOTE — ED PROVIDER NOTE - OBJECTIVE STATEMENT
55M PMH etoh abuse, lung ca (stopped going to chemo), smoker, p/w cp. Pt admitted ~1mo ago for afib RVR in setting of etoh withdrawal, eventually AMA'd and refused radioablation. Has not taken meds/followed up w/ anyone since then. Presents now w/ vague sternal cp since this morning. Admits to etoh ~1hr ago. NBNB NV x2. Also w/ L buttock pain for 1-2d. Last fall ~3d ago. Denies any falls today, HA, SOB, diarrhea, abd pain, urinary complaints, black/bloody stool, focal weakness/numbness, vision changes.   Echo w. small pericardial effusion.

## 2020-08-13 NOTE — ED PROVIDER NOTE - PATIENT PORTAL LINK FT
You can access the FollowMyHealth Patient Portal offered by [facility] by registering at the following website: [email or web address]. By joining [employer]’s FollowMyHealth portal, you will also be able to view your health information using other applications (apps) compatible with our system.

## 2020-08-13 NOTE — ED ADULT NURSE REASSESSMENT NOTE - NS ED NURSE REASSESS COMMENT FT1
pt able to tolerate po's. pt ambulates appropriately without difficulty. pt agreeable to dc at this time. md aware.

## 2020-08-13 NOTE — ED PROVIDER NOTE - PROGRESS NOTE DETAILS
[place]: hgb 14.3-->10.9 since ~1mo ago. Pt denies black/bloody stool, lightheaded, SOB. etoh 84. Other labs grossly wnl. Vitals improved. Observed in ED for several hrs. Pt awake and alert. Asymptomatic. Denies SI/HI. Steady unassisted gait. Tolerating PO. Clinically sober and not withdrawing, given copy of results, comfortable for dc, outpt PMD f/u.   Discussed importance of heme/onc f/u.

## 2020-08-14 PROBLEM — C34.90 MALIGNANT NEOPLASM OF UNSPECIFIED PART OF UNSPECIFIED BRONCHUS OR LUNG: Chronic | Status: ACTIVE | Noted: 2020-07-10

## 2020-08-17 DIAGNOSIS — R07.89 OTHER CHEST PAIN: ICD-10-CM

## 2020-08-17 DIAGNOSIS — F10.129 ALCOHOL ABUSE WITH INTOXICATION, UNSPECIFIED: ICD-10-CM

## 2020-08-17 DIAGNOSIS — Z20.828 CONTACT WITH AND (SUSPECTED) EXPOSURE TO OTHER VIRAL COMMUNICABLE DISEASES: ICD-10-CM

## 2020-08-17 DIAGNOSIS — R07.9 CHEST PAIN, UNSPECIFIED: ICD-10-CM

## 2020-08-17 DIAGNOSIS — D64.9 ANEMIA, UNSPECIFIED: ICD-10-CM

## 2020-08-17 DIAGNOSIS — F17.200 NICOTINE DEPENDENCE, UNSPECIFIED, UNCOMPLICATED: ICD-10-CM

## 2020-09-03 ENCOUNTER — INPATIENT (INPATIENT)
Facility: HOSPITAL | Age: 55
LOS: 1 days | Discharge: HOME | End: 2020-09-05
Attending: HOSPITALIST | Admitting: HOSPITALIST
Payer: MEDICAID

## 2020-09-03 VITALS
SYSTOLIC BLOOD PRESSURE: 133 MMHG | TEMPERATURE: 98 F | DIASTOLIC BLOOD PRESSURE: 76 MMHG | RESPIRATION RATE: 18 BRPM | OXYGEN SATURATION: 99 % | HEART RATE: 108 BPM

## 2020-09-03 LAB
ALBUMIN SERPL ELPH-MCNC: 4.2 G/DL — SIGNIFICANT CHANGE UP (ref 3.3–5)
ALP SERPL-CCNC: 110 U/L — SIGNIFICANT CHANGE UP (ref 40–120)
ALT FLD-CCNC: 72 U/L — HIGH (ref 4–41)
ANION GAP SERPL CALC-SCNC: 17 MMO/L — HIGH (ref 7–14)
APAP SERPL-MCNC: < 15 UG/ML — LOW (ref 15–25)
AST SERPL-CCNC: 73 U/L — HIGH (ref 4–40)
BASE EXCESS BLDV CALC-SCNC: 4 MMOL/L — SIGNIFICANT CHANGE UP
BASOPHILS # BLD AUTO: 0.06 K/UL — SIGNIFICANT CHANGE UP (ref 0–0.2)
BASOPHILS NFR BLD AUTO: 0.9 % — SIGNIFICANT CHANGE UP (ref 0–2)
BILIRUB SERPL-MCNC: 0.4 MG/DL — SIGNIFICANT CHANGE UP (ref 0.2–1.2)
BLOOD GAS VENOUS - CREATININE: SIGNIFICANT CHANGE UP MG/DL (ref 0.5–1.3)
BUN SERPL-MCNC: 6 MG/DL — LOW (ref 7–23)
CALCIUM SERPL-MCNC: 8.7 MG/DL — SIGNIFICANT CHANGE UP (ref 8.4–10.5)
CHLORIDE BLDV-SCNC: 102 MMOL/L — SIGNIFICANT CHANGE UP (ref 96–108)
CHLORIDE SERPL-SCNC: 96 MMOL/L — LOW (ref 98–107)
CO2 SERPL-SCNC: 25 MMOL/L — SIGNIFICANT CHANGE UP (ref 22–31)
CREAT SERPL-MCNC: 0.76 MG/DL — SIGNIFICANT CHANGE UP (ref 0.5–1.3)
EOSINOPHIL # BLD AUTO: 0.06 K/UL — SIGNIFICANT CHANGE UP (ref 0–0.5)
EOSINOPHIL NFR BLD AUTO: 0.9 % — SIGNIFICANT CHANGE UP (ref 0–6)
ETHANOL BLD-MCNC: 276 MG/DL — HIGH
GAS PNL BLDV: 139 MMOL/L — SIGNIFICANT CHANGE UP (ref 136–146)
GLUCOSE BLDV-MCNC: 92 MG/DL — SIGNIFICANT CHANGE UP (ref 70–99)
GLUCOSE SERPL-MCNC: 90 MG/DL — SIGNIFICANT CHANGE UP (ref 70–99)
HCO3 BLDV-SCNC: 26 MMOL/L — SIGNIFICANT CHANGE UP (ref 20–27)
HCT VFR BLD CALC: 40 % — SIGNIFICANT CHANGE UP (ref 39–50)
HCT VFR BLDV CALC: 38.8 % — LOW (ref 39–51)
HGB BLD-MCNC: 12.8 G/DL — LOW (ref 13–17)
HGB BLDV-MCNC: 12.6 G/DL — LOW (ref 13–17)
IMM GRANULOCYTES NFR BLD AUTO: 0.3 % — SIGNIFICANT CHANGE UP (ref 0–1.5)
LACTATE BLDV-MCNC: 1.9 MMOL/L — SIGNIFICANT CHANGE UP (ref 0.5–2)
LIDOCAIN IGE QN: 82.1 U/L — HIGH (ref 7–60)
LYMPHOCYTES # BLD AUTO: 1.62 K/UL — SIGNIFICANT CHANGE UP (ref 1–3.3)
LYMPHOCYTES # BLD AUTO: 23.4 % — SIGNIFICANT CHANGE UP (ref 13–44)
MCHC RBC-ENTMCNC: 32 % — SIGNIFICANT CHANGE UP (ref 32–36)
MCHC RBC-ENTMCNC: 33.2 PG — SIGNIFICANT CHANGE UP (ref 27–34)
MCV RBC AUTO: 103.9 FL — HIGH (ref 80–100)
MONOCYTES # BLD AUTO: 0.78 K/UL — SIGNIFICANT CHANGE UP (ref 0–0.9)
MONOCYTES NFR BLD AUTO: 11.3 % — SIGNIFICANT CHANGE UP (ref 2–14)
NEUTROPHILS # BLD AUTO: 4.37 K/UL — SIGNIFICANT CHANGE UP (ref 1.8–7.4)
NEUTROPHILS NFR BLD AUTO: 63.2 % — SIGNIFICANT CHANGE UP (ref 43–77)
NRBC # FLD: 0 K/UL — SIGNIFICANT CHANGE UP (ref 0–0)
PCO2 BLDV: 53 MMHG — HIGH (ref 41–51)
PH BLDV: 7.36 PH — SIGNIFICANT CHANGE UP (ref 7.32–7.43)
PLATELET # BLD AUTO: 241 K/UL — SIGNIFICANT CHANGE UP (ref 150–400)
PMV BLD: 8.9 FL — SIGNIFICANT CHANGE UP (ref 7–13)
PO2 BLDV: 29 MMHG — LOW (ref 35–40)
POTASSIUM BLDV-SCNC: 4.1 MMOL/L — SIGNIFICANT CHANGE UP (ref 3.4–4.5)
POTASSIUM SERPL-MCNC: 3.8 MMOL/L — SIGNIFICANT CHANGE UP (ref 3.5–5.3)
POTASSIUM SERPL-SCNC: 3.8 MMOL/L — SIGNIFICANT CHANGE UP (ref 3.5–5.3)
PROT SERPL-MCNC: 8.1 G/DL — SIGNIFICANT CHANGE UP (ref 6–8.3)
RBC # BLD: 3.85 M/UL — LOW (ref 4.2–5.8)
RBC # FLD: 14.5 % — SIGNIFICANT CHANGE UP (ref 10.3–14.5)
SALICYLATES SERPL-MCNC: < 5 MG/DL — LOW (ref 15–30)
SAO2 % BLDV: 45.7 % — LOW (ref 60–85)
SODIUM SERPL-SCNC: 138 MMOL/L — SIGNIFICANT CHANGE UP (ref 135–145)
TROPONIN T, HIGH SENSITIVITY: 27 NG/L — SIGNIFICANT CHANGE UP (ref ?–14)
WBC # BLD: 6.91 K/UL — SIGNIFICANT CHANGE UP (ref 3.8–10.5)
WBC # FLD AUTO: 6.91 K/UL — SIGNIFICANT CHANGE UP (ref 3.8–10.5)

## 2020-09-03 PROCEDURE — 71045 X-RAY EXAM CHEST 1 VIEW: CPT | Mod: 26

## 2020-09-03 PROCEDURE — 99285 EMERGENCY DEPT VISIT HI MDM: CPT

## 2020-09-03 PROCEDURE — 74177 CT ABD & PELVIS W/CONTRAST: CPT | Mod: 26

## 2020-09-03 RX ORDER — MORPHINE SULFATE 50 MG/1
4 CAPSULE, EXTENDED RELEASE ORAL ONCE
Refills: 0 | Status: DISCONTINUED | OUTPATIENT
Start: 2020-09-03 | End: 2020-09-03

## 2020-09-03 RX ORDER — PANTOPRAZOLE SODIUM 20 MG/1
80 TABLET, DELAYED RELEASE ORAL ONCE
Refills: 0 | Status: COMPLETED | OUTPATIENT
Start: 2020-09-03 | End: 2020-09-03

## 2020-09-03 RX ORDER — METOCLOPRAMIDE HCL 10 MG
10 TABLET ORAL ONCE
Refills: 0 | Status: COMPLETED | OUTPATIENT
Start: 2020-09-03 | End: 2020-09-03

## 2020-09-03 RX ORDER — SODIUM CHLORIDE 9 MG/ML
1000 INJECTION, SOLUTION INTRAVENOUS ONCE
Refills: 0 | Status: COMPLETED | OUTPATIENT
Start: 2020-09-03 | End: 2020-09-03

## 2020-09-03 RX ADMIN — MORPHINE SULFATE 4 MILLIGRAM(S): 50 CAPSULE, EXTENDED RELEASE ORAL at 18:11

## 2020-09-03 RX ADMIN — Medication 10 MILLIGRAM(S): at 17:31

## 2020-09-03 RX ADMIN — SODIUM CHLORIDE 1000 MILLILITER(S): 9 INJECTION, SOLUTION INTRAVENOUS at 17:31

## 2020-09-03 RX ADMIN — PANTOPRAZOLE SODIUM 80 MILLIGRAM(S): 20 TABLET, DELAYED RELEASE ORAL at 17:31

## 2020-09-03 RX ADMIN — MORPHINE SULFATE 4 MILLIGRAM(S): 50 CAPSULE, EXTENDED RELEASE ORAL at 17:31

## 2020-09-03 NOTE — ED ADULT NURSE NOTE - CHIEF COMPLAINT QUOTE
Patient brought to ER by EMS from the street after a passerby found him in [facility] [address] intoxicated. Pt vomiting.   Pt coughing up blood.

## 2020-09-03 NOTE — ED PROVIDER NOTE - OBJECTIVE STATEMENT
55M p/w N/V/D 3 of each, bystander called EMS, pt reports is homeless, x 2- days or so.  Does report fever but didn't measure it.  Reports h/o lung CA but is not undergoing treatment.  Does report diffuse abd pain as well.  Pt reports drinks everyday.  Doesnt' take any medicine, denies surgeries, does endorse tobacco, no drugs, NKA.  Diffusely tender but more upper abd pain, will check labs, rx pain meds and nausea meds, check CT a/p, CXR, EKG, trop, blood gas, potentially admission.  Pt does endorse wanting to stop drinking.

## 2020-09-03 NOTE — ED ADULT NURSE NOTE - NSFALLRSKINDICTYPE_ED_ALL_ED
Ferritin level 4,023 reviewed with Dr. Palomino. Orders received to proceed with Desferal treatment as previously ordered.    
Intoxication

## 2020-09-03 NOTE — ED ADULT NURSE NOTE - OBJECTIVE STATEMENT
break coverage RN - pt received in spot 14. when attempting to assess patient patient states "I am not answering these stupid questions".  unable to obtain full assesment/evaluation of patient due to patient non-compliance. pt noted to be covered in brown-colored emesis. respirations even and unlabored. medicated as per EMAR. 20g iv placed to R AC. labs drawn and sent. will continue to monitor.

## 2020-09-03 NOTE — ED PROVIDER NOTE - PROGRESS NOTE DETAILS
[place]: awoke patient, appears tremulous, has tongue fasciculations, unable to ambulate, has a very wide based gait, tba for etoh withdrawal, will administer vitamins, concern for NPH or vitamin deficiency, head ct pending

## 2020-09-03 NOTE — ED PROVIDER NOTE - CLINICAL SUMMARY MEDICAL DECISION MAKING FREE TEXT BOX
55M p/w N/V/D 3 of each, bystander called EMS, pt reports is homeless, x 2- days or so.  Does report fever but didn't measure it.  Reports h/o lung CA but is not undergoing treatment.  Does report diffuse abd pain as well.  Pt reports drinks everyday.  Doesnt' take any medicine, denies surgeries, does endorse tobacco, no drugs, NKA.  Diffusely tender but more upper abd pain, will check labs, rx pain meds and nausea meds, check CT a/p, CXR, EKG, trop, blood gas, potentially admission.  Pt does endorse wanting to stop drinking.   Denies trauma.  Reass uzma for withdrawal.

## 2020-09-03 NOTE — ED ADULT NURSE NOTE - NSIMPLEMENTINTERV_GEN_ALL_ED
Implemented All Fall Risk Interventions:  [place] to call system. Call bell, personal items and telephone within reach. Instruct patient to call for assistance. Room bathroom lighting operational. Non-slip footwear when patient is off stretcher. Physically safe environment: no spills, clutter or unnecessary equipment. Stretcher in lowest position, wheels locked, appropriate side rails in place. Provide visual cue, wrist band, yellow gown, etc. Monitor gait and stability. Monitor for mental status changes and reorient to person, place, and time. Review medications for side effects contributing to fall risk. Reinforce activity limits and safety measures with patient and family.

## 2020-09-03 NOTE — ED PROVIDER NOTE - CARE PLAN
Principal Discharge DX:	Alcohol withdrawal  Secondary Diagnosis:	Ataxia Principal Discharge DX:	Alcohol withdrawal  Secondary Diagnosis:	Ataxia  Secondary Diagnosis:	Generalized abdominal pain

## 2020-09-03 NOTE — ED ADULT NURSE REASSESSMENT NOTE - NS ED NURSE REASSESS COMMENT FT1
Received patient from VERONICA Wolfe, patient received back from CT scan. Patient sleeping at present. Appears comfortable. Bed set in lowest and both side rails up for safety. Will continue to monitor.

## 2020-09-03 NOTE — ED PROVIDER NOTE - PHYSICAL EXAMINATION
VS:  unremarkable except tachycardia    GEN - malaise, hiccoughs and vomiting;   A+O x3 Irritable but redirectable  HEAD - NC/AT     ENT - PEERL, EOMI, mucous membranes  dry , no discharge      NECK: Neck supple, non-tender without lymphadenopathy, no masses, no JVD  PULM - CTA b/l,  symmetric breath sounds  COR -  normal heart sounds    ABD - , ND, upper abd ttp > lower abd ttp, not distended., soft,  BACK - no CVA tenderness, nontender spine     EXTREMS - no edema, no deformity, warm and well perfused    SKIN - no rash    or bruising      NEUROLOGIC - alert, face symmetric, speech fluent, sensation nl, motor no focal deficit.

## 2020-09-03 NOTE — ED ADULT TRIAGE NOTE - CHIEF COMPLAINT QUOTE
Patient brought to ER by EMS from the street after a passerby found him in [facility] [address] intoxicated. Pt vomiting. Patient brought to ER by EMS from the street after a passerby found him in [facility] street intoxicated. Pt vomiting.   Pt coughing up blood.

## 2020-09-03 NOTE — ED PROVIDER NOTE - ATTENDING CONTRIBUTION TO CARE
55M p/w N/V/D 3 of each, bystander called EMS, pt reports is homeless, x 2- days or so.  Does report fever but didn't measure it.  Reports h/o lung CA but is not undergoing treatment.  Does report diffuse abd pain as well.  Pt reports drinks everyday.  Doesnt' take any medicine, denies surgeries, does endorse tobacco, no drugs, NKA.  Diffusely tender but more upper abd pain, will check labs, rx pain meds and nausea meds, check CT a/p, CXR, EKG, trop, blood gas, potentially admission.  Pt does endorse wanting to stop drinking.   Denies trauma.   VS:  unremarkable except tachycardia    GEN - malaise, hiccoughs and vomiting;   A+O x3 Irritable but redirectable  HEAD - NC/AT     ENT - PEERL, EOMI, mucous membranes  dry , no discharge      NECK: Neck supple, non-tender without lymphadenopathy, no masses, no JVD  PULM - CTA b/l,  symmetric breath sounds  COR -  normal heart sounds    ABD - , ND, upper abd ttp > lower abd ttp, not distended., soft,  BACK - no CVA tenderness, nontender spine     EXTREMS - no edema, no deformity, warm and well perfused    SKIN - no rash    or bruising      NEUROLOGIC - alert, face symmetric, speech fluent, sensation nl, motor no focal deficit.

## 2020-09-04 DIAGNOSIS — Z02.9 ENCOUNTER FOR ADMINISTRATIVE EXAMINATIONS, UNSPECIFIED: ICD-10-CM

## 2020-09-04 DIAGNOSIS — F10.230 ALCOHOL DEPENDENCE WITH WITHDRAWAL, UNCOMPLICATED: ICD-10-CM

## 2020-09-04 DIAGNOSIS — C34.90 MALIGNANT NEOPLASM OF UNSPECIFIED PART OF UNSPECIFIED BRONCHUS OR LUNG: ICD-10-CM

## 2020-09-04 DIAGNOSIS — F10.231 ALCOHOL DEPENDENCE WITH WITHDRAWAL DELIRIUM: ICD-10-CM

## 2020-09-04 DIAGNOSIS — R10.84 GENERALIZED ABDOMINAL PAIN: ICD-10-CM

## 2020-09-04 DIAGNOSIS — K76.0 FATTY (CHANGE OF) LIVER, NOT ELSEWHERE CLASSIFIED: ICD-10-CM

## 2020-09-04 DIAGNOSIS — Z29.9 ENCOUNTER FOR PROPHYLACTIC MEASURES, UNSPECIFIED: ICD-10-CM

## 2020-09-04 LAB
ALBUMIN SERPL ELPH-MCNC: 3.6 G/DL — SIGNIFICANT CHANGE UP (ref 3.3–5)
ALP SERPL-CCNC: 99 U/L — SIGNIFICANT CHANGE UP (ref 40–120)
ALT FLD-CCNC: 62 U/L — HIGH (ref 4–41)
ANION GAP SERPL CALC-SCNC: 20 MMO/L — HIGH (ref 7–14)
AST SERPL-CCNC: 81 U/L — HIGH (ref 4–40)
BILIRUB DIRECT SERPL-MCNC: < 0.2 MG/DL — SIGNIFICANT CHANGE UP (ref 0.1–0.2)
BILIRUB SERPL-MCNC: 0.6 MG/DL — SIGNIFICANT CHANGE UP (ref 0.2–1.2)
BUN SERPL-MCNC: 5 MG/DL — LOW (ref 7–23)
CALCIUM SERPL-MCNC: 8.7 MG/DL — SIGNIFICANT CHANGE UP (ref 8.4–10.5)
CHLORIDE SERPL-SCNC: 99 MMOL/L — SIGNIFICANT CHANGE UP (ref 98–107)
CO2 SERPL-SCNC: 17 MMOL/L — LOW (ref 22–31)
CREAT SERPL-MCNC: 0.57 MG/DL — SIGNIFICANT CHANGE UP (ref 0.5–1.3)
GLUCOSE SERPL-MCNC: 64 MG/DL — LOW (ref 70–99)
MAGNESIUM SERPL-MCNC: 1.9 MG/DL — SIGNIFICANT CHANGE UP (ref 1.6–2.6)
PHOSPHATE SERPL-MCNC: 3.1 MG/DL — SIGNIFICANT CHANGE UP (ref 2.5–4.5)
POTASSIUM SERPL-MCNC: 5 MMOL/L — SIGNIFICANT CHANGE UP (ref 3.5–5.3)
POTASSIUM SERPL-SCNC: 5 MMOL/L — SIGNIFICANT CHANGE UP (ref 3.5–5.3)
PROT SERPL-MCNC: 7.3 G/DL — SIGNIFICANT CHANGE UP (ref 6–8.3)
SARS-COV-2 IGG SERPL QL IA: NEGATIVE — SIGNIFICANT CHANGE UP
SARS-COV-2 IGM SERPL IA-ACNC: <3.8 AU/ML — SIGNIFICANT CHANGE UP
SARS-COV-2 RNA SPEC QL NAA+PROBE: SIGNIFICANT CHANGE UP
SODIUM SERPL-SCNC: 136 MMOL/L — SIGNIFICANT CHANGE UP (ref 135–145)

## 2020-09-04 PROCEDURE — 70450 CT HEAD/BRAIN W/O DYE: CPT | Mod: 26

## 2020-09-04 PROCEDURE — 12345: CPT | Mod: NC

## 2020-09-04 PROCEDURE — 99223 1ST HOSP IP/OBS HIGH 75: CPT | Mod: GC

## 2020-09-04 RX ORDER — ENOXAPARIN SODIUM 100 MG/ML
40 INJECTION SUBCUTANEOUS DAILY
Refills: 0 | Status: DISCONTINUED | OUTPATIENT
Start: 2020-09-04 | End: 2020-09-05

## 2020-09-04 RX ORDER — SODIUM CHLORIDE 9 MG/ML
1000 INJECTION INTRAMUSCULAR; INTRAVENOUS; SUBCUTANEOUS ONCE
Refills: 0 | Status: COMPLETED | OUTPATIENT
Start: 2020-09-04 | End: 2020-09-04

## 2020-09-04 RX ORDER — FOLIC ACID 0.8 MG
1 TABLET ORAL ONCE
Refills: 0 | Status: COMPLETED | OUTPATIENT
Start: 2020-09-04 | End: 2020-09-04

## 2020-09-04 RX ORDER — INFLUENZA VIRUS VACCINE 15; 15; 15; 15 UG/.5ML; UG/.5ML; UG/.5ML; UG/.5ML
0.5 SUSPENSION INTRAMUSCULAR ONCE
Refills: 0 | Status: DISCONTINUED | OUTPATIENT
Start: 2020-09-04 | End: 2020-09-05

## 2020-09-04 RX ORDER — THIAMINE MONONITRATE (VIT B1) 100 MG
100 TABLET ORAL ONCE
Refills: 0 | Status: COMPLETED | OUTPATIENT
Start: 2020-09-04 | End: 2020-09-04

## 2020-09-04 RX ORDER — ONDANSETRON 8 MG/1
4 TABLET, FILM COATED ORAL EVERY 6 HOURS
Refills: 0 | Status: DISCONTINUED | OUTPATIENT
Start: 2020-09-04 | End: 2020-09-05

## 2020-09-04 RX ADMIN — Medication 2 MILLIGRAM(S): at 06:51

## 2020-09-04 RX ADMIN — SODIUM CHLORIDE 1000 MILLILITER(S): 9 INJECTION INTRAMUSCULAR; INTRAVENOUS; SUBCUTANEOUS at 03:00

## 2020-09-04 RX ADMIN — Medication 100 MILLIGRAM(S): at 02:59

## 2020-09-04 RX ADMIN — Medication 1 MILLIGRAM(S): at 05:03

## 2020-09-04 RX ADMIN — Medication 2 MILLIGRAM(S): at 14:28

## 2020-09-04 RX ADMIN — ENOXAPARIN SODIUM 40 MILLIGRAM(S): 100 INJECTION SUBCUTANEOUS at 18:45

## 2020-09-04 RX ADMIN — Medication 1 TABLET(S): at 02:59

## 2020-09-04 RX ADMIN — Medication 2 MILLIGRAM(S): at 10:11

## 2020-09-04 RX ADMIN — Medication 50 MILLIGRAM(S): at 02:59

## 2020-09-04 RX ADMIN — ONDANSETRON 4 MILLIGRAM(S): 8 TABLET, FILM COATED ORAL at 20:42

## 2020-09-04 NOTE — H&P ADULT - PROBLEM SELECTOR PLAN 1
Last drink prior to presenting to ED. Hemodynamically stable.   - ativan taper  - monitor  - s/p thiamine

## 2020-09-04 NOTE — H&P ADULT - PROBLEM SELECTOR PLAN 5
Transitions of Care Status:  1.  Name of PCP:none  2.  PCP Contacted on Admission: [ ] Y    [ ] N    3.  PCP contacted at Discharge: [ ] Y    [ ] N    [ ] N/A  4.  Post-Discharge Appointment Date and Location:  5.  Summary of Handoff given to PCP:

## 2020-09-04 NOTE — H&P ADULT - HISTORY OF PRESENT ILLNESS
55 yoM PMH lung cancer, not getting treatment because of insurance presenting with alcohol intoxication that progressed to withdrawal. Pt states he drinks daily, drank right before coming to ED. Refused to answer further questions. As per charge review, pt was experiencing multiple episodes of n/v/d and so bystander called EMS.    ED course:  triage VS: afebrile  /76 RR18 99%RA  Given librium, folic acid, metoclopramide, morphine, multivit, thiamine, pantoprazole, 2L IVF

## 2020-09-04 NOTE — H&P ADULT - NSHPPHYSICALEXAM_GEN_ALL_CORE
VITALS:   T(C): 36.7 (09-04-20 @ 02:29), Max: 36.7 (09-03-20 @ 15:53)  HR: 94 (09-04-20 @ 02:29) (94 - 108)  BP: 125/80 (09-04-20 @ 02:29) (119/70 - 133/76)  RR: 17 (09-04-20 @ 02:29) (17 - 18)  SpO2: 99% (09-04-20 @ 02:29) (99% - 99%)    LIMITED AS PATIENT REFUSED COMPLETE EXAM    GENERAL: Laying in bed tremulous  HEAD:  Atraumatic, Normocephalic, temporal wasting  EENT: unable to evaluate  NECK: Supple, No JVD  CHEST/LUNG: crackles at bilat bases to mid lung  HEART: TAchy, Regular rhythm; No murmurs, rubs, or gallops  ABDOMEN: NABS; Soft, +tender, could not complete exam due to aggression during abdominal palpation  EXTREMITIES:  unable to evaluate  NERVOUS SYSTEM:  A&Ox3, no focal deficits   SKIN: No rashes or lesions

## 2020-09-04 NOTE — H&P ADULT - PROBLEM SELECTOR PLAN 2
untreated  - may need assistance to find [facility] or [facility] that provides fee scale service in setting of no insurance

## 2020-09-04 NOTE — H&P ADULT - ASSESSMENT
55 yoM PMH lung cancer untreated, admitted for alcohol withdrawal. Pt refused some of exam or to answer some questions

## 2020-09-04 NOTE — H&P ADULT - NSHPLABSRESULTS_GEN_ALL_CORE
LABS:                          12.8   6.91  )-----------( 241      ( 03 Sep 2020 17:05 )             40.0     09-03    138  |  96<L>  |  6<L>  ----------------------------<  90  3.8   |  25  |  0.76    Ca    8.7      03 Sep 2020 17:05    TPro  8.1  /  Alb  4.2  /  TBili  0.4  /  DBili  x   /  AST  73<H>  /  ALT  72<H>  /  AlkPhos  110  09-03 LABS:                          12.8   6.91  )-----------( 241      ( 03 Sep 2020 17:05 )             40.0     09-03    138  |  96<L>  |  6<L>  ----------------------------<  90  3.8   |  25  |  0.76    Ca    8.7      03 Sep 2020 17:05    TPro  8.1  /  Alb  4.2  /  TBili  0.4  /  DBili  x   /  AST  73<H>  /  ALT  72<H>  /  AlkPhos  110  09-03    < from: CT Abdomen and Pelvis w/ IV Cont (09.03.20 @ 20:23) >    Distended urinary bladder. Recommend correlation for retention.  Several focal liver lesions. Suggest follow-up MRI.  Multiple basilar pulmonary nodules. Recommend follow-up full chest CT.    < end of copied text >

## 2020-09-05 VITALS
HEART RATE: 81 BPM | SYSTOLIC BLOOD PRESSURE: 149 MMHG | TEMPERATURE: 98 F | DIASTOLIC BLOOD PRESSURE: 97 MMHG | RESPIRATION RATE: 15 BRPM | OXYGEN SATURATION: 100 %

## 2020-09-05 LAB
ANION GAP SERPL CALC-SCNC: 16 MMO/L — HIGH (ref 7–14)
BUN SERPL-MCNC: 11 MG/DL — SIGNIFICANT CHANGE UP (ref 7–23)
CALCIUM SERPL-MCNC: 9.1 MG/DL — SIGNIFICANT CHANGE UP (ref 8.4–10.5)
CHLORIDE SERPL-SCNC: 98 MMOL/L — SIGNIFICANT CHANGE UP (ref 98–107)
CO2 SERPL-SCNC: 23 MMOL/L — SIGNIFICANT CHANGE UP (ref 22–31)
CREAT SERPL-MCNC: 0.7 MG/DL — SIGNIFICANT CHANGE UP (ref 0.5–1.3)
GLUCOSE SERPL-MCNC: 83 MG/DL — SIGNIFICANT CHANGE UP (ref 70–99)
HCT VFR BLD CALC: 37.6 % — LOW (ref 39–50)
HGB BLD-MCNC: 12.4 G/DL — LOW (ref 13–17)
MAGNESIUM SERPL-MCNC: 1.9 MG/DL — SIGNIFICANT CHANGE UP (ref 1.6–2.6)
MCHC RBC-ENTMCNC: 33 % — SIGNIFICANT CHANGE UP (ref 32–36)
MCHC RBC-ENTMCNC: 33.8 PG — SIGNIFICANT CHANGE UP (ref 27–34)
MCV RBC AUTO: 102.5 FL — HIGH (ref 80–100)
NRBC # FLD: 0 K/UL — SIGNIFICANT CHANGE UP (ref 0–0)
PHOSPHATE SERPL-MCNC: 2.7 MG/DL — SIGNIFICANT CHANGE UP (ref 2.5–4.5)
PLATELET # BLD AUTO: 223 K/UL — SIGNIFICANT CHANGE UP (ref 150–400)
PMV BLD: 9.5 FL — SIGNIFICANT CHANGE UP (ref 7–13)
POTASSIUM SERPL-MCNC: 4.1 MMOL/L — SIGNIFICANT CHANGE UP (ref 3.5–5.3)
POTASSIUM SERPL-SCNC: 4.1 MMOL/L — SIGNIFICANT CHANGE UP (ref 3.5–5.3)
RBC # BLD: 3.67 M/UL — LOW (ref 4.2–5.8)
RBC # FLD: 13.7 % — SIGNIFICANT CHANGE UP (ref 10.3–14.5)
SODIUM SERPL-SCNC: 137 MMOL/L — SIGNIFICANT CHANGE UP (ref 135–145)
WBC # BLD: 4.49 K/UL — SIGNIFICANT CHANGE UP (ref 3.8–10.5)
WBC # FLD AUTO: 4.49 K/UL — SIGNIFICANT CHANGE UP (ref 3.8–10.5)

## 2020-09-05 PROCEDURE — 99239 HOSP IP/OBS DSCHRG MGMT >30: CPT

## 2020-09-05 RX ADMIN — ONDANSETRON 4 MILLIGRAM(S): 8 TABLET, FILM COATED ORAL at 04:41

## 2020-09-05 NOTE — PROGRESS NOTE ADULT - PROBLEM SELECTOR PROBLEM 3
Malignant neoplasm of lung, unspecified laterality, unspecified part of lung
Malignant neoplasm of lung, unspecified laterality, unspecified part of lung

## 2020-09-05 NOTE — DISCHARGE NOTE PROVIDER - NSDCCPCAREPLAN_GEN_ALL_CORE_FT
PRINCIPAL DISCHARGE DIAGNOSIS  Diagnosis: Alcohol withdrawal  Assessment and Plan of Treatment: Please follow up at [facility] [phone] if you are in need of assistance with substance abuse and abstinence. Call to make an appointment.      SECONDARY DISCHARGE DIAGNOSES  Diagnosis: Liver lesion  Assessment and Plan of Treatment: You were noted with focal liver lesions on CT imaging. Please follow up outpatient PCP in 1-2 weeks for further management and workup as warranted (e.g. MRI abdomen)    Diagnosis: Malignant neoplasm of lung, unspecified laterality, unspecified part of lung  Assessment and Plan of Treatment: You were noted with multiple basilar pulmonary nodules on CT imaging. Follow up outpatient oncologist in 1-2 weeks for further management.

## 2020-09-05 NOTE — PROGRESS NOTE ADULT - PROBLEM SELECTOR PLAN 3
Unclear when pt was diagnosed or staging. CXR with clear lungs. However, CT abd/pelvis revealed multiple basilar nodules measuring up to 7 mm right middle lobe. Pt confirms he was told he had lung cancer. He stated he has been "receiving treatment for the past 2 years" by a doctor in [place]. However, the patient did not disclose the doctors name or the facility he gets his care. He just reiterated he wanted to leave and be discharged from the hospital.   - patient not receptive to any referrals at this time for oncologic care.
Unclear when pt was diagnosed or staging. CXR with clear lungs. However, CT abd/pelvis revealed multiple basilar nodules measuring up to 7 mm right middle lobe  - may need assistance to find [facility] or [facility] that provides fee scale service in setting of no insurance. Will refer patient  - will try to obtain additional information

## 2020-09-05 NOTE — DISCHARGE NOTE NURSING/CASE MANAGEMENT/SOCIAL WORK - PATIENT PORTAL LINK FT
You can access the FollowMyHealth Patient Portal offered by [facility] by registering at the following website: [email or web address]. By joining [employer]’s FollowMyHealth portal, you will also be able to view your health information using other applications (apps) compatible with our system.

## 2020-09-05 NOTE — PROGRESS NOTE ADULT - PROBLEM SELECTOR PLAN 6
Unclear if patient has established outpatient care but patient not willing to share or obtain further information. Patient is medically stable for discharge today though.
Transitions of Care Status:  1.  Name of PCP: none  2.  PCP Contacted on Admission: [ ] Y    [ ] N    3.  PCP contacted at Discharge: [ ] Y    [ ] N    [ ] N/A  4.  Post-Discharge Appointment Date and Location:  5.  Summary of Handoff given to PCP:

## 2020-09-05 NOTE — PROGRESS NOTE ADULT - PROBLEM SELECTOR PLAN 2
Likely EtOH-induced. Increase in liver enzymes likely due to acute alcohol use with mild hepatitis.   - trend liver enzymes routinely  - EtOH cessation counseling.   -
Likely EtOH-induced. Increase in liver enzymes likely due to acute alcohol use with mild hepatitis.   - trend liver enzymes  - EtOH cessation counseling.   -

## 2020-09-05 NOTE — PROGRESS NOTE ADULT - PROBLEM SELECTOR PLAN 1
Hemodynamically stable. Started on Ativan taper. CIWA scores range 1-3  - d/c standing Ativan taper --> c/w symptom-triggered CIWA protocol with PRN Ativan. If pt requiring frequent PRN administration, will resume standing Ativan taper  - s/p thiamine
Hemodynamically stable. Started on Ativan taper. CIWA scores range 1-3  - d/c symptom-triggered CIWA protocol with PRN Ativan since not withdrawing  - s/p thiamine

## 2020-09-05 NOTE — PROGRESS NOTE ADULT - PROBLEM SELECTOR PLAN 4
Suspect alcohol-induced dyspepsia or gastritis.   - start PRN Maalox  - Zofran PRN for nausea and/or vomiting
Suspect alcohol-induced dyspepsia or gastritis.   - PRN Maalox  - Zofran PRN for nausea and/or vomiting

## 2020-09-05 NOTE — PROGRESS NOTE ADULT - SUBJECTIVE AND OBJECTIVE BOX
Patient is a 55y old  Male who presents with a chief complaint of alcohol withdrawal (05 Sep 2020 10:59)    SUBJECTIVE / OVERNIGHT EVENTS:  Patient fully dressed in his regular clothing this morning and stated he is ready to leave. He did not disclose any acute complaints. He did not require any PRN Ativan yesterday. No reports of chest pain, SOB, n/v.     MEDICATIONS  (STANDING):  enoxaparin Injectable 40 milliGRAM(s) SubCutaneous daily  influenza   Vaccine 0.5 milliLiter(s) IntraMuscular once    MEDICATIONS  (PRN):  aluminum hydroxide/magnesium hydroxide/simethicone Suspension 30 milliLiter(s) Oral every 4 hours PRN Dyspepsia  LORazepam   Injectable 2 milliGRAM(s) IV Push every 1 hour PRN CIWA-Ar score 8 or greater  ondansetron Injectable 4 milliGRAM(s) IV Push every 6 hours PRN Nausea and/or Vomiting      Vital Signs Last 24 Hrs  T(C): 36.7 (05 Sep 2020 08:20), Max: 37.1 (04 Sep 2020 14:25)  T(F): 98.1 (05 Sep 2020 08:20), Max: 98.7 (04 Sep 2020 14:25)  HR: 81 (05 Sep 2020 08:20) (79 - 109)  BP: 149/97 (05 Sep 2020 08:20) (113/82 - 149/97)  RR: 15 (05 Sep 2020 08:20) (15 - 19)  SpO2: 100% (05 Sep 2020 08:20) (98% - 100%)          PHYSICAL EXAM  GENERAL: NAD, Non-toxic appearing, malodorous  CHEST/LUNG: Normal respiratory effort  ABDOMEN: Nondistended  EXTREMITIES:  ROM intact, No clubbing, cyanosis, or edema  PSYCH: Alert, dismissive when questioned, Ox3, calm        LABS:                        12.4   4.49  )-----------( 223      ( 05 Sep 2020 06:13 )             37.6     09-05    137  |  98  |  11  ----------------------------<  83  4.1   |  23  |  0.70    Ca    9.1      05 Sep 2020 06:13  Phos  2.7     09-05  Mg     1.9     09-05
Patient is a 55y old  Male who presents with a chief complaint of alcohol withdrawal (04 Sep 2020 05:12)      SUBJECTIVE / OVERNIGHT EVENTS:  Patient complained of being drowsy. Pt also with generalized abdominal pain and nausea after eating lunch. No fever, chills, chest pain, SOB. No tremors. Patient with the hiccups since yesterday.     MEDICATIONS  (STANDING):  enoxaparin Injectable 40 milliGRAM(s) SubCutaneous daily  influenza   Vaccine 0.5 milliLiter(s) IntraMuscular once  LORazepam   Injectable 2 milliGRAM(s) IV Push every 4 hours  LORazepam   Injectable   IV Push     MEDICATIONS  (PRN):  aluminum hydroxide/magnesium hydroxide/simethicone Suspension 30 milliLiter(s) Oral every 4 hours PRN Dyspepsia  LORazepam   Injectable 2 milliGRAM(s) IV Push every 1 hour PRN CIWA-Ar score 8 or greater  ondansetron Injectable 4 milliGRAM(s) IV Push every 6 hours PRN Nausea and/or Vomiting      Vital Signs Last 24 Hrs  T(C): 37.1 (04 Sep 2020 14:25), Max: 37.3 (04 Sep 2020 07:25)  T(F): 98.7 (04 Sep 2020 14:25), Max: 99.1 (04 Sep 2020 07:25)  HR: 87 (04 Sep 2020 14:25) (87 - 108)  BP: 128/87 (04 Sep 2020 14:25) (113/82 - 146/78)  RR: 19 (04 Sep 2020 14:25) (16 - 19)  SpO2: 98% (04 Sep 2020 14:25) (98% - 100%)          PHYSICAL EXAM  GENERAL: NAD, drowsy, but conversant with eyes clothes for majority of the exam. Non-toxic appearing  CHEST/LUNG: Clear to auscultation bilaterally; No wheeze  HEART: Regular rate and rhythm; No murmurs, rubs, or gallops  ABDOMEN: Soft, Nontender, Nondistended; Bowel sounds present  EXTREMITIES:  ROM intact, No clubbing, cyanosis, or edema  PSYCH: Drowsy but responds to questions appropriately, Ox3, cooperative and calm          LABS:                        12.8   6.91  )-----------( 241      ( 03 Sep 2020 17:05 )             40.0     09-04    136  |  99  |  5<L>  ----------------------------<  64<L>  5.0   |  17<L>  |  0.57    Ca    8.7      04 Sep 2020 05:31  Phos  3.1     09-04  Mg     1.9     09-04    TPro  7.3  /  Alb  3.6  /  TBili  0.6  /  DBili  < 0.2  /  AST  81<H>  /  ALT  62<H>  /  AlkPhos  99  09-04

## 2020-09-05 NOTE — PROGRESS NOTE ADULT - ASSESSMENT
Mr. Ndiaye is a 56 yo man with PMH of reported untreated lung cancer and alcohol abuse disorder admitted for concern of alcohol withdrawal. Patient not actively withdrawing at this time.
Mr. Ndiaye is a 54 yo man with PMH of reported untreated lung cancer and alcohol abuse disorder admitted for management of alcohol withdrawal.

## 2020-09-05 NOTE — DISCHARGE NOTE PROVIDER - HOSPITAL COURSE
55 M PMH lung cancer, not getting treatment because of insurance presenting with alcohol intoxication that progressed to withdrawal. Pt states he drinks daily, drank right before coming to ED. Refused to answer further questions. Pt monitored on CIWA protocol and remained hemodynamically stable with stable CIWA scores.         Spoke with attending, Dr. Clayton 9/5, pt is medically stable for discharge to home.

## 2020-09-05 NOTE — DISCHARGE NOTE PROVIDER - NSFOLLOWUPCLINICS_GEN_ALL_ED_FT
[facility]  Internal Medicine  [address] [place] [address]  Phone: [phone]  Fax: [phone]  Follow Up Time:

## 2020-11-24 ENCOUNTER — INPATIENT (INPATIENT)
Facility: HOSPITAL | Age: 55
LOS: 1 days | Discharge: LEFT BEFORE DISCHARGE | End: 2020-11-26
Attending: INTERNAL MEDICINE | Admitting: INTERNAL MEDICINE
Payer: MEDICAID

## 2020-11-24 VITALS
OXYGEN SATURATION: 97 % | TEMPERATURE: 98 F | HEIGHT: 72 IN | WEIGHT: 160.06 LBS | HEART RATE: 60 BPM | RESPIRATION RATE: 20 BRPM | DIASTOLIC BLOOD PRESSURE: 93 MMHG | SYSTOLIC BLOOD PRESSURE: 152 MMHG

## 2020-11-24 LAB
ALBUMIN SERPL ELPH-MCNC: 4.1 G/DL — SIGNIFICANT CHANGE UP (ref 3.5–5.2)
ALP SERPL-CCNC: 93 U/L — SIGNIFICANT CHANGE UP (ref 30–115)
ALT FLD-CCNC: 10 U/L — SIGNIFICANT CHANGE UP (ref 0–41)
ANION GAP SERPL CALC-SCNC: 14 MMOL/L — SIGNIFICANT CHANGE UP (ref 7–14)
APTT BLD: 30.7 SEC — SIGNIFICANT CHANGE UP (ref 27–39.2)
AST SERPL-CCNC: 24 U/L — SIGNIFICANT CHANGE UP (ref 0–41)
BASOPHILS # BLD AUTO: 0.08 K/UL — SIGNIFICANT CHANGE UP (ref 0–0.2)
BASOPHILS NFR BLD AUTO: 1.1 % — HIGH (ref 0–1)
BILIRUB SERPL-MCNC: <0.2 MG/DL — SIGNIFICANT CHANGE UP (ref 0.2–1.2)
BUN SERPL-MCNC: 7 MG/DL — LOW (ref 10–20)
CALCIUM SERPL-MCNC: 8.9 MG/DL — SIGNIFICANT CHANGE UP (ref 8.5–10.1)
CHLORIDE SERPL-SCNC: 101 MMOL/L — SIGNIFICANT CHANGE UP (ref 98–110)
CO2 SERPL-SCNC: 23 MMOL/L — SIGNIFICANT CHANGE UP (ref 17–32)
CREAT SERPL-MCNC: 0.6 MG/DL — LOW (ref 0.7–1.5)
EOSINOPHIL # BLD AUTO: 0.08 K/UL — SIGNIFICANT CHANGE UP (ref 0–0.7)
EOSINOPHIL NFR BLD AUTO: 1.1 % — SIGNIFICANT CHANGE UP (ref 0–8)
GLUCOSE SERPL-MCNC: 143 MG/DL — HIGH (ref 70–99)
HCT VFR BLD CALC: 35 % — LOW (ref 42–52)
HGB BLD-MCNC: 11.7 G/DL — LOW (ref 14–18)
IMM GRANULOCYTES NFR BLD AUTO: 0.3 % — SIGNIFICANT CHANGE UP (ref 0.1–0.3)
INR BLD: 0.9 RATIO — SIGNIFICANT CHANGE UP (ref 0.65–1.3)
LYMPHOCYTES # BLD AUTO: 1.57 K/UL — SIGNIFICANT CHANGE UP (ref 1.2–3.4)
LYMPHOCYTES # BLD AUTO: 21.7 % — SIGNIFICANT CHANGE UP (ref 20.5–51.1)
MAGNESIUM SERPL-MCNC: 1.7 MG/DL — LOW (ref 1.8–2.4)
MCHC RBC-ENTMCNC: 33.4 G/DL — SIGNIFICANT CHANGE UP (ref 32–37)
MCHC RBC-ENTMCNC: 33.4 PG — HIGH (ref 27–31)
MCV RBC AUTO: 100 FL — HIGH (ref 80–94)
MONOCYTES # BLD AUTO: 0.42 K/UL — SIGNIFICANT CHANGE UP (ref 0.1–0.6)
MONOCYTES NFR BLD AUTO: 5.8 % — SIGNIFICANT CHANGE UP (ref 1.7–9.3)
NEUTROPHILS # BLD AUTO: 5.08 K/UL — SIGNIFICANT CHANGE UP (ref 1.4–6.5)
NEUTROPHILS NFR BLD AUTO: 70 % — SIGNIFICANT CHANGE UP (ref 42.2–75.2)
NRBC # BLD: 0 /100 WBCS — SIGNIFICANT CHANGE UP (ref 0–0)
NT-PROBNP SERPL-SCNC: 74 PG/ML — SIGNIFICANT CHANGE UP (ref 0–300)
PLATELET # BLD AUTO: 397 K/UL — SIGNIFICANT CHANGE UP (ref 130–400)
POTASSIUM SERPL-MCNC: 4.6 MMOL/L — SIGNIFICANT CHANGE UP (ref 3.5–5)
POTASSIUM SERPL-SCNC: 4.6 MMOL/L — SIGNIFICANT CHANGE UP (ref 3.5–5)
PROT SERPL-MCNC: 7.5 G/DL — SIGNIFICANT CHANGE UP (ref 6–8)
PROTHROM AB SERPL-ACNC: 10.3 SEC — SIGNIFICANT CHANGE UP (ref 9.95–12.87)
RBC # BLD: 3.5 M/UL — LOW (ref 4.7–6.1)
RBC # FLD: 14.5 % — SIGNIFICANT CHANGE UP (ref 11.5–14.5)
SODIUM SERPL-SCNC: 138 MMOL/L — SIGNIFICANT CHANGE UP (ref 135–146)
TROPONIN T SERPL-MCNC: <0.01 NG/ML — SIGNIFICANT CHANGE UP
TROPONIN T SERPL-MCNC: <0.01 NG/ML — SIGNIFICANT CHANGE UP
WBC # BLD: 7.25 K/UL — SIGNIFICANT CHANGE UP (ref 4.8–10.8)
WBC # FLD AUTO: 7.25 K/UL — SIGNIFICANT CHANGE UP (ref 4.8–10.8)

## 2020-11-24 PROCEDURE — 99223 1ST HOSP IP/OBS HIGH 75: CPT

## 2020-11-24 PROCEDURE — 71045 X-RAY EXAM CHEST 1 VIEW: CPT | Mod: 26

## 2020-11-24 PROCEDURE — 99233 SBSQ HOSP IP/OBS HIGH 50: CPT | Mod: 25

## 2020-11-24 PROCEDURE — 70450 CT HEAD/BRAIN W/O DYE: CPT | Mod: 26,77

## 2020-11-24 PROCEDURE — 99285 EMERGENCY DEPT VISIT HI MDM: CPT

## 2020-11-24 PROCEDURE — 99408 AUDIT/DAST 15-30 MIN: CPT

## 2020-11-24 PROCEDURE — 70450 CT HEAD/BRAIN W/O DYE: CPT | Mod: 26

## 2020-11-24 PROCEDURE — 93010 ELECTROCARDIOGRAM REPORT: CPT | Mod: 76

## 2020-11-24 PROCEDURE — 71260 CT THORAX DX C+: CPT | Mod: 26

## 2020-11-24 RX ORDER — MAGNESIUM SULFATE 500 MG/ML
2 VIAL (ML) INJECTION ONCE
Refills: 0 | Status: COMPLETED | OUTPATIENT
Start: 2020-11-24 | End: 2020-11-24

## 2020-11-24 RX ORDER — THIAMINE MONONITRATE (VIT B1) 100 MG
100 TABLET ORAL DAILY
Refills: 0 | Status: DISCONTINUED | OUTPATIENT
Start: 2020-11-24 | End: 2020-11-26

## 2020-11-24 RX ORDER — ASPIRIN/CALCIUM CARB/MAGNESIUM 324 MG
325 TABLET ORAL DAILY
Refills: 0 | Status: DISCONTINUED | OUTPATIENT
Start: 2020-11-24 | End: 2020-11-24

## 2020-11-24 RX ORDER — ASPIRIN/CALCIUM CARB/MAGNESIUM 324 MG
81 TABLET ORAL DAILY
Refills: 0 | Status: DISCONTINUED | OUTPATIENT
Start: 2020-11-24 | End: 2020-11-26

## 2020-11-24 RX ORDER — ENOXAPARIN SODIUM 100 MG/ML
40 INJECTION SUBCUTANEOUS DAILY
Refills: 0 | Status: DISCONTINUED | OUTPATIENT
Start: 2020-11-24 | End: 2020-11-24

## 2020-11-24 RX ORDER — FOLIC ACID 0.8 MG
1 TABLET ORAL DAILY
Refills: 0 | Status: DISCONTINUED | OUTPATIENT
Start: 2020-11-24 | End: 2020-11-26

## 2020-11-24 RX ORDER — SENNA PLUS 8.6 MG/1
2 TABLET ORAL AT BEDTIME
Refills: 0 | Status: DISCONTINUED | OUTPATIENT
Start: 2020-11-24 | End: 2020-11-26

## 2020-11-24 RX ORDER — ACETAMINOPHEN 500 MG
650 TABLET ORAL EVERY 6 HOURS
Refills: 0 | Status: DISCONTINUED | OUTPATIENT
Start: 2020-11-24 | End: 2020-11-26

## 2020-11-24 RX ORDER — CHLORHEXIDINE GLUCONATE 213 G/1000ML
1 SOLUTION TOPICAL
Refills: 0 | Status: DISCONTINUED | OUTPATIENT
Start: 2020-11-24 | End: 2020-11-26

## 2020-11-24 RX ADMIN — Medication 25 MILLIGRAM(S): at 17:35

## 2020-11-24 RX ADMIN — Medication 25 MILLIGRAM(S): at 14:56

## 2020-11-24 RX ADMIN — Medication 25 MILLIGRAM(S): at 21:42

## 2020-11-24 RX ADMIN — Medication 50 MILLIGRAM(S): at 07:35

## 2020-11-24 RX ADMIN — Medication 50 GRAM(S): at 14:12

## 2020-11-24 RX ADMIN — Medication 325 MILLIGRAM(S): at 07:31

## 2020-11-24 NOTE — ED PROVIDER NOTE - OBJECTIVE STATEMENT
The patient is a 55 year old male with a history of alcohol abuse presenting for chest pain. Pt states chest pain woke him up from sleep. The patient is a 55 year old male with a history of alcohol abuse presenting for chest pain. Pt states chest pain woke him up from sleep. Pain located left side of chest, 8/10, pressure-like, nonradiating. States he walked to the bathroom this morning and had a fall, hitting the back of his head. Pt not on AC. Denies LOC, nausea, vomiting, shortness of breath, abdominal pain. Pt states he drinks 3-4 beers a day; last drink was 1 hour prior to arrival. The patient is a 55 year old male with a history of lung cancer, alcohol abuse presenting for chest pain. Pt states chest pain woke him up from sleep. Pain located left side of chest, 8/10, pressure-like, nonradiating. States he walked to the bathroom this morning and had a fall, hitting the back of his head. Pt not on AC. Denies LOC, nausea, vomiting, shortness of breath, abdominal pain. Pt states he drinks 3-4 beers a day; last drink was 1 hour prior to arrival. Pt states he is aware he has lung cancer and has not followed up with anyone for it.

## 2020-11-24 NOTE — ED PROVIDER NOTE - PROGRESS NOTE DETAILS
TA: Discussed results of CXR/CT; patient states he is aware of lung cancer and has not followed up with anyone for it.   Consulted neurosurgery and trauma for traumatic trace subdural hemorrhage on CT head. Head CT read as questionable small SDH vs mineralization. Clinically I do not believe this represents a SDH. Will consult trauma and neurosurgery. TA: Spoke with neurosurgery PA; stated patient refused exam and would recommend repeat CT if exam changes.

## 2020-11-24 NOTE — CONSULT NOTE ADULT - SUBJECTIVE AND OBJECTIVE BOX
55y m    TRAUMA ACTIVATION LEVEL:  Consult    MECHANISM OF INJURY:   [] Blunt                            [] MVC                   [x] Fall	  [] Pedestrian Struck      [] Motorcycle       [] Assault     [] Bicycle collision          [] Sports injury     [] Penetrating  [] Gun Shot Wound 	 [] Stab Wound    GCS: 	E: 4	V: 5	M: 6      HPI:  The patient is a 55 year old male with a history of alcohol abuse presenting for chest pain. Pt states chest pain woke him up from sleep. Pain located left side of chest, 8/10, pressure-like, nonradiating. States he walked to the bathroom this morning and had a fall, hitting the back of his head. Pt not on AC. Denies LOC, nausea, vomiting, shortness of breath, abdominal pain. Pt states he drinks 3-4 beers a day; last drink was 1 hour prior to arrival.    Trauma consulted for fall, as above. Pt endorses fall, but does not recall hitting his head. ?HT, -LOC, -AC.     PAST MEDICAL & SURGICAL HISTORY:      Allergies    No Known Allergies    Intolerances        Home Medications:      ROS: 10-system review is otherwise negative except HPI above.      Primary Survey:    A - airway intact  B - bilateral breath sounds and good chest rise  C - palpable pulses in all extremities  D - GCS 15 on arrival, LUNSFORD  Exposure obtained    Vital Signs Last 24 Hrs  T(C): 36.9 (24 Nov 2020 07:45), Max: 36.9 (24 Nov 2020 07:45)  T(F): 98.5 (24 Nov 2020 07:45), Max: 98.5 (24 Nov 2020 07:45)  HR: 86 (24 Nov 2020 07:45) (60 - 86)  BP: 148/75 (24 Nov 2020 07:45) (135/72 - 152/93)  BP(mean): --  RR: 18 (24 Nov 2020 07:45) (18 - 20)  SpO2: 98% (24 Nov 2020 07:45) (97% - 98%)    Secondary Survey:   General: NAD  HEENT: Normocephalic, atraumatic, EOMI, PEERLA. no scalp lacerations   Neck: Soft, midline trachea. no cspine tenderness  Chest: No chest wall tenderness. or subq  emphysema   Cardiac: S1, S2, RRR  Respiratory: Bilateral breath sounds, clear and equal bilaterally  Abdomen: Soft, non-distended, non-tender, no rebound,   Groin: Normal appearing, pelvis stable   Ext: palp radial b/l UE, b/l DP palp in Lower Extrem.   Back: no TTP, no palpable runoff/stepoff/deformity  Rectal: No orlin blood, HASEEB with good tone    FAST    Procedures:    LABS:  Labs:  CAPILLARY BLOOD GLUCOSE      POCT Blood Glucose.: 127 mg/dL (24 Nov 2020 07:52)  POCT Blood Glucose.: 65 mg/dL (24 Nov 2020 06:52)                          11.7   7.25  )-----------( 397      ( 24 Nov 2020 07:30 )             35.0       Auto Neutrophil %: 70.0 % (11-24-20 @ 07:30)  Auto Immature Granulocyte %: 0.3 % (11-24-20 @ 07:30)    11-24    138  |  101  |  7<L>  ----------------------------<  143<H>  4.6   |  23  |  0.6<L>      Calcium, Total Serum: 8.9 mg/dL (11-24-20 @ 07:30)      LFTs:             7.5  | <0.2 | 24       ------------------[93      ( 24 Nov 2020 07:30 )  4.1  | x    | 10          Lipase:x      Amylase:x             Coags:     10.30  ----< 0.90    ( 24 Nov 2020 07:30 )     30.7        CARDIAC MARKERS ( 24 Nov 2020 07:30 )  x     / <0.01 ng/mL / x     / x     / x          Serum Pro-Brain Natriuretic Peptide: 74 pg/mL (11-24-20 @ 07:30)                  RADIOLOGY & ADDITIONAL STUDIES:      ---------------------------------------------------------------------------------------    ASSESSMENT:  55y old m s/p    PLAN:    -   -   -   -  d/w 55y m    TRAUMA ACTIVATION LEVEL:  Consult    MECHANISM OF INJURY:   [] Blunt                            [] MVC                   [x] Fall	  [] Pedestrian Struck      [] Motorcycle       [] Assault     [] Bicycle collision          [] Sports injury     [] Penetrating  [] Gun Shot Wound 	 [] Stab Wound    GCS: 	E: 4	V: 5	M: 6      HPI:  The patient is a 55 year old male with a history of alcohol abuse presenting for chest pain. Pt states chest pain woke him up from sleep. Pain located left side of chest, 8/10, pressure-like, nonradiating. States he walked to the bathroom this morning and had a fall, hitting the back of his head. Pt not on AC. Denies LOC, nausea, vomiting, shortness of breath, abdominal pain. Pt states he drinks 3-4 beers a day; last drink was 1 hour prior to arrival.    Trauma consulted for fall, as above. Pt endorses fall, but does not recall hitting his head. ?HT, -LOC, -AC. Pt denies pain to head, neck, chest, extremities. Pt denies CP, SOB, n/v/d, fever, chills, urinary symptoms.    Further conversations w/ nsgy team reveal an inconsistent story, pt did not give hx of fall to their team.    PAST MEDICAL & SURGICAL HISTORY:      Allergies    No Known Allergies    Intolerances        Home Medications:      ROS: 10-system review is otherwise negative except HPI above.      Primary Survey:    A - airway intact  B - bilateral breath sounds and good chest rise  C - palpable pulses in all extremities  D - GCS 15 on arrival, LUNSFORD  Exposure obtained    Vital Signs Last 24 Hrs  T(C): 36.9 (24 Nov 2020 07:45), Max: 36.9 (24 Nov 2020 07:45)  T(F): 98.5 (24 Nov 2020 07:45), Max: 98.5 (24 Nov 2020 07:45)  HR: 86 (24 Nov 2020 07:45) (60 - 86)  BP: 148/75 (24 Nov 2020 07:45) (135/72 - 152/93)  BP(mean): --  RR: 18 (24 Nov 2020 07:45) (18 - 20)  SpO2: 98% (24 Nov 2020 07:45) (97% - 98%)    Secondary Survey:   General: NAD  HEENT: Normocephalic, atraumatic, EOMI, PEERLA. no scalp lacerations   Neck: Soft, midline trachea. no cspine tenderness  Chest: No chest wall tenderness. or subq  emphysema   Cardiac: S1, S2, RRR  Respiratory: Bilateral breath sounds, clear and equal bilaterally  Abdomen: Soft, non-distended, non-tender, no rebound,   Groin: Normal appearing, pelvis stable   Ext: palp radial b/l UE, b/l DP palp in Lower Extrem.   Back: no TTP, no palpable runoff/stepoff/deformity    FAST    Procedures:    LABS:  Labs:  CAPILLARY BLOOD GLUCOSE      POCT Blood Glucose.: 127 mg/dL (24 Nov 2020 07:52)  POCT Blood Glucose.: 65 mg/dL (24 Nov 2020 06:52)                          11.7   7.25  )-----------( 397      ( 24 Nov 2020 07:30 )             35.0       Auto Neutrophil %: 70.0 % (11-24-20 @ 07:30)  Auto Immature Granulocyte %: 0.3 % (11-24-20 @ 07:30)    11-24    138  |  101  |  7<L>  ----------------------------<  143<H>  4.6   |  23  |  0.6<L>      Calcium, Total Serum: 8.9 mg/dL (11-24-20 @ 07:30)      LFTs:             7.5  | <0.2 | 24       ------------------[93      ( 24 Nov 2020 07:30 )  4.1  | x    | 10          Lipase:x      Amylase:x             Coags:     10.30  ----< 0.90    ( 24 Nov 2020 07:30 )     30.7        CARDIAC MARKERS ( 24 Nov 2020 07:30 )  x     / <0.01 ng/mL / x     / x     / x          Serum Pro-Brain Natriuretic Peptide: 74 pg/mL (11-24-20 @ 07:30)      RADIOLOGY & ADDITIONAL STUDIES:    < from: CT Head No Cont (11.24.20 @ 08:06) >    Minimal prominence in the thickness of the posterior falx which could reflect a tracemineralization versus a trace subdural hemorrhage. Short-term follow-up in 4 to 5 hours could be obtained to check for stability.    No other findings suspicious for intracranial hemorrhage or acute infarction.    < end of copied text >    < from: CT Chest w/ IV Cont (11.24.20 @ 08:14) >  1.  Irregular 3.4 x 2.7 cm left upper lobe mass, highly suspicious for malignancy. Multiple bilateral pulmonary nodules, may represent metastases - no prior imaging is available for comparison.  2.  Irregularly marginated right adrenal mass, highly suspicious for metastasis.    < end of copied text >    < from: Xray Chest 1 View- PORTABLE-Urgent (Xray Chest 1 View- PORTABLE-Urgent .) (11.24.20 @ 07:23) >  Left suprahilar opacity measuring approximately 3 cm. Further evaluation with chest CT is recommended to evaluate for the possibility of neoplasm    < end of copied text >    ---------------------------------------------------------------------------------------

## 2020-11-24 NOTE — PHYSICAL THERAPY INITIAL EVALUATION ADULT - GENERAL OBSERVATIONS, REHAB EVAL
245-310 pm Chart reviewed. Pt. seen semirecline in bed , in No apparent distress , + IV lock, denies pain, Pt. agreed to activity/therapy.

## 2020-11-24 NOTE — PHYSICAL THERAPY INITIAL EVALUATION ADULT - PERTINENT HX OF CURRENT PROBLEM, REHAB EVAL
55 years old homeless male with past medical history of left lung mass (diagnosed 2 years ago, but lost to follow up), former smoker (7 pack-years, quit 3 months ago), active alcohol use disorder, presented to ED with a complaint of chest pain x 1 day. Admitted for chest pain and mechanical fall.

## 2020-11-24 NOTE — H&P ADULT - ATTENDING COMMENTS
PHYSICAL EXAM:    CONSTITUTIONAL: NAD, somnolent, disheveled   ENMT: EOMI, PERRLA, No tonsillar erythema, exudates, or enlargement, neck supple, No JVD  PSYCH: Alert & Oriented X3, denies auditory or visual hallucinations   RESPIRATORY: Clear to percussion bilaterally; No rales, rhonchi, wheezing, or rubs  CARDIOVASCULAR: Regular rate and rhythm; No murmurs, rubs, or gallops, negative edema  GASTROINTESTINAL: Soft, Nontender, Nondistended; Bowel sounds present  EXTREMITIES:  2+ Peripheral Pulses, No clubbing, cyanosis  SKIN: No rashes or lesions    56 yo M, homeless, poor historian, as per ED his story has been inconsistent, PMHx of Lung Cancer (patient states he does not recall when he was diagnosed, has not followed up with Pulmonary or Oncology), Active Alcohol Use Disorder, presented with complaint of acute onset, left sided, nonradiating, persistent, no known modifying factors, chest pain. Patient then states he fell on day of presentation, sustaining positive head trauma, patient denies any antecedent symptoms, denies loss of consciousness. Patient drinks 3-4 beers a day, last drink this morning, however bilateral upper extremity tremors noted, patient states he feels like he is withdrawing. [place] declines need to contact them.    #Chest Pain: troponin negative x1, f/u repeat levels, 2D-Echo, s/p 325mg of ASA, continue ASA-81mg, Cardiology evaluation     #s/p unwitnessed fall likely Mechanical vs. ETOH intoxication, sustaining questionable trace Subdural Hemorrhage vs trace mineralization: Neurosurgery recommendations noted, neuro checks per unit, fall precautions, PT/OT assessment, repeat CT Head this evening     #Alcohol Use Disorder, suspected underlying folate, thiamine deficiency, Hypomagnesmia-repleted: current CIWA score 5 s/p 50mg of Librium, continue CIWA monitoring, start Libirum taper, supplement folate, thiamine, and multivitamins, alcohol cessation counseled     #Macrocytic Anemia: likely secondary to Alcohol Abuse and underlying vitamin deficiencies, work up as outpatient    #Left Upper Lobe Mass likely secondary to known Malignancy, Irregularly marginated right adrenal mass, highly suspicious for metastasis: Oncology evaluation, unknown if patient has previously refused treatment in the past or simply lost to follow up    #Elevated D-dimer likely secondary to known malignancy, CT Chest negative for PE    Disposition:  and case management for safe disposition patient is homeless PHYSICAL EXAM:    CONSTITUTIONAL: NAD, somnolent, disheveled   ENMT: EOMI, PERRLA, No tonsillar erythema, exudates, or enlargement, neck supple, No JVD  PSYCH: Alert & Oriented X3, denies auditory or visual hallucinations   RESPIRATORY: Clear to percussion bilaterally; No rales, rhonchi, wheezing, or rubs  CARDIOVASCULAR: Regular rate and rhythm; No murmurs, rubs, or gallops, negative edema  GASTROINTESTINAL: Soft, Nontender, Nondistended; Bowel sounds present  EXTREMITIES:  2+ Peripheral Pulses, No clubbing, cyanosis  SKIN: No rashes or lesions    54 yo M, homeless, poor historian, as per ED his story has been inconsistent, PMHx of Lung Cancer (patient states he does not recall when he was diagnosed, has not followed up with Pulmonary or Oncology), Active Alcohol Use Disorder, presented with complaint of acute onset, left sided, nonradiating, persistent, no known modifying factors, chest pain. Patient then states he fell on day of presentation, sustaining positive head trauma, patient denies any antecedent symptoms, denies loss of consciousness. Patient drinks 3-4 beers a day, last drink this morning, however bilateral upper extremity tremors noted, patient states he feels like he is withdrawing. Patient[place] declines need to contact them.    #Chest Pain: troponin negative x1, f/u repeat levels, 2D-Echo, s/p 325mg of ASA, continue ASA-81mg, Cardiology evaluation     #s/p unwitnessed fall likely Mechanical vs. ETOH intoxication, sustaining questionable trace Subdural Hemorrhage vs trace mineralization: Neurosurgery recommendations noted, neuro checks per unit, fall precautions, PT/OT assessment, repeat CT Head this evening     #Alcohol Use Disorder, suspected underlying folate, thiamine deficiency, Hypomagnesmia-repleted: current CIWA score 5 s/p 50mg of Librium, continue CIWA monitoring, start Libirum taper, supplement folate, thiamine, and multivitamins, alcohol cessation counseled, CATCH team asessment     #Macrocytic Anemia: likely secondary to Alcohol Abuse and underlying vitamin deficiencies, work up as outpatient    #Left Upper Lobe Mass likely secondary to known Malignancy, Irregularly marginated right adrenal mass, highly suspicious for metastasis: Oncology evaluation, unknown if patient has previously refused treatment in the past or simply lost to follow up    #Elevated D-dimer likely secondary to known malignancy, CT Chest negative for PE    Disposition:  and case management for safe disposition patient is homeless PHYSICAL EXAM:    CONSTITUTIONAL: NAD, somnolent, disheveled   ENMT: EOMI, PERRLA, No tonsillar erythema, exudates, or enlargement, neck supple, No JVD  PSYCH: Alert & Oriented X3, denies auditory or visual hallucinations   RESPIRATORY: Clear to percussion bilaterally; No rales, rhonchi, wheezing, or rubs  CARDIOVASCULAR: Regular rate and rhythm; No murmurs, rubs, or gallops, negative edema  GASTROINTESTINAL: Soft, Nontender, Nondistended; Bowel sounds present  EXTREMITIES:  2+ Peripheral Pulses, No clubbing, cyanosis  SKIN: No rashes or lesions    56 yo M, homeless, poor historian, as per ED his story has been inconsistent, PMHx of Lung Cancer (patient states he does not recall when he was diagnosed, has not followed up with Pulmonary or Oncology), Active Alcohol Use Disorder, presented with complaint of acute onset, left sided, nonradiating, persistent, no known modifying factors, chest pain. Patient then states he fell on day of presentation, sustaining positive head trauma, patient denies any antecedent symptoms, denies loss of consciousness. Patient drinks 3-4 beers a day, last drink this morning, however bilateral upper extremity tremors noted, patient states he feels like he is withdrawing. Patient's [place]" declines need to contact them.    #Chest Pain: troponin negative x1, f/u repeat levels, 2D-Echo, s/p 325mg of ASA, continue ASA-81mg, Cardiology evaluation     #s/p unwitnessed fall likely Mechanical vs. ETOH intoxication, sustaining questionable trace Subdural Hemorrhage vs trace mineralization: Neurosurgery recommendations noted, neuro checks per unit, fall precautions, PT/OT assessment, repeat CT Head this evening     #Alcohol Use Disorder, Withdrawal, suspected underlying folate, thiamine deficiency, Hypomagnesmia-repleted: current CIWA score 5 s/p 50mg of Librium, continue CIWA monitoring, start Libirum taper, supplement folate, thiamine, and multivitamins, alcohol cessation counseled, CATCH team asessment     #Macrocytic Anemia: likely secondary to Alcohol Abuse and underlying vitamin deficiencies, work up as outpatient    #Left Upper Lobe Mass likely secondary to known Malignancy, Irregularly marginated right adrenal mass, highly suspicious for metastasis: Oncology evaluation, unknown if patient has previously refused treatment in the past or simply lost to follow up    #Elevated D-dimer likely secondary to known malignancy, CT Chest negative for PE    Disposition:  and case management for safe disposition patient is homeless

## 2020-11-24 NOTE — H&P ADULT - NSHPPHYSICALEXAM_GEN_ALL_CORE
GENERAL: NAD, lying in bed comfortably  HEAD: Atraumatic, Normocephalic  EYES: EOMI, PERRLA, conjunctiva pink and cornea white  ENT: Normal external ears and nose, no discharges; moist mucous membranes, no erythema on posterior oropharynx  NECK: Supple, nontender to palpation; no JVD  CHEST/LUNG: Clear to auscultation bilaterally; No rales, rhonchi, wheezing, or rubs. Unlabored respirations  HEART: Regular rate and rhythm; No murmurs, rubs, or gallops  ABDOMEN: Soft, nontender, nondistended; no rebound tenderness, no guarding; no hepatomegaly; normoactive/hyperactive/hypoactive bowel sounds  EXTREMITIES:  2+ Peripheral Pulses, brisk capillary refill. No clubbing, cyanosis, or petal edema  NERVOUS SYSTEM: Alert and oriented to person, time, place and situation, speech clear. No focal deficits   MSK: FROM all 4 extremities, full and equal strength  SKIN: No rashes or lesions GENERAL: NAD, lying in bed comfortably, drowsy but arousable  HEAD: Atraumatic, Normocephalic  EYES: EOMI, PERRLA, conjunctiva pink and cornea white  ENT: Normal external ears and nose, no discharges; moist mucous membranes  NECK: Supple, nontender to palpation; no JVD  CHEST/LUNG: Rhonchi on left upper lobe  HEART: Regular rhythm and tachycardia; No murmurs, rubs, or gallops  ABDOMEN: Soft, nontender, nondistended; no rebound tenderness, no guarding; no hepatomegaly; normoactive bowel sounds  EXTREMITIES:  2+ Peripheral Pulses, brisk capillary refill. No clubbing, cyanosis, or petal edema  NERVOUS SYSTEM: Alert and oriented to person, time, place and situation, speech clear. No focal deficits, mild tremors on bilateral hands, no restlessness  MSK: FROM all 4 extremities, full and equal strength  SKIN: No rashes or lesions

## 2020-11-24 NOTE — CONSULT NOTE ADULT - SUBJECTIVE AND OBJECTIVE BOX
HISTORY OF PRESENT ILLNESS: 55y Male w/ known hx of lung mass, emphysema, Etoh use presents to the ED s/p fall backwards this AM +HT -LOC reporting chest pain. Given 325 mg ASA in the ED.     PAST MEDICAL & SURGICAL HISTORY:    FAMILY HISTORY: unknown     Allergies :   No Known Allergies    REVIEW OF SYSTEMS : All systems negative other than those listed in HPI  General:	-  Skin/Breast: -  Ophthalmologic: -   ENMT: -  Respiratory and Thorax: -  Cardiovascular: -	  Gastrointestinal: -  Genitourinary:-  Musculoskeletal:	-  Neurological:	-  Psychiatric:	-  Hematology/Lymphatics:	-  Endocrine:-  Allergic/Immunologic:	-    MEDICATIONS:    Antibiotics:    Anticoagulation:    OTHER:    Vital Signs Last 24 Hrs  T(C): 36.9 (24 Nov 2020 07:45), Max: 36.9 (24 Nov 2020 07:45)  T(F): 98.5 (24 Nov 2020 07:45), Max: 98.5 (24 Nov 2020 07:45)  HR: 86 (24 Nov 2020 07:45) (60 - 86)  BP: 148/75 (24 Nov 2020 07:45) (135/72 - 152/93)  BP(mean): --  RR: 18 (24 Nov 2020 07:45) (18 - 20)  SpO2: 98% (24 Nov 2020 07:45) (97% - 98%)    Physical Exam :  General :   A&O x  Tongue midline  Facial features symmetric, No droop  Speech clear and appropriate, no slur   Pt speaking in full sentences   Follows all commands   Occular :   PERRLA, no aniscoria or nystagmus  EOMI, no deviation or gaze preference   VA intact no diplopia   Motor :   MAEx4 b/l  strength 5/5  Shoulder shrug intact   Full ROM of neck   No spinal point tenderness   Withdraws / Extends to painful stimuli  Sensory :  Intact bilaterally   Cerbellar :  MARY intact  Finger to nose intact   Pronator Drift :   Hoffmans :       LABS:                        11.7   7.25  )-----------( 397      ( 24 Nov 2020 07:30 )             35.0     11-24    138  |  101  |  7<L>  ----------------------------<  143<H>  4.6   |  23  |  0.6<L>    Ca    8.9      24 Nov 2020 07:30  Mg     1.7     11-24    TPro  7.5  /  Alb  4.1  /  TBili  <0.2  /  DBili  x   /  AST  24  /  ALT  10  /  AlkPhos  93  11-24    PT/INR - ( 24 Nov 2020 07:30 )   PT: 10.30 sec;   INR: 0.90 ratio      PTT - ( 24 Nov 2020 07:30 )  PTT:30.7 sec    RADIOLOGY & ADDITIONAL STUDIES:  < from: CT Head No Cont (11.24.20 @ 08:06) >    IMPRESSION:    Minimal prominence in the thickness of the posterior falx which could reflect a tracemineralization versus a trace subdural hemorrhage. Short-term follow-up in 4 to 5 hours could be obtained to check for stability.    No other findings suspicious for intracranial hemorrhage or acute infarction.    Spoke with DORYS MORRISON on 11/24/2020 8:31 AM with readback.      KAELA WEBSTER M.D., ATTENDING RADIOLOGIST  This document has been electronically signed. Nov 24 2020  8:32AM    < end of copied text >    Assessment / Plan:     HISTORY OF PRESENT ILLNESS: 55y Male w/ known hx of lung mass, emphysema, Etoh use presents to the ED s/p fall backwards this AM +HT -LOC reporting chest pain per ED. Given 325 mg ASA in the ED.  - During consult pt denies any trauma, fall, head trauma, LOC, HA, n/v, vision changes and states he took the bus to the ED. Pt admits to abdominal pain and reports having 2 beers this morning and needs "something for the shakes".     PAST MEDICAL & SURGICAL HISTORY:    FAMILY HISTORY: unknown     Allergies :   No Known Allergies    REVIEW OF SYSTEMS : All systems negative other than those listed in HPI  General:	-  Skin/Breast: -  Ophthalmologic: -   ENMT: -  Respiratory and Thorax: -  Cardiovascular: -	  Gastrointestinal: -  Genitourinary:-  Musculoskeletal:	-  Neurological:	-  Psychiatric:	-  Hematology/Lymphatics:	-  Endocrine:-  Allergic/Immunologic:	-    MEDICATIONS:  Anticoagulation: denies use       Vital Signs Last 24 Hrs  T(C): 36.9 (24 Nov 2020 07:45), Max: 36.9 (24 Nov 2020 07:45)  T(F): 98.5 (24 Nov 2020 07:45), Max: 98.5 (24 Nov 2020 07:45)  HR: 86 (24 Nov 2020 07:45) (60 - 86)  BP: 148/75 (24 Nov 2020 07:45) (135/72 - 152/93)  BP(mean): --  RR: 18 (24 Nov 2020 07:45) (18 - 20)  SpO2: 98% (24 Nov 2020 07:45) (97% - 98%)    Physical Exam :  General : Pt sitting upright in stretcher in no apparent distress, pt requesting to "be left alone"   A&O x 3   Pt oriented to person place and time   Tongue midline  Facial features symmetric, No droop  Speech clear and appropriate, no slur   Pt speaking in full sentences   Pt uncooperative, refusing full exam   Occular :   Pt sitting with eyes closed opens to command  PERRLA, EOMI  Motor :   MAEx4 b/l  Refuses strength / reflex exam   Cerbellar :  MARY : pt refusing to cooperate   Finger to nose : pt refusing to cooperate for exam  Pronator Drift : pt refusing to cooperate for exam   Hoffmans : pt refusing to cooperate for exam     LABS:                        11.7   7.25  )-----------( 397      ( 24 Nov 2020 07:30 )             35.0     11-24    138  |  101  |  7<L>  ----------------------------<  143<H>  4.6   |  23  |  0.6<L>    Ca    8.9      24 Nov 2020 07:30  Mg     1.7     11-24    TPro  7.5  /  Alb  4.1  /  TBili  <0.2  /  DBili  x   /  AST  24  /  ALT  10  /  AlkPhos  93  11-24    PT/INR - ( 24 Nov 2020 07:30 )   PT: 10.30 sec;   INR: 0.90 ratio      PTT - ( 24 Nov 2020 07:30 )  PTT:30.7 sec    RADIOLOGY & ADDITIONAL STUDIES:  < from: CT Head No Cont (11.24.20 @ 08:06) >    IMPRESSION:    Minimal prominence in the thickness of the posterior falx which could reflect a tracemineralization versus a trace subdural hemorrhage. Short-term follow-up in 4 to 5 hours could be obtained to check for stability.    No other findings suspicious for intracranial hemorrhage or acute infarction.    Spoke with DORYS MORRISON on 11/24/2020 8:31 AM with readback.      KAELA WEBSTER M.D., ATTENDING RADIOLOGIST  This document has been electronically signed. Nov 24 2020  8:32AM    < end of copied text >    Assessment / Plan: Pt AOx3 denies any trauma / fall / head injury, HA, N/V or use of ac/ap.  Pt reports frequent Etoh use and requests "something for the shakes" refuses further exam. CTH indeterminate trace mineralization vs trace SDH, no neuro deficits on exam.   - no acute neurosurgical intervention indicated at this time   - obtain stat HCT if exam changes from current   - Will discuss with attending

## 2020-11-24 NOTE — ED ADULT NURSE NOTE - OBJECTIVE STATEMENT
55yr old male presents w/ CP that started this morning, unsure of time. midsternal stinging pain woke him up from sleep. 55yr old male presents w/ CP that started this morning, unsure of time. midsternal stinging pain woke him up from sleep. pt admits to drinking 2 beers this morning Contusion

## 2020-11-24 NOTE — ED ADULT NURSE NOTE - NSIMPLEMENTINTERV_GEN_ALL_ED
Implemented All Fall Risk Interventions:  [place] to call system. Call bell, personal items and telephone within reach. Instruct patient to call for assistance. Room bathroom lighting operational. Non-slip footwear when patient is off stretcher. Physically safe environment: no spills, clutter or unnecessary equipment. Stretcher in lowest position, wheels locked, appropriate side rails in place. Provide visual cue, wrist band, yellow gown, etc. Monitor gait and stability. Monitor for mental status changes and reorient to person, place, and time. Review medications for side effects contributing to fall risk. Reinforce activity limits and safety measures with patient and family.

## 2020-11-24 NOTE — ED PROVIDER NOTE - NS ED ROS FT
Eyes:  No visual changes, eye pain or discharge.  ENMT:  No hearing changes, pain, discharge or infections. No neck pain or stiffness.  Cardiac:  + chest pain, no SOB or edema.   Respiratory:  No cough or respiratory distress. No hemoptysis. No history of asthma or RAD.  GI:  No nausea, vomiting, diarrhea or abdominal pain.  :  No dysuria, frequency or burning.  MS:  No myalgia, muscle weakness, joint pain or back pain.  Neuro:  No headache or weakness.  No LOC.  Skin:  No skin rash.   Endocrine: No history of thyroid disease or diabetes.

## 2020-11-24 NOTE — H&P ADULT - ASSESSMENT
# Atypical chest pain      # Left upper lobe lung mass with possible right adrenal mets      # Possible subdural hematoma s/p mechanical fall      # Macrocytic anemia      # Active alcohol use disorder      DVT ppx:  GI ppx:  Diet:  Activity:  Ambulatory status:  Lines:  Code status:  Dispo: 55 years old homeless male with past medical history of left lung mass (diagnosed 2 years ago, but lost to follow up), former smoker (7 pack-years, quit 3 months ago), active alcohol use disorder, presented to ED with a complaint of chest pain x 1 day. Admitted for chest pain and mechanical fall.     # Chest pain  - DDx includes musculoskeletal, malignancy-related, ACS, atypical  - No PE on CT scan, D-dimer 1000  - EKG shows sinus rhythm, trop neg x 1  - Will repeat two more sets of cardiac markers  - Start ASA 81mg  - Check 2D ECHO  - Tylenol PRN for pain  - Cardiology evaluation    # Left upper lobe lung mass with possible right adrenal mets  - Was diagnosed 2 years ago but was never followed up; former smoker  - Endorses weight loss of 20lbs; no B-symptoms, no hemoptysis  - Will need tissue biopsy for diagnosis  - Oncology eval    # Possible subdural hematoma s/p mechanical fall  - CT head result noted, a bit drowsy on exam likely due to Librium  - Neurosx eval noted, no acute neurosurgical intervention  - Will repeat another CT at 5PM today to assess for stability  - CT head STAT if there is a change of mental status  - Neuro check q4hrs  - PT/OT  - Fall precaution    # Macrocytic anemia  - Possibly secondary to alcohol use, thiamine/folic deficiency  - Will check B12, folate  - Start thiamine, folic acid and multivitamin    # Active alcohol use disorder  - CIWA 5 with mild tremors, s/p one dose of Librium  - Will continue CIWA protocol with Librium taper, can give extra dose of Ativan for breakthough  - CATCH team evaluation      DVT ppx: Lovenox  GI ppx: Not indicated  Diet: Regular  Activity: Fall risk  Ambulatory status: Ambulate with cane  Lines: Peripheral IVs   Code status: FULL CODE  Dispo: acute 55 years old homeless male with past medical history of left lung mass (diagnosed 2 years ago, but lost to follow up), former smoker (7 pack-years, quit 3 months ago), active alcohol use disorder, presented to ED with a complaint of chest pain x 1 day. Admitted for chest pain and mechanical fall.     # Chest pain  - DDx includes musculoskeletal, malignancy-related, ACS, atypical  - No PE on CT scan, D-dimer 1000  - EKG shows sinus rhythm, trop neg x 1  - Will repeat two more sets of cardiac markers  - Start ASA 81mg  - Check 2D ECHO  - Tylenol PRN for pain  - Cardiology evaluation    # Left upper lobe lung mass with possible right adrenal mets  - Was diagnosed 2 years ago but was never followed up; former smoker  - Endorses weight loss of 20lbs; no B-symptoms, no hemoptysis  - Will need tissue biopsy for diagnosis  - Oncology eval    # Possible subdural hematoma s/p mechanical fall  - CT head result noted, a bit drowsy on exam likely due to Librium  - Neurosx eval noted, no acute neurosurgical intervention  - Will repeat another CT at 5PM today to assess for stability  - CT head STAT if there is a change of mental status  - Neuro check q4hrs  - PT/OT  - Fall precaution  - Discussed with NSX again, can start subQ Lovenox tomorrow if no change in repeat CT head and hemoglobin is stable    # Macrocytic anemia  - Possibly secondary to alcohol use, thiamine/folic deficiency  - Will check B12, folate  - Start thiamine, folic acid and multivitamin    # Active alcohol use disorder  - CIWA 5 with mild tremors, s/p one dose of Librium  - Will continue CIWA protocol with Librium taper, can give extra dose of Ativan for breakthough  - CATCH team evaluation      DVT ppx: Sequential for now  GI ppx: Not indicated  Diet: Regular  Activity: Fall risk  Ambulatory status: Ambulate with cane  Lines: Peripheral IVs   Code status: FULL CODE  Dispo: acute

## 2020-11-24 NOTE — H&P ADULT - NSHPLABSRESULTS_GEN_ALL_CORE
11.7   7.25  )-----------( 397      ( 24 Nov 2020 07:30 )             35.0       11-24    138  |  101  |  7<L>  ----------------------------<  143<H>  4.6   |  23  |  0.6<L>    Ca    8.9      24 Nov 2020 07:30  Mg     1.7     11-24    TPro  7.5  /  Alb  4.1  /  TBili  <0.2  /  DBili  x   /  AST  24  /  ALT  10  /  AlkPhos  93  11-24          PT/INR - ( 24 Nov 2020 07:30 )   PT: 10.30 sec;   INR: 0.90 ratio         PTT - ( 24 Nov 2020 07:30 )  PTT:30.7 sec    Lactate Trend      CARDIAC MARKERS ( 24 Nov 2020 07:30 )  x     / <0.01 ng/mL / x     / x     / x            CAPILLARY BLOOD GLUCOSE      POCT Blood Glucose.: 127 mg/dL (24 Nov 2020 07:52)      < from: CT Head No Cont (11.24.20 @ 08:06) >    Minimal prominence in the thickness of the posterior falx which could reflect a tracemineralization versus a trace subdural hemorrhage. Short-term follow-up in 4 to 5 hours could be obtained to check for stability.    No other findings suspicious for intracranial hemorrhage or acute infarction.    < end of copied text >    < from: Xray Chest 1 View- PORTABLE-Urgent (Xray Chest 1 View- PORTABLE-Urgent .) (11.24.20 @ 07:23) >    Left suprahilar opacity measuring approximately 3 cm. Further evaluation with chest CT is recommended to evaluate for the possibility of neoplasm    < end of copied text >    < from: CT Chest w/ IV Cont (11.24.20 @ 08:14) >    1.  Irregular 3.4 x 2.7 cm left upper lobe mass, highly suspicious for malignancy. Multiple bilateral pulmonary nodules, may represent metastases - no prior imaging is available for comparison.  2.  Irregularly marginated right adrenal mass, highly suspicious for metastasis.    < end of copied text >

## 2020-11-24 NOTE — ED PROVIDER NOTE - ATTENDING CONTRIBUTION TO CARE
54 y/o male with hx of lung mass, not being treated, unclear why, EtOH use, c/o chest pain. Reports pressure to left chest upon awakening today, moderate in intensity, non-radiating. No associated SOB, diaphoresis, N/V. When he walked to the bathroom he felt unsteady and fell. No LOC. No palpitations.  No recent travel/immobilization/surgery. No calf pain or swelling. No tearing sensation. No back pain.   O/E: Constitutional: Uncomfortable. Eyes: No pallor, no jaundice. Neck: Neck supple, no meningeal signs. Respiratory: Lungs CTA and equal b/l. Cardio: S1 S2 regular, no murmur. Equal pulses in all extremities. ABD: ABD soft, no tenderness. Extremities: No pedal edema, no calf tenderness. Mild tremors. Skin: No skin rash. Neuro: No neurologic deficits.   EKG: sinus tach, no acute ST/T changes, no right heart strain.   Imp: Ddx includes ACS, PE, no suggestion of aortic dissection. Mild EtOH withdrawal.  Treated with ASA, benzodiazepines.  Tn negative.  Chest CT with no PE, + metastatic lung cancer.  Spoke with patient about his metastatic cancer. It is unclear why he is not receiving treatment. Will not give any contact information for any family.  Will admit to telemetry for ACS rule out and further management of metastatic cancer.

## 2020-11-24 NOTE — CONSULT NOTE ADULT - ATTENDING COMMENTS
s/p Questionable fall. No complaints. Imaging - no traumatic injuries. Clear from Trauma. Dispo as per ED.

## 2020-11-24 NOTE — ED PROVIDER NOTE - CLINICAL SUMMARY MEDICAL DECISION MAKING FREE TEXT BOX
Presented for chest pain. EKG: sinus tach, no acute ST/T changes, no right heart strain.   Imp: Ddx includes ACS, PE, no suggestion of aortic dissection. Mild EtOH withdrawal.  Treated with ASA, benzodiazepines.  Tn negative.  Chest CT with no PE, + metastatic lung cancer.  Spoke with patient about his metastatic cancer. It is unclear why he is not receiving treatment. Will not give any contact information for any family.  Will admit to telemetry for ACS rule out and further management of metastatic cancer.

## 2020-11-24 NOTE — PHYSICAL THERAPY INITIAL EVALUATION ADULT - SPECIFY REASON(S)
Upon assessment , pt is independent with transfers and ambulation using a straight axis cane , will not require skilled PT at this time.

## 2020-11-24 NOTE — CHART NOTE - NSCHARTNOTEFT_GEN_A_CORE
Pt is a 55 year old male who presented to the ED with a c/o chest pain.  Pt declined assistance from [place].

## 2020-11-24 NOTE — ED PROVIDER NOTE - PHYSICAL EXAMINATION
CONSTITUTIONAL: laying in stretcher with mild tremors b/l extremities; in no acute distress.   SKIN: warm, dry  HEAD: Normocephalic; no skull indentations, no contusions or lacerations  EYES: no gross trauma bilaterally, no proptosis  ENT: No nasal discharge; airway clear. no septal hematoma or hemotypanum; no racoon eyes no amezquita sign.   NECK: no midline tenderness, normal ROM  CHEST: no crepitus or bruising  CARD: S1, S2 normal; no murmurs, gallops, or rubs. +tachycardic. regular rhythm.   RESP: No wheezes, rales or rhonchi.  ABD: soft ntnd  BACK: no midline tenderness or step offs  PELVIS: no laxity with lateral compression  EXT: no gross extremity injury. mild tremors b/l in upper extremities.   NEURO: gcs 15, moving all extremities grossly, following commands  PSYCH: Cooperative, no SI/HI. no auditory/visual hallucinations.

## 2020-11-24 NOTE — H&P ADULT - HISTORY OF PRESENT ILLNESS
55 years old homeless male with past medical history of left lung mass, active alcohol use disorder, presented to ED with a complaint of chest pain x 1 day. 55 years old homeless male with past medical history of left lung mass (diagnosed 2 years ago, but lost to follow up), former smoker (7 pack-years, quit 3 months ago), active alcohol use disorder, presented to ED with a complaint of chest pain x 1 day. Patient woke up this morning with left sided, sharp chest pain. He has never had this pain prior. It is described as sharp, localized, non-exertional, intermittent, 8/10 pain. The pain went away in 1 hour but is present during the interview. He also sustained mechanical fall due to leg weakness today as he tried to get up from bed. He fell and sustained head trauma, no LOC, no dizziness, no seizure-liked activity. He got back up on his own and came to ED for evaluation. He is a poor historian and non-compliant to medical follow up. He endorses 20lb weight loss in 1 month, but no cough, no hemoptysis, no shortness of breath. He also denies abdominal pain. He had 2-3 episodes of vomiting this morning and 2 days of non-bloody loose stool.

## 2020-11-25 LAB
ALBUMIN SERPL ELPH-MCNC: 3.4 G/DL — LOW (ref 3.5–5.2)
ALP SERPL-CCNC: 83 U/L — SIGNIFICANT CHANGE UP (ref 30–115)
ALT FLD-CCNC: 9 U/L — SIGNIFICANT CHANGE UP (ref 0–41)
ANION GAP SERPL CALC-SCNC: 11 MMOL/L — SIGNIFICANT CHANGE UP (ref 7–14)
AST SERPL-CCNC: 19 U/L — SIGNIFICANT CHANGE UP (ref 0–41)
BASOPHILS # BLD AUTO: 0.05 K/UL — SIGNIFICANT CHANGE UP (ref 0–0.2)
BASOPHILS NFR BLD AUTO: 0.9 % — SIGNIFICANT CHANGE UP (ref 0–1)
BILIRUB SERPL-MCNC: 0.4 MG/DL — SIGNIFICANT CHANGE UP (ref 0.2–1.2)
BUN SERPL-MCNC: 10 MG/DL — SIGNIFICANT CHANGE UP (ref 10–20)
CALCIUM SERPL-MCNC: 8.9 MG/DL — SIGNIFICANT CHANGE UP (ref 8.5–10.1)
CHLORIDE SERPL-SCNC: 101 MMOL/L — SIGNIFICANT CHANGE UP (ref 98–110)
CO2 SERPL-SCNC: 23 MMOL/L — SIGNIFICANT CHANGE UP (ref 17–32)
CREAT SERPL-MCNC: 0.7 MG/DL — SIGNIFICANT CHANGE UP (ref 0.7–1.5)
EOSINOPHIL # BLD AUTO: 0.14 K/UL — SIGNIFICANT CHANGE UP (ref 0–0.7)
EOSINOPHIL NFR BLD AUTO: 2.5 % — SIGNIFICANT CHANGE UP (ref 0–8)
FOLATE SERPL-MCNC: 11.2 NG/ML — SIGNIFICANT CHANGE UP
GLUCOSE SERPL-MCNC: 80 MG/DL — SIGNIFICANT CHANGE UP (ref 70–99)
HCT VFR BLD CALC: 36.1 % — LOW (ref 42–52)
HGB BLD-MCNC: 11.8 G/DL — LOW (ref 14–18)
IMM GRANULOCYTES NFR BLD AUTO: 0.2 % — SIGNIFICANT CHANGE UP (ref 0.1–0.3)
LYMPHOCYTES # BLD AUTO: 1.6 K/UL — SIGNIFICANT CHANGE UP (ref 1.2–3.4)
LYMPHOCYTES # BLD AUTO: 28.5 % — SIGNIFICANT CHANGE UP (ref 20.5–51.1)
MAGNESIUM SERPL-MCNC: 1.6 MG/DL — LOW (ref 1.8–2.4)
MCHC RBC-ENTMCNC: 32.4 PG — HIGH (ref 27–31)
MCHC RBC-ENTMCNC: 32.7 G/DL — SIGNIFICANT CHANGE UP (ref 32–37)
MCV RBC AUTO: 99.2 FL — HIGH (ref 80–94)
MONOCYTES # BLD AUTO: 0.56 K/UL — SIGNIFICANT CHANGE UP (ref 0.1–0.6)
MONOCYTES NFR BLD AUTO: 10 % — HIGH (ref 1.7–9.3)
NEUTROPHILS # BLD AUTO: 3.26 K/UL — SIGNIFICANT CHANGE UP (ref 1.4–6.5)
NEUTROPHILS NFR BLD AUTO: 57.9 % — SIGNIFICANT CHANGE UP (ref 42.2–75.2)
NRBC # BLD: 0 /100 WBCS — SIGNIFICANT CHANGE UP (ref 0–0)
PLATELET # BLD AUTO: 384 K/UL — SIGNIFICANT CHANGE UP (ref 130–400)
POTASSIUM SERPL-MCNC: 4.3 MMOL/L — SIGNIFICANT CHANGE UP (ref 3.5–5)
POTASSIUM SERPL-SCNC: 4.3 MMOL/L — SIGNIFICANT CHANGE UP (ref 3.5–5)
PROT SERPL-MCNC: 6.5 G/DL — SIGNIFICANT CHANGE UP (ref 6–8)
RBC # BLD: 3.64 M/UL — LOW (ref 4.7–6.1)
RBC # FLD: 14.4 % — SIGNIFICANT CHANGE UP (ref 11.5–14.5)
SODIUM SERPL-SCNC: 135 MMOL/L — SIGNIFICANT CHANGE UP (ref 135–146)
TROPONIN T SERPL-MCNC: <0.01 NG/ML — SIGNIFICANT CHANGE UP
VIT B12 SERPL-MCNC: 504 PG/ML — SIGNIFICANT CHANGE UP (ref 232–1245)
WBC # BLD: 5.62 K/UL — SIGNIFICANT CHANGE UP (ref 4.8–10.8)
WBC # FLD AUTO: 5.62 K/UL — SIGNIFICANT CHANGE UP (ref 4.8–10.8)

## 2020-11-25 PROCEDURE — 99233 SBSQ HOSP IP/OBS HIGH 50: CPT

## 2020-11-25 PROCEDURE — 93306 TTE W/DOPPLER COMPLETE: CPT | Mod: 26

## 2020-11-25 RX ORDER — ENOXAPARIN SODIUM 100 MG/ML
40 INJECTION SUBCUTANEOUS DAILY
Refills: 0 | Status: DISCONTINUED | OUTPATIENT
Start: 2020-11-25 | End: 2020-11-26

## 2020-11-25 RX ORDER — MAGNESIUM SULFATE 500 MG/ML
2 VIAL (ML) INJECTION
Refills: 0 | Status: COMPLETED | OUTPATIENT
Start: 2020-11-25 | End: 2020-11-25

## 2020-11-25 RX ADMIN — ENOXAPARIN SODIUM 40 MILLIGRAM(S): 100 INJECTION SUBCUTANEOUS at 23:45

## 2020-11-25 RX ADMIN — Medication 50 GRAM(S): at 12:06

## 2020-11-25 RX ADMIN — Medication 20 MILLIGRAM(S): at 18:17

## 2020-11-25 RX ADMIN — Medication 25 MILLIGRAM(S): at 10:51

## 2020-11-25 RX ADMIN — Medication 650 MILLIGRAM(S): at 12:07

## 2020-11-25 RX ADMIN — Medication 20 MILLIGRAM(S): at 22:18

## 2020-11-25 RX ADMIN — Medication 25 MILLIGRAM(S): at 05:49

## 2020-11-25 RX ADMIN — Medication 650 MILLIGRAM(S): at 06:45

## 2020-11-25 RX ADMIN — Medication 650 MILLIGRAM(S): at 05:49

## 2020-11-25 RX ADMIN — Medication 20 MILLIGRAM(S): at 15:21

## 2020-11-25 RX ADMIN — Medication 100 MILLIGRAM(S): at 11:24

## 2020-11-25 RX ADMIN — Medication 1 TABLET(S): at 11:24

## 2020-11-25 RX ADMIN — Medication 81 MILLIGRAM(S): at 11:24

## 2020-11-25 RX ADMIN — Medication 650 MILLIGRAM(S): at 12:37

## 2020-11-25 RX ADMIN — Medication 50 GRAM(S): at 10:51

## 2020-11-25 RX ADMIN — Medication 1 MILLIGRAM(S): at 11:24

## 2020-11-25 NOTE — OCCUPATIONAL THERAPY INITIAL EVALUATION ADULT - ADDITIONAL COMMENTS
Pt reports that he was temporarily staying at a friends apartment but that they are no longer friends and he has no where to go. Pt reports he has siblings and an aunt that live in [place] but does not have the money to travel there. Pt verbalized that he has been drinking alcohol daily and as much as possible but would like to stop because he knows it doesn't help him.

## 2020-11-25 NOTE — CONSULT NOTE ADULT - ASSESSMENT
Assessment:  MARCIAL DUNLAP is a 55y old -American Male, undomiciled, unemployed, with no known psychiatric history, history of alcohol use disorder, PMH of left lung mass, who was admitted for chest pain and fall. Addiction medicine was consulted for alcohol abuse.  On evaluation, patient appears to have chronic alcohol use disorder and remains at risk of severe alcohol withdrawal, however withdrawal symptoms appear to be currently well-controlled with librium taper. Would prefer additional librium PRN rather than ativan PRN in case of breakthrough withdrawals in order to avoid mixing benzodiazepines unnecessarily.      Recommendations:  -continue ongoing librium taper  -for breakthrough objective signs of alcohol withdrawal with CIWA score >8, can give additional librium 5mg q4h prn between scheduled taper doses  -CATCH team following patient for possible chemical dependency treatment programs on discharge
Non cardiac, atypical chest pain in the same area that has a large pulmonary mass, possibly metastatic to adrenal  No evidence of ACS, Alcoholic, at the risk of DT  Paroxysmal Atrial flutter ( ECG on July), then keep on ASA 81 mg for that matter not chest pain  Acceptable echo, ECG cardiac enzyme  Needs cancer approach and management as well as DT protocol    DT protocol already in place  Oncology and pulmonary consult for the cancer  After withdrawal & possible DT management, when is stable possibly needs bronchoscopy, biopsy, and CT scan or PET scan?  No further cardiac work up  keep on telemetry until DT is ruled out or resolved.  
ASSESSMENT:  The patient is a 55 year old male with a history of alcohol abuse s/p ?fall, ?HT, -LOC, -AC. Pt denies pain to head, neck, chest, extremities. Pt denies CP, SOB, n/v/d, fever, chills, urinary symptoms.    PLAN:    Rpt HCT as per nsgy team   - no acute neurosurgical intervention indicated at this time   - obtain stat HCT if exam changes from current   Consider CT A/P in the setting of possible metastatic dz  No acute trauma surgical intervention at this time

## 2020-11-25 NOTE — OCCUPATIONAL THERAPY INITIAL EVALUATION ADULT - VISUAL ASSESSMENT: TRACKING
Pt unable to fully track to left and right visual fields. Pt c/o strain when attempt made to track to end range in any direction. Pt reports blurry vision as well.

## 2020-11-25 NOTE — PROGRESS NOTE ADULT - ASSESSMENT
55 years old homeless male with past medical history of left lung mass (diagnosed 2 years ago, but lost to follow up), former smoker (7 pack-years, quit 3 months ago), active alcohol use disorder, presented to ED with constant sharp cp for the last 2 days. He also sustained mechanical fall due to leg weakness as he tried to get up from bed. He had 2-3 episodes of vomiting this morning and 2 days of non-bloody loose stool.       CP likely musculoskeletal  S/P Fall  ETOH abuse   Lung cancer          PLAN:    ·	CE x 4 negative  ·	Pt's cp is constant and chest is tender to palpate. Likely musculoskeletal  ·	ECHO  ·	Monitor on tele for 24 hrs  ·	Check CIWA score and watch for withdrawal  ·	Will call IR for lung biopsy if pt agrees.  ·	D/C Librium   55 years old homeless male with past medical history of left lung mass (diagnosed 2 years ago, but lost to follow up), former smoker (7 pack-years, quit 3 months ago), active alcohol use disorder, presented to ED with constant sharp cp for the last 2 days. He also sustained mechanical fall due to leg weakness as he tried to get up from bed. He had 2-3 episodes of vomiting this morning and 2 days of non-bloody loose stool.       CP likely musculoskeletal  S/P Fall  ETOH abuse   Lung cancer          PLAN:    ·	CE x 4 negative  ·	Pt's cp is constant and chest is tender to palpate. Likely musculoskeletal  ·	ECHO  ·	CT head unremarkable.   ·	Monitor on tele for 24 hrs  ·	Check CIWA score and watch for withdrawal  ·	Will call IR for lung biopsy if pt agrees.  ·	D/C Librium  ·	Ativan 2 mg ivp prn for restless and agitation.

## 2020-11-25 NOTE — OCCUPATIONAL THERAPY INITIAL EVALUATION ADULT - SPECIFY REASON(S)
Attempt made to see pt for OT eval. Pt currently declining OT eval stating that he is too tired. OT agreed to return at later time 2* pt request. Will follow up.

## 2020-11-25 NOTE — OCCUPATIONAL THERAPY INITIAL EVALUATION ADULT - GENERAL OBSERVATIONS, REHAB EVAL
OT eval: 10:45-11:15 Pt received semi alamo in bed in NAD. Pt agreeable to OT eval following min verbal cues of motivation.

## 2020-11-25 NOTE — CONSULT NOTE ADULT - SUBJECTIVE AND OBJECTIVE BOX
Patient is a 55y old  Male who presents with a chief complaint of chest pain (25 Nov 2020 17:15)      HPI:  Cardiology service on call was called to see the patient for the chest pain in the left upper chest, kind of persistent pain, got a xray and chest CT scan and clearly a quite sizable tumor was seen in the VISHNU, troponin was negative, Monitoring was NSR, and ECG was abnormal for the right axis deviation nd high voltage, possibly LVH, pressure overload, no evidence of ACS, MI, or unstable angina.  He is alcoholic, homeless, poor health and follow up, first time i saw him.  No cardiac chest pain, no SOB at rest, no distress, no palpitation or edema.  Hx as below:    55 years old homeless male with past medical history of left lung mass (diagnosed 2 years ago, but lost to follow up), former smoker (7 pack-years, quit 3 months ago), active alcohol use disorder, presented to ED with a complaint of chest pain x 1 day. Patient woke up this morning with left sided, sharp chest pain. He has never had this pain prior. It is described as sharp, localized, non-exertional, intermittent, 8/10 pain. The pain went away in 1 hour but is present during the interview. He also sustained mechanical fall due to leg weakness today as he tried to get up from bed. He fell and sustained head trauma, no LOC, no dizziness, no seizure-liked activity. He got back up on his own and came to ED for evaluation. He is a poor historian and non-compliant to medical follow up. He endorses 20lb weight loss in 1 month, but no cough, no hemoptysis, no shortness of breath. He also denies abdominal pain. He had 2-3 episodes of vomiting this morning and 2 days of non-bloody loose stool.  (24 Nov 2020 12:10)      PAST MEDICAL & SURGICAL HISTORY:  Severe alcohol use disorder    Mass of left lung    Lung cancer    Alcoholism /alcohol abuse    No significant past surgical history      PREVIOUS DIAGNOSTIC TESTING:      ECHO  FINDINGS: < from: TTE Echo Complete w/o Contrast w/ Doppler (11.25.20 @ 16:12) >   1. LV Ejection Fraction by Dobson's Method with a biplane EF of 62 %.   2. Spectral Doppler shows impaired relaxation pattern of left ventricular myocardial filling (Grade I diastolic dysfunction).   3. Normal left atrial size.   4. Normal right atrial size.   5. Mild mitral valve regurgitation.   6. Thickening of the anterior and posterior mitral valve leaflets.   7. Mild tricuspid regurgitation.    < end of copied text >      MEDICATIONS  (STANDING):  aspirin  chewable 81 milliGRAM(s) Oral daily  chlordiazePOXIDE 20 milliGRAM(s) Oral every 4 hours  chlordiazePOXIDE   Oral   chlorhexidine 4% Liquid 1 Application(s) Topical <User Schedule>  folic acid 1 milliGRAM(s) Oral daily  multivitamin 1 Tablet(s) Oral daily  thiamine 100 milliGRAM(s) Oral daily    MEDICATIONS  (PRN):  acetaminophen   Tablet .. 650 milliGRAM(s) Oral every 6 hours PRN Mild Pain (1 - 3)  senna 2 Tablet(s) Oral at bedtime PRN Constipation      FAMILY HISTORY:  No pertinent family history in first degree relatives          SOCIAL HISTORY:  CIGARETTES: ex smoker  ALCOHOL: alcoholic  DRUGS: not clear                      REVIEW OF SYSTEMS:  CONSTITUTIONAL: No distress, Looks stable  NECK: No pain  RESPIRATORY: Occasional cough, not new, No wheezing, shortness of breath  CARDIOVASCULAR: Initial chest pain, no SOB, limited activity, no palpitations, leg swelling  GASTROINTESTINAL: No abdominal or epigastric pain. No nausea, vomiting,  NEUROLOGICAL: No dizziness, headaches  SKIN: No itching, burning, rashes, or lesions   ENDOCRINE: No heat or cold intolerance  MUSCULOSKELETAL: No joint pain, No  swelling  PSYCHIATRIC: possible depression, anxiety  ALLERGY: No hives, itching, rash          Vital Signs Last 24 Hrs  T(C): 36.2 (25 Nov 2020 13:13), Max: 36.2 (25 Nov 2020 13:13)  T(F): 97.2 (25 Nov 2020 13:13), Max: 97.2 (25 Nov 2020 13:13)  HR: 95 (25 Nov 2020 13:13) (77 - 99)  BP: 134/77 (25 Nov 2020 13:13) (127/60 - 134/77)  BP(mean): --  RR: 19 (25 Nov 2020 13:13) (19 - 19)  SpO2: 100% (24 Nov 2020 19:49) (100% - 100%)                      PHYSICAL EXAM:  GENERAL: No distress  HEAD:  Atraumatic, Normocephalic  NECK: Supple, No JVD, No Bruit  NERVOUS SYSTEM:  Alert, Awake, Oriented to place, person; Adequate memory and speech; Normal motor Strength in upper extremity, lower did not cooperate  CHEST/LUNG: Normal air entry to lung base bilaterally; No wheeze, crackle, rales, rhonchi  HEART: Regular heart beat, S1, A2, P2, No S3, No gallop, No murmur  ABDOMEN: Soft, Non tender, Non distended; Bowel sounds present  EXTREMITIES:  2+ Peripheral Pulses, No clubbing, No edema  SKIN: No rashes or lesions    TELEMETRY: NSR    ECG: < from: 12 Lead ECG (11.24.20 @ 08:28) >  Normal sinus rhythm  Rightward axis  Nonspecific T wave abnormality    < end of copied text >  < from: 12 Lead ECG (11.24.20 @ 06:15) >  Normal sinus rhythm  Minimal voltage criteria for LVH, may be normal variant ( Sokolow-Agrawal )  Borderline ECG    < end of copied text >      I&O's Detail    25 Nov 2020 07:01  -  25 Nov 2020 18:28  --------------------------------------------------------  IN:    Oral Fluid: 800 mL  Total IN: 800 mL    OUT:  Total OUT: 0 mL    Total NET: 800 mL          LABS:                        11.8   5.62  )-----------( 384      ( 25 Nov 2020 06:56 )             36.1     11-25    135  |  101  |  10  ----------------------------<  80  4.3   |  23  |  0.7    Ca    8.9      25 Nov 2020 06:56  Mg     1.6     11-25    TPro  6.5  /  Alb  3.4<L>  /  TBili  0.4  /  DBili  x   /  AST  19  /  ALT  9   /  AlkPhos  83  11-25    CARDIAC MARKERS ( 25 Nov 2020 00:26 )  x     / <0.01 ng/mL / x     / x     / x      CARDIAC MARKERS ( 24 Nov 2020 21:33 )  x     / <0.01 ng/mL / x     / x     / x      CARDIAC MARKERS ( 24 Nov 2020 07:30 )  x     / <0.01 ng/mL / x     / x     / x          PT/INR - ( 24 Nov 2020 07:30 )   PT: 10.30 sec;   INR: 0.90 ratio         PTT - ( 24 Nov 2020 07:30 )  PTT:30.7 sec    I&O's Summary    25 Nov 2020 07:01  -  25 Nov 2020 18:28  --------------------------------------------------------  IN: 800 mL / OUT: 0 mL / NET: 800 mL        RADIOLOGY & ADDITIONAL STUDIES: < from: Xray Chest 1 View- PORTABLE-Urgent (Xray Chest 1 View- PORTABLE-Urgent .) (11.24.20 @ 07:23) >  Left suprahilar opacity measuring approximately 3 cm. Further evaluation with chest CT is recommended to evaluate for the possibility of neoplasm      < end of copied text >  < from: CT Head No Cont (11.24.20 @ 14:47) >  In comparison with the prior noncontrast CT scan of the brain dated November 24, 2020 time8:01 AM:    The previously described minimal prominence of the posterior falx is not appreciated on the current examination and may reflect resolution of a small subdural hemorrhage.    No CT evidence of acute intracranial hemorrhage.      < end of copied text >  < from: CT Chest w/ IV Cont (11.24.20 @ 08:14) >  1.  Irregular 3.4 x 2.7 cm left upper lobe mass, highly suspicious for malignancy. Multiple bilateral pulmonary nodules, may represent metastases - no prior imaging is available for comparison.  2.  Irregularly marginated right adrenal mass, highly suspicious for metastasis.    Findings were discussed with Dr. Quinn on 11/24/2020 at 9:05 AM.      < end of copied text >

## 2020-11-25 NOTE — CONSULT NOTE ADULT - SUBJECTIVE AND OBJECTIVE BOX
Subjective:    HPI:  MARCIAL DUNLAP is a 55y old -American Male, undomiciled, unemployed, with no known psychiatric history, history of alcohol use disorder, PMH of left lung mass, who was admitted for chest pain and fall. Addiction medicine was consulted for alcohol abuse.  On approach patient resting in bed comfortably, about to eat dinner reporting he is hungry, irritable throughout interview. Patient reports that he is still having feelings of alcohol withdrawals but that they are better now than previously. Reports some feelings of anxiety however feels that they are improving. Reports continued shaking in arms however also improving. Denies visual disturbances other than some light sensitivity, no visual hallucinations. Denies SI, HI, or auditory hallucinations.  Patient reports last drink was 2 beers 2 days ago, however prior to this usually drinks 1 case of beers (24 beers) per day, has been doing so consistently for past 20 years with few interruptions. Denies any other substance use. Reports he does not know yet if he wants to attend rehab after discharge.        Substance Use History:  Alcohol: drinks 1 case of beer/day for past 20 years with few interruptions, last drink 2 days ago. 1 prior rehab 2 years ago, stayed sober for 3 months afterwards. Reports 2 prior detoxes. Denies any prior seizures or DTs.  Cannabis: tried cannabis as a teenager, denies use since age 17  Nicotine: smoked cigarettes but quit 3 months ago, 7 pack-year history    Past Psychiatric History:  denies    Family History:  denies    Social History:  undomiciled for past 1 month, prior to this was living with a friend, unemployed, obtains income from       Objective:    Vitals:  Vital Signs Last 24 Hrs  T(C): 36.2 (25 Nov 2020 13:13), Max: 36.2 (25 Nov 2020 13:13)  T(F): 97.2 (25 Nov 2020 13:13), Max: 97.2 (25 Nov 2020 13:13)  HR: 95 (25 Nov 2020 13:13) (77 - 99)  BP: 134/77 (25 Nov 2020 13:13) (127/60 - 134/77)  BP(mean): --  RR: 19 (25 Nov 2020 13:13) (19 - 19)  SpO2: 100% (24 Nov 2020 19:49) (100% - 100%)    Labs:                        11.8   5.62  )-----------( 384      ( 25 Nov 2020 06:56 )             36.1     11-25    135  |  101  |  10  ----------------------------<  80  4.3   |  23  |  0.7    Ca    8.9      25 Nov 2020 06:56  Mg     1.6     11-25    TPro  6.5  /  Alb  3.4<L>  /  TBili  0.4  /  DBili  x   /  AST  19  /  ALT  9   /  AlkPhos  83  11-25            Mental Status Exam:   Appearance: well-appearing, appears stated age, good hygiene and grooming  Behavior: calm, cooperative, good eye contact  Speech: regular volume, rate, and prosody  Reported Mood: "not too good"  Affect: irritable  Thought process: linear, goal-directed  Thought Content: no suicidal ideation, no homicidal ideation, no prominent delusions  Perceptions: no auditory or visual hallucinations  Memory: good recent and remote memory  Orientation: alert and oriented to person, place, time, and situation  Insight: good  Judgement: good    CIWA-Ar score 5 for tremors, anxiety, and mild light sensitivity.

## 2020-11-25 NOTE — CONSULT NOTE ADULT - SUBJECTIVE AND OBJECTIVE BOX
INTERVENTIONAL RADIOLOGY CONSULT:     Procedure Requested: lung mass biopsy    HPI:  55 years old homeless male with past medical history of left lung mass (diagnosed 2 years ago, but lost to follow up), former smoker (7 pack-years, quit 3 months ago), active alcohol use disorder, presented to ED with a complaint of chest pain x 1 day. Patient woke up this morning with left sided, sharp chest pain. He has never had this pain prior. It is described as sharp, localized, non-exertional, intermittent, 8/10 pain. The pain went away in 1 hour but is present during the interview. He also sustained mechanical fall due to leg weakness today as he tried to get up from bed. He fell and sustained head trauma, no LOC, no dizziness, no seizure-liked activity. He got back up on his own and came to ED for evaluation. He is a poor historian and non-compliant to medical follow up. He endorses 20lb weight loss in 1 month, but no cough, no hemoptysis, no shortness of breath. He also denies abdominal pain. He had 2-3 episodes of vomiting this morning and 2 days of non-bloody loose stool.  (24 Nov 2020 12:10)      PAST MEDICAL & SURGICAL HISTORY:  Severe alcohol use disorder  Mass of left lung  Lung cancer  Alcoholism /alcohol abuse  No significant past surgical history  No significant past surgical history        MEDICATIONS  (STANDING):  aspirin  chewable 81 milliGRAM(s) Oral daily  chlordiazePOXIDE 20 milliGRAM(s) Oral every 4 hours  chlordiazePOXIDE   Oral   chlorhexidine 4% Liquid 1 Application(s) Topical <User Schedule>  folic acid 1 milliGRAM(s) Oral daily  multivitamin 1 Tablet(s) Oral daily  thiamine 100 milliGRAM(s) Oral daily    MEDICATIONS  (PRN):  acetaminophen   Tablet .. 650 milliGRAM(s) Oral every 6 hours PRN Mild Pain (1 - 3)  senna 2 Tablet(s) Oral at bedtime PRN Constipation      Allergies    No Known Allergies    Intolerances          FAMILY HISTORY:  No pertinent family history in first degree relatives    No pertinent family history in first degree relatives  cad        Physical Exam:   Vital Signs Last 24 Hrs  T(C): 36.2 (25 Nov 2020 13:13), Max: 36.8 (24 Nov 2020 17:19)  T(F): 97.2 (25 Nov 2020 13:13), Max: 98.3 (24 Nov 2020 17:19)  HR: 95 (25 Nov 2020 13:13) (77 - 100)  BP: 134/77 (25 Nov 2020 13:13) (127/60 - 147/76)  BP(mean): --  RR: 19 (25 Nov 2020 13:13) (19 - 20)  SpO2: 100% (24 Nov 2020 19:49) (100% - 100%)       Labs:                         11.8   5.62  )-----------( 384      ( 25 Nov 2020 06:56 )             36.1     11-25    135  |  101  |  10  ----------------------------<  80  4.3   |  23  |  0.7    Ca    8.9      25 Nov 2020 06:56  Mg     1.6     11-25    TPro  6.5  /  Alb  3.4<L>  /  TBili  0.4  /  DBili  x   /  AST  19  /  ALT  9   /  AlkPhos  83  11-25    PT/INR - ( 24 Nov 2020 07:30 )   PT: 10.30 sec;   INR: 0.90 ratio         PTT - ( 24 Nov 2020 07:30 )  PTT:30.7 sec      Radiology & Additional Studies:     Radiology imaging reviewed.       ASSESSMENT/ PLAN:   HPI:  55 years old homeless male with past medical history of left lung mass (diagnosed 2 years ago, but lost to follow up), former smoker (7 pack-years, quit 3 months ago), active alcohol use disorder, presented to ED with a complaint of chest pain x 1 day. IR consulted for biopsy of perihilar mass.  - Please order PET scan before attempt is being made for perihilar mass biopsy.    Thank you for the courtesy of this consult, please call [phone] with any further questions.

## 2020-11-25 NOTE — CHART NOTE - NSCHARTNOTEFT_GEN_A_CORE
Trauma tertiary Survey    Patient seen and examined. CT head on trauma work up in ED was significant for trace mineralization vs SDH. Neurosurgery evaluated the patient and admitte    PHYSICAL EXAM:  Craniofacial: Atraumatic, No deformity  Eyes: Pupil Size equal B/L and RTL. EOMI  Oropharynx: Atraumatic  Neck: Non-tender, No deformity, Trachea midline.  Chest: Equal breath sounds, non-tender, No deformity or crepitus  Heart: RSR, No murmurs, no rubs  Abdomen: Atraumatic, Non-tender, non-distended. No hepatosplenomegaly  Pelvis: Stable, non-tender, no deformity  : Atraumatic, no blood at the meatus or priapism  Back: Spine non-tender, Atraumatic  Extremities: Atraumatic, no deformities, normal pulses, moving all extremities   Neurologic: CN intact, Motor intact throughout. Sensation intact/normal throughout.  Psych: Mood and affect normal. Judgment and insight normal    CAGE SUBSTANCE ABUSE SCREENING TOOL:  1.	Have you ever felt you should cut down on your drinking?   [  ] YES = 1      [  ] NO = 0  2.	Have people annoyed you by criticizing your drinking?    [  ] YES = 1      [  ] NO = 0  3.	Have you ever felt bad or guilty about your drinking?   [  ] YES = 1      [  ] NO = 0  4.	Have you ever had a drink first thing in the morning to steady your nerves or to get rid of a hangover (eye-opener)?    [  ] YES = 1      [  ] NO = 0    Total =     [  ] Score is two or greater which is considered clinically significant, social work consult will be placed.   [  ] Score is not two or greater which is not considered clinically significant, social work consult not warranted at this time.      All images/reports reviewed. No further traumatic work-up warranted. Trauma tertiary Survey    Patient seen and examined. CT head on trauma work up in ED was significant for trace mineralization vs SDH. Neurosurgery evaluated the patient, stated there is no neurosurgery intervention, no keppra, only repeat head CT if change in mental status. The patient had multiple medical comorbidities that outweighed trauma burden, admitted to medicine for medical management. Today reports no new pain, reports tingling/numbness in feet that is not new. No spinal fractures noted on trauma work up.     PHYSICAL EXAM:  Craniofacial: Atraumatic, No deformity  Eyes: Pupil Size equal B/L and RTL. EOMI  Oropharynx: Atraumatic  Neck: Non-tender, No deformity, Trachea midline.  Chest: Equal breath sounds, non-tender, No deformity or crepitus  Heart: RSR, No murmurs, no rubs  Abdomen: Atraumatic, Non-tender, non-distended. No hepatosplenomegaly  Pelvis: Stable, non-tender, no deformity  Back: Spine non-tender, Atraumatic  Extremities: Atraumatic, no deformities, normal pulses, moving all extremities   Neurologic: CN intact, Motor intact throughout. Sensation intact/normal throughout.  Psych: Mood and affect normal. Judgment and insight normal    CAGE SUBSTANCE ABUSE SCREENING TOOL:  1.	Have you ever felt you should cut down on your drinking?   [  x] YES = 1      [  ] NO = 0  2.	Have people annoyed you by criticizing your drinking?    [  x] YES = 1      [  ] NO = 0  3.	Have you ever felt bad or guilty about your drinking?   [ x ] YES = 1      [  ] NO = 0  4.	Have you ever had a drink first thing in the morning to steady your nerves or to get rid of a hangover (eye-opener)?    [ x ] YES = 1      [  ] NO = 0    Total = 4    [x  ] Score is two or greater which is considered clinically significant, social work consult will be placed.   [  ] Score is not two or greater which is not considered clinically significant, social work consult not warranted at this time.      All images/reports reviewed. No further traumatic work-up warranted. Consider SBIRT consult for etoh abuse history. Recall Trauma team at [phone] if needed.

## 2020-11-25 NOTE — OCCUPATIONAL THERAPY INITIAL EVALUATION ADULT - COGNITIVE, VISUAL PERCEPTUAL, OT EVAL
To educate pt on compensatory strategies to increased safety during functional mobility and tasks 2* blurred vision with decreased tracking abilities.

## 2020-11-25 NOTE — PROGRESS NOTE ADULT - SUBJECTIVE AND OBJECTIVE BOX
SUBJECTIVE:  Patient is a 55y old Male who presents with a chief complaint of chest pain (25 Nov 2020 14:12)   Currently admitted to medicine with the primary diagnosis of Chest pain     Today is hospital day 1d. There are no new issues or overnight events.       HPI  HPI:  55 years old homeless male with past medical history of left lung mass (diagnosed 2 years ago, but lost to follow up), former smoker (7 pack-years, quit 3 months ago), active alcohol use disorder, presented to ED with a complaint of chest pain x 1 day. Patient woke up this morning with left sided, sharp chest pain. He has never had this pain prior. It is described as sharp, localized, non-exertional, intermittent, 8/10 pain. The pain went away in 1 hour but is present during the interview. He also sustained mechanical fall due to leg weakness today as he tried to get up from bed. He fell and sustained head trauma, no LOC, no dizziness, no seizure-liked activity. He got back up on his own and came to ED for evaluation. He is a poor historian and non-compliant to medical follow up. He endorses 20lb weight loss in 1 month, but no cough, no hemoptysis, no shortness of breath. He also denies abdominal pain. He had 2-3 episodes of vomiting this morning and 2 days of non-bloody loose stool.  (24 Nov 2020 12:10)      PAST MEDICAL & SURGICAL HISTORY  Severe alcohol use disorder    Mass of left lung    Lung cancer    Alcoholism /alcohol abuse    No significant past surgical history    No significant past surgical history      SOCIAL HISTORY:  Negative for smoking/alcohol/drug use.     ALLERGIES:  No Known Allergies    MEDICATIONS:  HOME MEDICATIONS    STANDING MEDICATIONS  aspirin  chewable 81 milliGRAM(s) Oral daily  chlordiazePOXIDE 20 milliGRAM(s) Oral every 4 hours  chlordiazePOXIDE   Oral   chlorhexidine 4% Liquid 1 Application(s) Topical <User Schedule>  folic acid 1 milliGRAM(s) Oral daily  multivitamin 1 Tablet(s) Oral daily  thiamine 100 milliGRAM(s) Oral daily    PRN MEDICATIONS  acetaminophen   Tablet .. 650 milliGRAM(s) Oral every 6 hours PRN  senna 2 Tablet(s) Oral at bedtime PRN    VITALS:   ICU Vital Signs Last 24 Hrs  T(C): 36.2 (25 Nov 2020 13:13), Max: 37.2 (24 Nov 2020 15:36)  T(F): 97.2 (25 Nov 2020 13:13), Max: 98.9 (24 Nov 2020 15:36)  HR: 95 (25 Nov 2020 13:13) (77 - 100)  BP: 134/77 (25 Nov 2020 13:13) (123/76 - 147/76)  BP(mean): --  ABP: --  ABP(mean): --  RR: 19 (25 Nov 2020 13:13) (18 - 20)  SpO2: 100% (24 Nov 2020 19:49) (97% - 100%)    LABS:                        11.8   5.62  )-----------( 384      ( 25 Nov 2020 06:56 )             36.1     11-25    135  |  101  |  10  ----------------------------<  80  4.3   |  23  |  0.7    Ca    8.9      25 Nov 2020 06:56  Mg     1.6     11-25    TPro  6.5  /  Alb  3.4<L>  /  TBili  0.4  /  DBili  x   /  AST  19  /  ALT  9   /  AlkPhos  83  11-25    PT/INR - ( 24 Nov 2020 07:30 )   PT: 10.30 sec;   INR: 0.90 ratio         PTT - ( 24 Nov 2020 07:30 )  PTT:30.7 sec    I&O's Detail    25 Nov 2020 07:01  -  25 Nov 2020 15:23  --------------------------------------------------------  IN:    Oral Fluid: 800 mL  Total IN: 800 mL    OUT:  Total OUT: 0 mL    Total NET: 800 mL    Troponin T, Serum: <0.01 ng/mL (11-25-20 @ 00:26)  Troponin T, Serum: <0.01 ng/mL (11-24-20 @ 21:33)    CARDIAC MARKERS ( 25 Nov 2020 00:26 )  x     / <0.01 ng/mL / x     / x     / x      CARDIAC MARKERS ( 24 Nov 2020 21:33 )  x     / <0.01 ng/mL / x     / x     / x      CARDIAC MARKERS ( 24 Nov 2020 07:30 )  x     / <0.01 ng/mL / x     / x     / x        PHYSICAL EXAM:  GEN: No acute distress  HEENT: Normocephalic  NECK: Supple  LUNGS: Decreased breathe sounds  HEART: Regular. Sternal pain with palpation  ABD: Soft, non-distended. Tender to light palpation  EXT: NE/2+PP  NEURO: AAOX3  PSYCH: Appropriate mood, responds appropriately

## 2020-11-25 NOTE — PROGRESS NOTE ADULT - SUBJECTIVE AND OBJECTIVE BOX
GERMÁNMARCIAL  55y Male    CHIEF COMPLAINT:    Patient is a 55y old  Male who presents with a chief complaint of chest pain (24 Nov 2020 12:10)      INTERVAL HPI/OVERNIGHT EVENTS:    Patient seen and examined.    ROS: All other systems are negative.    Vital Signs:    T(F): 97.2 (11-25-20 @ 13:13), Max: 98.9 (11-24-20 @ 15:36)  HR: 95 (11-25-20 @ 13:13) (77 - 100)  BP: 134/77 (11-25-20 @ 13:13) (123/76 - 147/76)  RR: 19 (11-25-20 @ 13:13) (18 - 20)  SpO2: 100% (11-24-20 @ 19:49) (97% - 100%)  I&O's Summary    25 Nov 2020 07:01  -  25 Nov 2020 14:12  --------------------------------------------------------  IN: 300 mL / OUT: 0 mL / NET: 300 mL      Daily     Daily   CAPILLARY BLOOD GLUCOSE          PHYSICAL EXAM:    GENERAL:  NAD  SKIN: No rashes or lesions  HENT: Atrumatic. Normocephalic. PERRL. Moist membranes.  NECK: Supple, No JVD. No lymphadenopathy.  PULMONARY: CTA B/L. No wheezing. No rales  CVS: Normal S1, S2. Rate and Rythm are regular. No murmurs.  ABDOMEN/GI: Soft, Nontender, Nondistended; BS present  EXTREMITIES: Peripheral pulses intact. No edema B/L LE.  NEUROLOGIC:  No motor or sensory deficit.  PSYCH: Alert & oriented x 3    Consultant(s) Notes Reviewed:  [x ] YES  [ ] NO  Care Discussed with Consultants/Other Providers [ x] YES  [ ] NO    EKG reviewed  Telemetry reviewed    LABS:                        11.8   5.62  )-----------( 384      ( 25 Nov 2020 06:56 )             36.1     11-25    135  |  101  |  10  ----------------------------<  80  4.3   |  23  |  0.7    Ca    8.9      25 Nov 2020 06:56  Mg     1.6     11-25    TPro  6.5  /  Alb  3.4<L>  /  TBili  0.4  /  DBili  x   /  AST  19  /  ALT  9   /  AlkPhos  83  11-25    PT/INR - ( 24 Nov 2020 07:30 )   PT: 10.30 sec;   INR: 0.90 ratio         PTT - ( 24 Nov 2020 07:30 )  PTT:30.7 sec  Serum Pro-Brain Natriuretic Peptide: 74 pg/mL (11-24-20 @ 07:30)    Trop <0.01, CKMB --, CK --, 11-25-20 @ 00:26  Trop <0.01, CKMB --, CK --, 11-24-20 @ 21:33  Trop <0.01, CKMB --, CK --, 11-24-20 @ 07:30        RADIOLOGY & ADDITIONAL TESTS:      Imaging or report Personally Reviewed:  [ ] YES  [ ] NO    Medications:  Standing  aspirin  chewable 81 milliGRAM(s) Oral daily  chlordiazePOXIDE 20 milliGRAM(s) Oral every 4 hours  chlordiazePOXIDE   Oral   chlorhexidine 4% Liquid 1 Application(s) Topical <User Schedule>  folic acid 1 milliGRAM(s) Oral daily  multivitamin 1 Tablet(s) Oral daily  thiamine 100 milliGRAM(s) Oral daily    PRN Meds  acetaminophen   Tablet .. 650 milliGRAM(s) Oral every 6 hours PRN  senna 2 Tablet(s) Oral at bedtime PRN      Case discussed with resident    Care discussed with pt/family

## 2020-11-26 VITALS
DIASTOLIC BLOOD PRESSURE: 76 MMHG | RESPIRATION RATE: 18 BRPM | SYSTOLIC BLOOD PRESSURE: 114 MMHG | HEART RATE: 81 BPM | TEMPERATURE: 98 F

## 2020-11-26 LAB
ALBUMIN SERPL ELPH-MCNC: 3.4 G/DL — LOW (ref 3.5–5.2)
ALP SERPL-CCNC: 75 U/L — SIGNIFICANT CHANGE UP (ref 30–115)
ALT FLD-CCNC: 9 U/L — SIGNIFICANT CHANGE UP (ref 0–41)
ANION GAP SERPL CALC-SCNC: 12 MMOL/L — SIGNIFICANT CHANGE UP (ref 7–14)
AST SERPL-CCNC: 19 U/L — SIGNIFICANT CHANGE UP (ref 0–41)
BASOPHILS # BLD AUTO: 0.03 K/UL — SIGNIFICANT CHANGE UP (ref 0–0.2)
BASOPHILS NFR BLD AUTO: 0.6 % — SIGNIFICANT CHANGE UP (ref 0–1)
BILIRUB SERPL-MCNC: 0.3 MG/DL — SIGNIFICANT CHANGE UP (ref 0.2–1.2)
BUN SERPL-MCNC: 7 MG/DL — LOW (ref 10–20)
CALCIUM SERPL-MCNC: 8.9 MG/DL — SIGNIFICANT CHANGE UP (ref 8.5–10.1)
CHLORIDE SERPL-SCNC: 103 MMOL/L — SIGNIFICANT CHANGE UP (ref 98–110)
CO2 SERPL-SCNC: 21 MMOL/L — SIGNIFICANT CHANGE UP (ref 17–32)
CREAT SERPL-MCNC: 0.6 MG/DL — LOW (ref 0.7–1.5)
EOSINOPHIL # BLD AUTO: 0.25 K/UL — SIGNIFICANT CHANGE UP (ref 0–0.7)
EOSINOPHIL NFR BLD AUTO: 5.2 % — SIGNIFICANT CHANGE UP (ref 0–8)
GLUCOSE SERPL-MCNC: 86 MG/DL — SIGNIFICANT CHANGE UP (ref 70–99)
HCT VFR BLD CALC: 35.7 % — LOW (ref 42–52)
HGB BLD-MCNC: 11.9 G/DL — LOW (ref 14–18)
IMM GRANULOCYTES NFR BLD AUTO: 0.4 % — HIGH (ref 0.1–0.3)
LYMPHOCYTES # BLD AUTO: 1.63 K/UL — SIGNIFICANT CHANGE UP (ref 1.2–3.4)
LYMPHOCYTES # BLD AUTO: 34 % — SIGNIFICANT CHANGE UP (ref 20.5–51.1)
MCHC RBC-ENTMCNC: 32.9 PG — HIGH (ref 27–31)
MCHC RBC-ENTMCNC: 33.3 G/DL — SIGNIFICANT CHANGE UP (ref 32–37)
MCV RBC AUTO: 98.6 FL — HIGH (ref 80–94)
MONOCYTES # BLD AUTO: 0.45 K/UL — SIGNIFICANT CHANGE UP (ref 0.1–0.6)
MONOCYTES NFR BLD AUTO: 9.4 % — HIGH (ref 1.7–9.3)
NEUTROPHILS # BLD AUTO: 2.42 K/UL — SIGNIFICANT CHANGE UP (ref 1.4–6.5)
NEUTROPHILS NFR BLD AUTO: 50.4 % — SIGNIFICANT CHANGE UP (ref 42.2–75.2)
NRBC # BLD: 0 /100 WBCS — SIGNIFICANT CHANGE UP (ref 0–0)
PLATELET # BLD AUTO: 367 K/UL — SIGNIFICANT CHANGE UP (ref 130–400)
POTASSIUM SERPL-MCNC: 4.2 MMOL/L — SIGNIFICANT CHANGE UP (ref 3.5–5)
POTASSIUM SERPL-SCNC: 4.2 MMOL/L — SIGNIFICANT CHANGE UP (ref 3.5–5)
PROT SERPL-MCNC: 6.4 G/DL — SIGNIFICANT CHANGE UP (ref 6–8)
RBC # BLD: 3.62 M/UL — LOW (ref 4.7–6.1)
RBC # FLD: 14.4 % — SIGNIFICANT CHANGE UP (ref 11.5–14.5)
SODIUM SERPL-SCNC: 136 MMOL/L — SIGNIFICANT CHANGE UP (ref 135–146)
WBC # BLD: 4.8 K/UL — SIGNIFICANT CHANGE UP (ref 4.8–10.8)
WBC # FLD AUTO: 4.8 K/UL — SIGNIFICANT CHANGE UP (ref 4.8–10.8)

## 2020-11-26 RX ORDER — INFLUENZA VIRUS VACCINE 15; 15; 15; 15 UG/.5ML; UG/.5ML; UG/.5ML; UG/.5ML
0.5 SUSPENSION INTRAMUSCULAR ONCE
Refills: 0 | Status: DISCONTINUED | OUTPATIENT
Start: 2020-11-26 | End: 2020-11-26

## 2020-11-26 RX ADMIN — Medication 20 MILLIGRAM(S): at 01:47

## 2020-11-26 RX ADMIN — Medication 20 MILLIGRAM(S): at 05:26

## 2020-11-26 NOTE — DISCHARGE NOTE PROVIDER - HOSPITAL COURSE
Patient [facility]    55 years old homeless male with past medical history of left lung mass (diagnosed 2 years ago, but lost to follow up), former smoker (7 pack-years, quit 3 months ago), active alcohol use disorder, presented to ED with a complaint of chest pain x 1 day and mechanical fall with head trauma    #Left upper lobe lung mass with possible right adrenal mets  - Was diagnosed 2 years ago but was never followed up; former smoker  - Endorses weight loss of 20lbs; no B-symptoms, no hemoptysis  - f/u IR consult for biopsy and MRI w/co then consult heme/onc  - PET scan before IR biopsy     #Resorbed subdural hematoma s/p mechanical fall  - CTH 11/24 am showed trace subdural hemorrhage in the thickness of the posterior falx. Repeat in 4-5 hrs for stability  - CTH 11/24 pm showed no acute intracranial pathology  - Neurosx recs no acute neurosurgical intervention. Ok to start ppx DVT  - CT head STAT if there is a change of mental status  - Neuro check q4hrs. PT/OT  - TTE 11/25 showed EF 62%. No vegetations    #Hematochezia  - f/u stool studies    #Chest pain - musculoskeletal  - Reproducible with sternal palpation  - CTA chest - No PE on CT scan, D-dimer 1000  - EKG shows sinus rhythm, trop neg  - c/w ASA    #Macrocytic anemia  - Possibly secondary to alcohol use, thiamine/folic deficiency  - B12, folate wnl  - c/w thiamine, folic acid, multivitamin    #Active alcohol use disorder  - c/w CIWA protocol with Librium taper, can give extra dose of Ativan for breakthroughs  - CATCH team evaluation Patient [facility]    55 years old homeless male with past medical history of left lung mass (diagnosed 2 years ago, but lost to follow up), former smoker (7 pack-years, quit 3 months ago), active alcohol use disorder, presented to ED with a complaint of chest pain x 1 day and mechanical fall with head trauma    #Left upper lobe lung mass with possible right adrenal mets  - Was diagnosed 2 years ago but was never followed up; former smoker  - Endorses weight loss of 20lbs; no B-symptoms, no hemoptysis  - f/u IR consult for biopsy and MRI w/co then consult heme/onc  - PET scan before IR biopsy     #Resorbed subdural hematoma s/p mechanical fall  - CTH 11/24 am showed trace subdural hemorrhage in the thickness of the posterior falx. Repeat in 4-5 hrs for stability  - CTH 11/24 pm showed no acute intracranial pathology  - Neurosx recs no acute neurosurgical intervention. Ok to start ppx DVT  - CT head STAT if there is a change of mental status  - Neuro check q4hrs. PT/OT  - TTE 11/25 showed EF 62%. No vegetations    #Hematochezia  - f/u stool studies    #Chest pain - musculoskeletal  - Reproducible with sternal palpation  - CTA chest - No PE on CT scan, D-dimer 1000  - EKG shows sinus rhythm, trop neg  - c/w ASA    #Macrocytic anemia  - Possibly secondary to alcohol use, thiamine/folic deficiency  - B12, folate wnl  - c/w thiamine, folic acid, multivitamin    #Active alcohol use disorder  - c/w CIWA protocol with Librium taper, can give extra dose of Ativan for breakthroughs  - CATCH team evaluation    Attending attestation:   [facility]

## 2020-11-26 NOTE — PROGRESS NOTE ADULT - ASSESSMENT
55 years old homeless male with past medical history of left lung mass (diagnosed 2 years ago, but lost to follow up), former smoker (7 pack-years, quit 3 months ago), active alcohol use disorder, presented to ED with a complaint of chest pain x 1 day and mechanical fall with head trauma    #Left upper lobe lung mass with possible right adrenal mets  - Was diagnosed 2 years ago but was never followed up; former smoker  - Endorses weight loss of 20lbs; no B-symptoms, no hemoptysis  - f/u IR consult for biopsy and MRI w/co then consult heme/onc  - PET scan before IR biopsy     #Resorbed subdural hematoma s/p mechanical fall  - CTH 11/24 am showed trace subdural hemorrhage in the thickness of the posterior falx. Repeat in 4-5 hrs for stability  - CTH 11/24 pm showed no acute intracranial pathology  - Neurosx recs no acute neurosurgical intervention. Ok to start ppx DVT  - CT head STAT if there is a change of mental status  - Neuro check q4hrs. PT/OT  - TTE 11/25 showed EF 62%. No vegetations    #Hematochezia  - f/u stool studies    #Chest pain - musculoskeletal  - Reproducible with sternal palpation  - CTA chest - No PE on CT scan, D-dimer 1000  - EKG shows sinus rhythm, trop neg  - c/w ASA    #Macrocytic anemia  - Possibly secondary to alcohol use, thiamine/folic deficiency  - B12, folate wnl  - c/w thiamine, folic acid, multivitamin    #Active alcohol use disorder  - c/w CIWA protocol with Librium taper, can give extra dose of Ativan for breakthroughs  - CATCH team evaluation    DVT ppx: lovenox qd  GI ppx: protonix  Diet: Regular  Activity: Fall risk  Ambulatory status: Ambulate with cane  Lines: Peripheral IVs   Code status: FULL CODE

## 2020-11-26 NOTE — DISCHARGE NOTE PROVIDER - NSDCCPCAREPLAN_GEN_ALL_CORE_FT
PRINCIPAL DISCHARGE DIAGNOSIS  Diagnosis: Chest pain  Assessment and Plan of Treatment: atypical chest pain due to fall      SECONDARY DISCHARGE DIAGNOSES  Diagnosis: Pulmonary nodules  Assessment and Plan of Treatment: pending biopsy. patient eloped

## 2020-11-26 NOTE — PROGRESS NOTE ADULT - SUBJECTIVE AND OBJECTIVE BOX
SUBJECTIVE:  Patient is a 55y old Male who presents with a chief complaint of chest pain (25 Nov 2020 18:27)   Currently admitted to medicine with the primary diagnosis of Chest pain     Today is hospital day 2d. There are no new issues or overnight events.       HPI  HPI:  55 years old homeless male with past medical history of left lung mass (diagnosed 2 years ago, but lost to follow up), former smoker (7 pack-years, quit 3 months ago), active alcohol use disorder, presented to ED with a complaint of chest pain x 1 day. Patient woke up this morning with left sided, sharp chest pain. He has never had this pain prior. It is described as sharp, localized, non-exertional, intermittent, 8/10 pain. The pain went away in 1 hour but is present during the interview. He also sustained mechanical fall due to leg weakness today as he tried to get up from bed. He fell and sustained head trauma, no LOC, no dizziness, no seizure-liked activity. He got back up on his own and came to ED for evaluation. He is a poor historian and non-compliant to medical follow up. He endorses 20lb weight loss in 1 month, but no cough, no hemoptysis, no shortness of breath. He also denies abdominal pain. He had 2-3 episodes of vomiting this morning and 2 days of non-bloody loose stool.  (24 Nov 2020 12:10)      PAST MEDICAL & SURGICAL HISTORY  Severe alcohol use disorder    Mass of left lung    Lung cancer    Alcoholism /alcohol abuse    No significant past surgical history    No significant past surgical history      SOCIAL HISTORY:  Negative for smoking/alcohol/drug use.     ALLERGIES:  No Known Allergies    MEDICATIONS:  HOME MEDICATIONS    STANDING MEDICATIONS  aspirin  chewable 81 milliGRAM(s) Oral daily  chlordiazePOXIDE 20 milliGRAM(s) Oral every 4 hours  chlordiazePOXIDE 15 milliGRAM(s) Oral every 4 hours  chlordiazePOXIDE   Oral   chlorhexidine 4% Liquid 1 Application(s) Topical <User Schedule>  enoxaparin Injectable 40 milliGRAM(s) SubCutaneous daily  folic acid 1 milliGRAM(s) Oral daily  influenza   Vaccine 0.5 milliLiter(s) IntraMuscular once  multivitamin 1 Tablet(s) Oral daily  thiamine 100 milliGRAM(s) Oral daily    PRN MEDICATIONS  acetaminophen   Tablet .. 650 milliGRAM(s) Oral every 6 hours PRN  senna 2 Tablet(s) Oral at bedtime PRN    VITALS:   ICU Vital Signs Last 24 Hrs  T(C): 36.4 (26 Nov 2020 05:25), Max: 36.4 (25 Nov 2020 20:50)  T(F): 97.5 (26 Nov 2020 05:25), Max: 97.6 (25 Nov 2020 20:50)  HR: 81 (26 Nov 2020 05:25) (77 - 95)  BP: 114/76 (26 Nov 2020 05:25) (114/76 - 134/77)  BP(mean): --  ABP: --  ABP(mean): --  RR: 18 (26 Nov 2020 05:25) (18 - 19)  SpO2: 99% (25 Nov 2020 19:45) (99% - 99%)    LABS:                        11.8   5.62  )-----------( 384      ( 25 Nov 2020 06:56 )             36.1     11-25    135  |  101  |  10  ----------------------------<  80  4.3   |  23  |  0.7    Ca    8.9      25 Nov 2020 06:56  Mg     1.6     11-25    TPro  6.5  /  Alb  3.4<L>  /  TBili  0.4  /  DBili  x   /  AST  19  /  ALT  9   /  AlkPhos  83  11-25        I&O's Detail    25 Nov 2020 07:01  -  26 Nov 2020 07:00  --------------------------------------------------------  IN:    Oral Fluid: 800 mL  Total IN: 800 mL    OUT:  Total OUT: 0 mL    Total NET: 800 mL    CARDIAC MARKERS ( 25 Nov 2020 00:26 )  x     / <0.01 ng/mL / x     / x     / x      CARDIAC MARKERS ( 24 Nov 2020 21:33 )  x     / <0.01 ng/mL / x     / x     / x        PHYSICAL EXAM:  GEN: No acute distress  HEENT: Normocephalic  NECK: Supple  LUNGS: Decreased breathe sounds  HEART: Regular  ABD: Soft, non-tender, non-distended.  EXT: NE/2+PP  NEURO: AAOX3  PSYCH: Appropriate mood, responds appropriately

## 2020-12-01 ENCOUNTER — OUTPATIENT (OUTPATIENT)
Dept: OUTPATIENT SERVICES | Facility: HOSPITAL | Age: 55
LOS: 1 days | End: 2020-12-01
Payer: MEDICAID

## 2020-12-01 DIAGNOSIS — J43.9 EMPHYSEMA, UNSPECIFIED: ICD-10-CM

## 2020-12-01 DIAGNOSIS — F10.130 ALCOHOL ABUSE WITH WITHDRAWAL, UNCOMPLICATED: ICD-10-CM

## 2020-12-01 DIAGNOSIS — E83.42 HYPOMAGNESEMIA: ICD-10-CM

## 2020-12-01 DIAGNOSIS — E51.9 THIAMINE DEFICIENCY, UNSPECIFIED: ICD-10-CM

## 2020-12-01 DIAGNOSIS — F10.120 ALCOHOL ABUSE WITH INTOXICATION, UNCOMPLICATED: ICD-10-CM
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
## 2020-12-01 DIAGNOSIS — S06.5X9A TRAUMATIC SUBDURAL HEMORRHAGE WITH LOSS OF CONSCIOUSNESS OF UNSPECIFIED DURATION, INITIAL ENCOUNTER: ICD-10-CM

## 2020-12-01 DIAGNOSIS — Z59.0 HOMELESSNESS: ICD-10-CM

## 2020-12-01 DIAGNOSIS — C79.71 SECONDARY MALIGNANT NEOPLASM OF RIGHT ADRENAL GLAND: ICD-10-CM

## 2020-12-01 DIAGNOSIS — D52.8 OTHER FOLATE DEFICIENCY ANEMIAS: ICD-10-CM

## 2020-12-01 DIAGNOSIS — Z87.891 PERSONAL HISTORY OF NICOTINE DEPENDENCE: ICD-10-CM
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
## 2020-12-01 DIAGNOSIS — Z91.19 PATIENT'S NONCOMPLIANCE WITH OTHER MEDICAL TREATMENT AND REGIMEN: ICD-10-CM

## 2020-12-01 DIAGNOSIS — C34.12 MALIGNANT NEOPLASM OF UPPER LOBE, LEFT BRONCHUS OR LUNG: ICD-10-CM

## 2020-12-01 DIAGNOSIS — K92.1 MELENA: ICD-10-CM
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
## 2020-12-01 DIAGNOSIS — R07.89 OTHER CHEST PAIN: ICD-10-CM

## 2020-12-01 DIAGNOSIS — Z56.0 UNEMPLOYMENT, UNSPECIFIED: ICD-10-CM

## 2020-12-01 SDOH — ECONOMIC STABILITY - HOUSING INSECURITY: HOMELESSNESS: Z59.0

## 2020-12-01 SDOH — ECONOMIC STABILITY - INCOME SECURITY: UNEMPLOYMENT, UNSPECIFIED: Z56.0

## 2020-12-11 DIAGNOSIS — Z71.89 OTHER SPECIFIED COUNSELING: ICD-10-CM

## 2020-12-11 PROBLEM — F10.20 ALCOHOL DEPENDENCE, UNCOMPLICATED: Chronic | Status: ACTIVE | Noted: 2020-11-24

## 2020-12-11 PROBLEM — R91.8 OTHER NONSPECIFIC ABNORMAL FINDING OF LUNG FIELD: Chronic | Status: ACTIVE | Noted: 2020-11-24

## 2021-01-06 NOTE — PATIENT PROFILE ADULT - SBIRT REFERRAL
Labs pending for review    Future Appointments   Date Time Provider Lluvia Irene   2/1/2021  7:45 AM Shari Schaeffer MD [facility] [address]   2/1/2021 10:45 AM Stacie Armijo DO [facility] [address] [facility] SBIRT referral

## 2021-03-26 ENCOUNTER — EMERGENCY (EMERGENCY)
Facility: HOSPITAL | Age: 56
LOS: 1 days | Discharge: HOME | End: 2021-03-26
Attending: EMERGENCY MEDICINE | Admitting: EMERGENCY MEDICINE
Payer: MEDICAID

## 2021-03-26 VITALS
SYSTOLIC BLOOD PRESSURE: 132 MMHG | DIASTOLIC BLOOD PRESSURE: 78 MMHG | HEART RATE: 97 BPM | WEIGHT: 141.1 LBS | TEMPERATURE: 98 F | OXYGEN SATURATION: 98 % | HEIGHT: 72 IN | RESPIRATION RATE: 16 BRPM

## 2021-03-26 PROCEDURE — 93010 ELECTROCARDIOGRAM REPORT: CPT

## 2021-03-26 PROCEDURE — 99285 EMERGENCY DEPT VISIT HI MDM: CPT

## 2021-03-26 NOTE — ED ADULT NURSE NOTE - NSIMPLEMENTINTERV_GEN_ALL_ED
Implemented All Universal Safety Interventions:  [place] to call system. Call bell, personal items and telephone within reach. Instruct patient to call for assistance. Room bathroom lighting operational. Non-slip footwear when patient is off stretcher. Physically safe environment: no spills, clutter or unnecessary equipment. Stretcher in lowest position, wheels locked, appropriate side rails in place.

## 2021-03-26 NOTE — ED ADULT TRIAGE NOTE - CHIEF COMPLAINT QUOTE
Pt presents to ER c/o N/V since this morning. Pt denies any associated symptoms at this time. Pt reports being seen at [facility] for same yesterday. Pt falling asleep in during triage

## 2021-03-26 NOTE — ED ADULT NURSE NOTE - CHIEF COMPLAINT QUOTE
Pt presents to ER c/o N/V since this morning. Pt denies any associated symptoms at this time. Pt reports being seen at [facility] for same yesterday. Pt falling asleep in during triage

## 2021-03-26 NOTE — ED ADULT NURSE NOTE - OBJECTIVE STATEMENT
Patient to the ED with complaint of worsening upper epigastric pain nausea and vomiting beginning yesterday, endorsed that he was seen at another facility yesterday for the same complaint, given motrin with no relief, hx of lung CA diag in 2019, no treatment received stage 4 at moment.

## 2021-03-27 LAB
ALBUMIN SERPL ELPH-MCNC: 4.1 G/DL — SIGNIFICANT CHANGE UP (ref 3.3–5)
ALP SERPL-CCNC: 108 U/L — SIGNIFICANT CHANGE UP (ref 40–120)
ALT FLD-CCNC: 51 U/L — HIGH (ref 10–45)
ANION GAP SERPL CALC-SCNC: 11 MMOL/L — SIGNIFICANT CHANGE UP (ref 5–17)
AST SERPL-CCNC: 92 U/L — HIGH (ref 10–40)
BASOPHILS # BLD AUTO: 0.04 K/UL — SIGNIFICANT CHANGE UP (ref 0–0.2)
BASOPHILS NFR BLD AUTO: 0.6 % — SIGNIFICANT CHANGE UP (ref 0–2)
BILIRUB SERPL-MCNC: 0.4 MG/DL — SIGNIFICANT CHANGE UP (ref 0.2–1.2)
BUN SERPL-MCNC: 14 MG/DL — SIGNIFICANT CHANGE UP (ref 7–23)
CALCIUM SERPL-MCNC: 9.4 MG/DL — SIGNIFICANT CHANGE UP (ref 8.4–10.5)
CHLORIDE SERPL-SCNC: 97 MMOL/L — SIGNIFICANT CHANGE UP (ref 96–108)
CO2 SERPL-SCNC: 29 MMOL/L — SIGNIFICANT CHANGE UP (ref 22–31)
CREAT SERPL-MCNC: 0.74 MG/DL — SIGNIFICANT CHANGE UP (ref 0.5–1.3)
EOSINOPHIL # BLD AUTO: 0.1 K/UL — SIGNIFICANT CHANGE UP (ref 0–0.5)
EOSINOPHIL NFR BLD AUTO: 1.6 % — SIGNIFICANT CHANGE UP (ref 0–6)
ETHANOL SERPL-MCNC: 158 MG/DL — HIGH (ref 0–10)
GLUCOSE SERPL-MCNC: 90 MG/DL — SIGNIFICANT CHANGE UP (ref 70–99)
HCT VFR BLD CALC: 35.8 % — LOW (ref 39–50)
HGB BLD-MCNC: 11.6 G/DL — LOW (ref 13–17)
IMM GRANULOCYTES NFR BLD AUTO: 0.3 % — SIGNIFICANT CHANGE UP (ref 0–1.5)
LYMPHOCYTES # BLD AUTO: 2.03 K/UL — SIGNIFICANT CHANGE UP (ref 1–3.3)
LYMPHOCYTES # BLD AUTO: 32.1 % — SIGNIFICANT CHANGE UP (ref 13–44)
MCHC RBC-ENTMCNC: 32 PG — SIGNIFICANT CHANGE UP (ref 27–34)
MCHC RBC-ENTMCNC: 32.4 GM/DL — SIGNIFICANT CHANGE UP (ref 32–36)
MCV RBC AUTO: 98.6 FL — SIGNIFICANT CHANGE UP (ref 80–100)
MONOCYTES # BLD AUTO: 0.62 K/UL — SIGNIFICANT CHANGE UP (ref 0–0.9)
MONOCYTES NFR BLD AUTO: 9.8 % — SIGNIFICANT CHANGE UP (ref 2–14)
NEUTROPHILS # BLD AUTO: 3.51 K/UL — SIGNIFICANT CHANGE UP (ref 1.8–7.4)
NEUTROPHILS NFR BLD AUTO: 55.6 % — SIGNIFICANT CHANGE UP (ref 43–77)
NRBC # BLD: 0 /100 WBCS — SIGNIFICANT CHANGE UP (ref 0–0)
PLATELET # BLD AUTO: 313 K/UL — SIGNIFICANT CHANGE UP (ref 150–400)
POTASSIUM SERPL-MCNC: 4.7 MMOL/L — SIGNIFICANT CHANGE UP (ref 3.5–5.3)
POTASSIUM SERPL-SCNC: 4.7 MMOL/L — SIGNIFICANT CHANGE UP (ref 3.5–5.3)
PROT SERPL-MCNC: 8.2 G/DL — SIGNIFICANT CHANGE UP (ref 6–8.3)
RBC # BLD: 3.63 M/UL — LOW (ref 4.2–5.8)
RBC # FLD: 15.2 % — HIGH (ref 10.3–14.5)
SODIUM SERPL-SCNC: 137 MMOL/L — SIGNIFICANT CHANGE UP (ref 135–145)
TROPONIN T SERPL-MCNC: <0.01 NG/ML — SIGNIFICANT CHANGE UP (ref 0–0.01)
WBC # BLD: 6.32 K/UL — SIGNIFICANT CHANGE UP (ref 3.8–10.5)
WBC # FLD AUTO: 6.32 K/UL — SIGNIFICANT CHANGE UP (ref 3.8–10.5)

## 2021-03-27 PROCEDURE — 80307 DRUG TEST PRSMV CHEM ANLYZR: CPT

## 2021-03-27 PROCEDURE — 96375 TX/PRO/DX INJ NEW DRUG ADDON: CPT

## 2021-03-27 PROCEDURE — 99284 EMERGENCY DEPT VISIT MOD MDM: CPT | Mod: 25

## 2021-03-27 PROCEDURE — 80053 COMPREHEN METABOLIC PANEL: CPT

## 2021-03-27 PROCEDURE — 84484 ASSAY OF TROPONIN QUANT: CPT

## 2021-03-27 PROCEDURE — 93005 ELECTROCARDIOGRAM TRACING: CPT

## 2021-03-27 PROCEDURE — 96374 THER/PROPH/DIAG INJ IV PUSH: CPT

## 2021-03-27 PROCEDURE — 85025 COMPLETE CBC W/AUTO DIFF WBC: CPT

## 2021-03-27 PROCEDURE — 36415 COLL VENOUS BLD VENIPUNCTURE: CPT

## 2021-03-27 RX ORDER — FAMOTIDINE 10 MG/ML
20 INJECTION INTRAVENOUS ONCE
Refills: 0 | Status: COMPLETED | OUTPATIENT
Start: 2021-03-27 | End: 2021-03-27

## 2021-03-27 RX ORDER — ONDANSETRON 8 MG/1
4 TABLET, FILM COATED ORAL ONCE
Refills: 0 | Status: COMPLETED | OUTPATIENT
Start: 2021-03-27 | End: 2021-03-27

## 2021-03-27 RX ADMIN — Medication 50 MILLIGRAM(S): at 02:41

## 2021-03-27 RX ADMIN — ONDANSETRON 4 MILLIGRAM(S): 8 TABLET, FILM COATED ORAL at 00:57

## 2021-03-27 RX ADMIN — FAMOTIDINE 20 MILLIGRAM(S): 10 INJECTION INTRAVENOUS at 00:57

## 2021-03-27 NOTE — ED PROVIDER NOTE - PATIENT PORTAL LINK FT
You can access the FollowMyHealth Patient Portal offered by [facility] by registering at the following website: [email or web address]. By joining [employer]’s FollowMyHealth portal, you will also be able to view your health information using other applications (apps) compatible with our system.

## 2021-03-27 NOTE — ED PROVIDER NOTE - CLINICAL SUMMARY MEDICAL DECISION MAKING FREE TEXT BOX
55 y/o M with chest pain since this morning, will check labs, EKG. Patient given Zofran, pepcid, reassess. 57 y/o M with chest pain since this morning, will check labs, EKG. Patient given Zofran, pepcid, reassess.  Pt given Librium PO in ED. Well appearing in ED, in NAD. vitals stable.

## 2021-03-27 NOTE — ED PROVIDER NOTE - CARE PLAN
Principal Discharge DX:	Chest pain   Principal Discharge DX:	Chest pain  Secondary Diagnosis:	Alcohol abuse

## 2021-03-27 NOTE — ED PROVIDER NOTE - OBJECTIVE STATEMENT
55 y/o M coming in c/o chest pain since this morning with vomiting. Patient is sleeping in ED, comfortable, resting, needed to be awoken to interview. NAD, MARINO.

## 2021-03-30 DIAGNOSIS — R07.9 CHEST PAIN, UNSPECIFIED: ICD-10-CM
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
## 2021-03-30 DIAGNOSIS — R11.2 NAUSEA WITH VOMITING, UNSPECIFIED: ICD-10-CM

## 2021-03-30 DIAGNOSIS — Z85.118 PERSONAL HISTORY OF OTHER MALIGNANT NEOPLASM OF BRONCHUS AND LUNG: ICD-10-CM

## 2021-03-30 DIAGNOSIS — F10.10 ALCOHOL ABUSE, UNCOMPLICATED: ICD-10-CM

## 2021-04-17 ENCOUNTER — EMERGENCY (EMERGENCY)
Facility: HOSPITAL | Age: 56
LOS: 1 days | Discharge: HOME | End: 2021-04-17
Admitting: EMERGENCY MEDICINE
Payer: MEDICAID

## 2021-04-17 VITALS
SYSTOLIC BLOOD PRESSURE: 147 MMHG | HEIGHT: 72 IN | HEART RATE: 80 BPM | OXYGEN SATURATION: 94 % | DIASTOLIC BLOOD PRESSURE: 78 MMHG | RESPIRATION RATE: 16 BRPM | TEMPERATURE: 98 F | WEIGHT: 199.96 LBS
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
## 2021-04-17 DIAGNOSIS — T14.8XXA OTHER INJURY OF UNSPECIFIED BODY REGION, INITIAL ENCOUNTER: ICD-10-CM

## 2021-04-17 DIAGNOSIS — R07.81 PLEURODYNIA: ICD-10-CM
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
## 2021-04-17 DIAGNOSIS — Z85.118 PERSONAL HISTORY OF OTHER MALIGNANT NEOPLASM OF BRONCHUS AND LUNG: ICD-10-CM

## 2021-04-17 DIAGNOSIS — S62.521A DISPLACED FRACTURE OF DISTAL PHALANX OF RIGHT THUMB, INITIAL ENCOUNTER FOR CLOSED FRACTURE: ICD-10-CM

## 2021-04-17 PROCEDURE — 99284 EMERGENCY DEPT VISIT MOD MDM: CPT | Mod: 25

## 2021-04-17 PROCEDURE — 71101 X-RAY EXAM UNILAT RIBS/CHEST: CPT | Mod: 26,RT

## 2021-04-17 PROCEDURE — 73140 X-RAY EXAM OF FINGER(S): CPT | Mod: 26,RT

## 2021-04-17 RX ORDER — IBUPROFEN 200 MG
600 TABLET ORAL ONCE
Refills: 0 | Status: COMPLETED | OUTPATIENT
Start: 2021-04-17 | End: 2021-04-17

## 2021-04-17 RX ADMIN — Medication 600 MILLIGRAM(S): at 02:35

## 2021-04-17 NOTE — ED PROVIDER NOTE - NSFOLLOWUPINSTRUCTIONS_ED_ALL_ED_FT
Fracture    A fracture is a break in one of your bones. This can occur from a variety of injuries, especially traumatic ones. Symptoms include pain, bruising, or swelling. Do not use the injured limb. If a fracture is in one of the bones below your waist, do not put weight on that limb unless instructed to do so by your healthcare provider. Crutches or a cane may have been provided. A splint or cast may have been applied by your health care provider. Make sure to keep it dry and follow up with an orthopedist as instructed.    SEEK IMMEDIATE MEDICAL CARE IF YOU HAVE ANY OF THE FOLLOWING SYMPTOMS: numbness, tingling, increasing pain, or weakness in any part of the injured limb.      Contusion    A contusion is a deep bruise. Contusions are the result of a blunt injury to tissues and muscle fibers under the skin. The skin overlying the contusion may turn blue, purple, or yellow. Symptoms also include pain and swelling in the injured area.    SEEK IMMEDIATE MEDICAL CARE IF YOU HAVE ANY OF THE FOLLOWING SYMPTOMS: severe pain, numbness, tingling, pain, weakness, or skin color/temperature change in any part of your body distal to the injury. Fracture  A fracture is a break in one of your bones. This can occur from a variety of injuries, especially traumatic ones. Symptoms include pain, bruising, or swelling. Do not use the injured limb. If a fracture is in one of the bones below your waist, do not put weight on that limb unless instructed to do so by your healthcare provider. Crutches or a cane may have been provided. A splint or cast may have been applied by your health care provider. Make sure to keep it dry and follow up with an orthopedist as instructed.    SEEK IMMEDIATE MEDICAL CARE IF YOU HAVE ANY OF THE FOLLOWING SYMPTOMS: numbness, tingling, increasing pain, or weakness in any part of the injured limb.      Contusion  A contusion is a deep bruise. Contusions are the result of a blunt injury to tissues and muscle fibers under the skin. The skin overlying the contusion may turn blue, purple, or yellow. Symptoms also include pain and swelling in the injured area.    SEEK IMMEDIATE MEDICAL CARE IF YOU HAVE ANY OF THE FOLLOWING SYMPTOMS: severe pain, numbness, tingling, pain, weakness, or skin color/temperature change in any part of your body distal to the injury.      FOLLOW UP WITH HAND SURGEON AT [facility].

## 2021-04-17 NOTE — ED PROVIDER NOTE - OBJECTIVE STATEMENT
57 y/o M presents c/o R rib and R thumb pain s/p fall this morning.  Pt states he usually walks with a cane and lost his cane recently.  He cannot what he fall on or why he fell.  He denies headache, neck or back pain, sob, cough, abd pain, n/v/d, weakness, numbness (despite triage note).

## 2021-04-17 NOTE — ED ADULT NURSE NOTE - OBJECTIVE STATEMENT
56y male presents to ED. During nursing assessment, pt not providing information. States, "A lot is going on. I'm in pain." Refuses to give further information. LUNSFORD. Breathing spontaneous and unlabored. No visible signs of trauma. Speaking full and complete sentences. Able to state name and date of birth for medication administration.

## 2021-04-17 NOTE — ED PROVIDER NOTE - WR INTERPRETATION 2
CXR negative - No infiltrates, No consolidation, No atelectasis seenCXR negative - No pneumothorax, No opacities, No free air, no displaced rib fracture

## 2021-04-17 NOTE — ED ADULT TRIAGE NOTE - BP NONINVASIVE SYSTOLIC (MM HG)
Not due until 9-28-18      Last office visit 6/28/2018     Last written 4-25-18 #30 with 2 refills      Next office visit scheduled Visit date not scheduled     Requested Prescriptions     Pending Prescriptions Disp Refills    valACYclovir (VALTREX) 500 MG tablet [Pharmacy Med Name: VALACYCLOVIR  MG TABLET] 30 tablet 2     Sig: TAKE 1 TABLET BY MOUTH DAILY
147

## 2021-04-17 NOTE — ED ADULT TRIAGE NOTE - CHIEF COMPLAINT QUOTE
Walk in with multiple medical complaints including right thumb pain, back pain, b/l leg pain and requesting to " rest my feet". Pt unkempt, non-compliant with triage assessment.

## 2021-04-17 NOTE — ED PROVIDER NOTE - CARE PLAN
Principal Discharge DX:	Contusion  Secondary Diagnosis:	Closed nondisplaced fracture of phalanx of right thumb, unspecified phalanx, initial encounter

## 2021-04-17 NOTE — ED PROVIDER NOTE - PATIENT PORTAL LINK FT
You can access the FollowMyHealth Patient Portal offered by [facility] by registering at the following website: [email or web address]. By joining [employer]’s FollowMyHealth portal, you will also be able to view your health information using other applications (apps) compatible with our system.

## 2021-04-17 NOTE — ED PROVIDER NOTE - CLINICAL SUMMARY MEDICAL DECISION MAKING FREE TEXT BOX
57 y/o M presents to ED c/o R thumb pain and R rib pain.  Pt well appearing, VSS, NAD.  Xray rib wet read shows no displaced rib fractures.  R thumb Xray show corticated, comminuted fractures of distal and mid phalanx that appear chronic, not acute.   Results and diagnosis discussed with patient.  Treatment plan discussed.  Return precautions discussed.  Pt verbalized understanding and given the opportunity to ask questions.  Patient advised to follow up with primary care provider.

## 2021-04-17 NOTE — ED POST DISCHARGE NOTE - DETAILS
attempted to contact pt but pt has no phone number or address in the chart. called pt's spouse, but it is the incorrect number. called pt's daughter and it is disconnected.

## 2021-04-17 NOTE — ED PROVIDER NOTE - WR INTERPRETATION 1
MSK XR negative - corticated fractures of proximal and distal phalanx, No dislocation, No foreign body

## 2021-04-17 NOTE — ED POST DISCHARGE NOTE - RESULT SUMMARY
radiologist called and noted mass on CXR which was also noted in November CT but might appear larger.

## 2021-07-23 ENCOUNTER — HOSPITAL ENCOUNTER (INPATIENT)
Dept: HOSPITAL 74 - YASAS | Age: 56
LOS: 3 days | Discharge: LEFT BEFORE DISCHARGE | DRG: 770 | End: 2021-07-26
Attending: ALLERGY & IMMUNOLOGY | Admitting: ALLERGY & IMMUNOLOGY
Payer: COMMERCIAL

## 2021-07-23 VITALS — BODY MASS INDEX: 19 KG/M2

## 2021-07-23 DIAGNOSIS — F17.210: ICD-10-CM

## 2021-07-23 DIAGNOSIS — Z85.118: ICD-10-CM

## 2021-07-23 DIAGNOSIS — F10.230: Primary | ICD-10-CM

## 2021-07-23 PROCEDURE — U0003 INFECTIOUS AGENT DETECTION BY NUCLEIC ACID (DNA OR RNA); SEVERE ACUTE RESPIRATORY SYNDROME CORONAVIRUS 2 (SARS-COV-2) (CORONAVIRUS DISEASE [COVID-19]), AMPLIFIED PROBE TECHNIQUE, MAKING USE OF HIGH THROUGHPUT TECHNOLOGIES AS DESCRIBED BY CMS-2020-01-R: HCPCS

## 2021-07-23 PROCEDURE — U0005 INFEC AGEN DETEC AMPLI PROBE: HCPCS

## 2021-07-23 PROCEDURE — C9803 HOPD COVID-19 SPEC COLLECT: HCPCS

## 2021-07-24 PROCEDURE — HZ2ZZZZ DETOXIFICATION SERVICES FOR SUBSTANCE ABUSE TREATMENT: ICD-10-PCS | Performed by: ALLERGY & IMMUNOLOGY

## 2021-07-24 RX ADMIN — Medication SCH TAB: at 10:26

## 2021-07-24 RX ADMIN — NICOTINE SCH MG: 14 PATCH, EXTENDED RELEASE TRANSDERMAL at 10:27

## 2021-07-24 RX ADMIN — Medication SCH MG: at 22:14

## 2021-07-24 RX ADMIN — METHOCARBAMOL PRN MG: 500 TABLET ORAL at 05:48

## 2021-07-24 RX ADMIN — METHOCARBAMOL PRN MG: 500 TABLET ORAL at 22:17

## 2021-07-24 RX ADMIN — IBUPROFEN PRN MG: 400 TABLET, FILM COATED ORAL at 10:29

## 2021-07-25 LAB
ALBUMIN SERPL-MCNC: 3.2 G/DL (ref 3.4–5)
ALP SERPL-CCNC: 86 U/L (ref 45–117)
ALT SERPL-CCNC: 42 U/L (ref 13–61)
ANION GAP SERPL CALC-SCNC: 8 MMOL/L (ref 8–16)
AST SERPL-CCNC: 52 U/L (ref 15–37)
BILIRUB SERPL-MCNC: 0.4 MG/DL (ref 0.2–1)
BUN SERPL-MCNC: 11.4 MG/DL (ref 7–18)
CALCIUM SERPL-MCNC: 8.7 MG/DL (ref 8.5–10.1)
CHLORIDE SERPL-SCNC: 105 MMOL/L (ref 98–107)
CO2 SERPL-SCNC: 25 MMOL/L (ref 21–32)
CREAT SERPL-MCNC: 0.7 MG/DL (ref 0.55–1.3)
GLUCOSE SERPL-MCNC: 68 MG/DL (ref 74–106)
PROT SERPL-MCNC: 7.1 G/DL (ref 6.4–8.2)
SODIUM SERPL-SCNC: 137 MMOL/L (ref 136–145)

## 2021-07-25 RX ADMIN — ACETAMINOPHEN PRN MG: 325 TABLET ORAL at 02:21

## 2021-07-25 RX ADMIN — ACETAMINOPHEN PRN MG: 325 TABLET ORAL at 10:39

## 2021-07-25 RX ADMIN — Medication SCH MG: at 22:57

## 2021-07-25 RX ADMIN — NICOTINE SCH MG: 14 PATCH, EXTENDED RELEASE TRANSDERMAL at 10:38

## 2021-07-25 RX ADMIN — IBUPROFEN PRN MG: 400 TABLET, FILM COATED ORAL at 18:36

## 2021-07-25 RX ADMIN — Medication SCH TAB: at 10:38

## 2021-07-25 RX ADMIN — METHOCARBAMOL PRN MG: 500 TABLET ORAL at 05:29

## 2021-07-25 RX ADMIN — METHOCARBAMOL PRN MG: 500 TABLET ORAL at 21:30

## 2021-07-26 VITALS — SYSTOLIC BLOOD PRESSURE: 132 MMHG | HEART RATE: 108 BPM | DIASTOLIC BLOOD PRESSURE: 84 MMHG

## 2021-07-26 VITALS — TEMPERATURE: 96.9 F

## 2021-07-26 RX ADMIN — METHOCARBAMOL PRN MG: 500 TABLET ORAL at 05:28

## 2021-07-27 ENCOUNTER — EMERGENCY (EMERGENCY)
Facility: HOSPITAL | Age: 56
LOS: 1 days | Discharge: LEFT BEFORE DISCHARGE | End: 2021-07-27
Attending: EMERGENCY MEDICINE | Admitting: EMERGENCY MEDICINE
Payer: MEDICAID

## 2021-07-27 VITALS
DIASTOLIC BLOOD PRESSURE: 76 MMHG | HEIGHT: 72 IN | OXYGEN SATURATION: 92 % | SYSTOLIC BLOOD PRESSURE: 116 MMHG | HEART RATE: 118 BPM | TEMPERATURE: 99 F | WEIGHT: 160.06 LBS | RESPIRATION RATE: 20 BRPM

## 2021-07-27 VITALS — OXYGEN SATURATION: 96 % | RESPIRATION RATE: 18 BRPM

## 2021-07-27 DIAGNOSIS — R10.13 EPIGASTRIC PAIN: ICD-10-CM

## 2021-07-27 DIAGNOSIS — K92.0 HEMATEMESIS: ICD-10-CM

## 2021-07-27 DIAGNOSIS — R00.0 TACHYCARDIA, UNSPECIFIED: ICD-10-CM

## 2021-07-27 DIAGNOSIS — Z20.822 CONTACT WITH AND (SUSPECTED) EXPOSURE TO COVID-19: ICD-10-CM

## 2021-07-27 DIAGNOSIS — C34.90 MALIGNANT NEOPLASM OF UNSPECIFIED PART OF UNSPECIFIED BRONCHUS OR LUNG: ICD-10-CM

## 2021-07-27 LAB
ALBUMIN SERPL ELPH-MCNC: 3.5 G/DL — SIGNIFICANT CHANGE UP (ref 3.4–5)
ALP SERPL-CCNC: 74 U/L — SIGNIFICANT CHANGE UP (ref 40–120)
ALT FLD-CCNC: 36 U/L — SIGNIFICANT CHANGE UP (ref 12–42)
AMPHET UR-MCNC: NEGATIVE — SIGNIFICANT CHANGE UP
ANION GAP SERPL CALC-SCNC: 11 MMOL/L — SIGNIFICANT CHANGE UP (ref 9–16)
APPEARANCE UR: CLEAR — SIGNIFICANT CHANGE UP
APTT BLD: 32 SEC — SIGNIFICANT CHANGE UP (ref 27.5–35.5)
AST SERPL-CCNC: 38 U/L — HIGH (ref 15–37)
BARBITURATES UR SCN-MCNC: NEGATIVE — SIGNIFICANT CHANGE UP
BASOPHILS # BLD AUTO: 0.03 K/UL — SIGNIFICANT CHANGE UP (ref 0–0.2)
BASOPHILS NFR BLD AUTO: 0.5 % — SIGNIFICANT CHANGE UP (ref 0–2)
BENZODIAZ UR-MCNC: POSITIVE
BILIRUB SERPL-MCNC: 0.2 MG/DL — SIGNIFICANT CHANGE UP (ref 0.2–1.2)
BILIRUB UR-MCNC: NEGATIVE — SIGNIFICANT CHANGE UP
BUN SERPL-MCNC: 5 MG/DL — LOW (ref 7–23)
CALCIUM SERPL-MCNC: 8.5 MG/DL — SIGNIFICANT CHANGE UP (ref 8.5–10.5)
CHLORIDE SERPL-SCNC: 100 MMOL/L — SIGNIFICANT CHANGE UP (ref 96–108)
CO2 SERPL-SCNC: 24 MMOL/L — SIGNIFICANT CHANGE UP (ref 22–31)
COCAINE METAB.OTHER UR-MCNC: NEGATIVE — SIGNIFICANT CHANGE UP
COLOR SPEC: YELLOW — SIGNIFICANT CHANGE UP
CREAT SERPL-MCNC: 0.75 MG/DL — SIGNIFICANT CHANGE UP (ref 0.5–1.3)
DIFF PNL FLD: NEGATIVE — SIGNIFICANT CHANGE UP
EOSINOPHIL # BLD AUTO: 0.4 K/UL — SIGNIFICANT CHANGE UP (ref 0–0.5)
EOSINOPHIL NFR BLD AUTO: 6.5 % — HIGH (ref 0–6)
ETHANOL SERPL-MCNC: 296 MG/DL — HIGH
GLUCOSE SERPL-MCNC: 100 MG/DL — HIGH (ref 70–99)
GLUCOSE UR QL: NEGATIVE — SIGNIFICANT CHANGE UP
HCT VFR BLD CALC: 32.1 % — LOW (ref 39–50)
HGB BLD-MCNC: 10.9 G/DL — LOW (ref 13–17)
IMM GRANULOCYTES NFR BLD AUTO: 0.3 % — SIGNIFICANT CHANGE UP (ref 0–1.5)
INR BLD: 0.93 — SIGNIFICANT CHANGE UP (ref 0.88–1.16)
KETONES UR-MCNC: NEGATIVE — SIGNIFICANT CHANGE UP
LACTATE SERPL-SCNC: 1.3 MMOL/L — SIGNIFICANT CHANGE UP (ref 0.4–2)
LEUKOCYTE ESTERASE UR-ACNC: NEGATIVE — SIGNIFICANT CHANGE UP
LIDOCAIN IGE QN: 93 U/L — SIGNIFICANT CHANGE UP (ref 73–393)
LYMPHOCYTES # BLD AUTO: 2.19 K/UL — SIGNIFICANT CHANGE UP (ref 1–3.3)
LYMPHOCYTES # BLD AUTO: 35.6 % — SIGNIFICANT CHANGE UP (ref 13–44)
MAGNESIUM SERPL-MCNC: 1.7 MG/DL — SIGNIFICANT CHANGE UP (ref 1.6–2.6)
MCHC RBC-ENTMCNC: 31.3 PG — SIGNIFICANT CHANGE UP (ref 27–34)
MCHC RBC-ENTMCNC: 34 GM/DL — SIGNIFICANT CHANGE UP (ref 32–36)
MCV RBC AUTO: 92.2 FL — SIGNIFICANT CHANGE UP (ref 80–100)
METHADONE UR-MCNC: NEGATIVE — SIGNIFICANT CHANGE UP
MONOCYTES # BLD AUTO: 0.5 K/UL — SIGNIFICANT CHANGE UP (ref 0–0.9)
MONOCYTES NFR BLD AUTO: 8.1 % — SIGNIFICANT CHANGE UP (ref 2–14)
NEUTROPHILS # BLD AUTO: 3.01 K/UL — SIGNIFICANT CHANGE UP (ref 1.8–7.4)
NEUTROPHILS NFR BLD AUTO: 49 % — SIGNIFICANT CHANGE UP (ref 43–77)
NITRITE UR-MCNC: NEGATIVE — SIGNIFICANT CHANGE UP
NRBC # BLD: 0 /100 WBCS — SIGNIFICANT CHANGE UP (ref 0–0)
OB PNL STL: POSITIVE
OPIATES UR-MCNC: NEGATIVE — SIGNIFICANT CHANGE UP
PCP SPEC-MCNC: SIGNIFICANT CHANGE UP
PCP UR-MCNC: NEGATIVE — SIGNIFICANT CHANGE UP
PH UR: 5.5 — SIGNIFICANT CHANGE UP (ref 5–8)
PLATELET # BLD AUTO: 242 K/UL — SIGNIFICANT CHANGE UP (ref 150–400)
POTASSIUM SERPL-MCNC: 4.1 MMOL/L — SIGNIFICANT CHANGE UP (ref 3.5–5.3)
POTASSIUM SERPL-SCNC: 4.1 MMOL/L — SIGNIFICANT CHANGE UP (ref 3.5–5.3)
PROT SERPL-MCNC: 8.2 G/DL — SIGNIFICANT CHANGE UP (ref 6.4–8.2)
PROT UR-MCNC: NEGATIVE MG/DL — SIGNIFICANT CHANGE UP
PROTHROM AB SERPL-ACNC: 11 SEC — SIGNIFICANT CHANGE UP (ref 10.6–13.6)
RBC # BLD: 3.48 M/UL — LOW (ref 4.2–5.8)
RBC # FLD: 15 % — HIGH (ref 10.3–14.5)
SARS-COV-2 RNA SPEC QL NAA+PROBE: SIGNIFICANT CHANGE UP
SODIUM SERPL-SCNC: 135 MMOL/L — SIGNIFICANT CHANGE UP (ref 132–145)
SP GR SPEC: 1.01 — SIGNIFICANT CHANGE UP (ref 1–1.03)
THC UR QL: NEGATIVE — SIGNIFICANT CHANGE UP
UROBILINOGEN FLD QL: 0.2 E.U./DL — SIGNIFICANT CHANGE UP
WBC # BLD: 6.15 K/UL — SIGNIFICANT CHANGE UP (ref 3.8–10.5)
WBC # FLD AUTO: 6.15 K/UL — SIGNIFICANT CHANGE UP (ref 3.8–10.5)

## 2021-07-27 PROCEDURE — 99285 EMERGENCY DEPT VISIT HI MDM: CPT

## 2021-07-27 PROCEDURE — 74177 CT ABD & PELVIS W/CONTRAST: CPT | Mod: 26

## 2021-07-27 PROCEDURE — 71260 CT THORAX DX C+: CPT | Mod: 26

## 2021-07-27 RX ORDER — CEFTRIAXONE 500 MG/1
1000 INJECTION, POWDER, FOR SOLUTION INTRAMUSCULAR; INTRAVENOUS ONCE
Refills: 0 | Status: COMPLETED | OUTPATIENT
Start: 2021-07-27 | End: 2021-07-27

## 2021-07-27 RX ORDER — SODIUM CHLORIDE 9 MG/ML
2200 INJECTION INTRAMUSCULAR; INTRAVENOUS; SUBCUTANEOUS ONCE
Refills: 0 | Status: COMPLETED | OUTPATIENT
Start: 2021-07-27 | End: 2021-07-27

## 2021-07-27 RX ORDER — ONDANSETRON 8 MG/1
4 TABLET, FILM COATED ORAL ONCE
Refills: 0 | Status: COMPLETED | OUTPATIENT
Start: 2021-07-27 | End: 2021-07-27

## 2021-07-27 RX ORDER — PANTOPRAZOLE SODIUM 20 MG/1
8 TABLET, DELAYED RELEASE ORAL
Qty: 80 | Refills: 0 | Status: DISCONTINUED | OUTPATIENT
Start: 2021-07-27 | End: 2021-07-31

## 2021-07-27 RX ORDER — PANTOPRAZOLE SODIUM 20 MG/1
80 TABLET, DELAYED RELEASE ORAL ONCE
Refills: 0 | Status: COMPLETED | OUTPATIENT
Start: 2021-07-27 | End: 2021-07-27

## 2021-07-27 RX ORDER — MORPHINE SULFATE 50 MG/1
2 CAPSULE, EXTENDED RELEASE ORAL ONCE
Refills: 0 | Status: DISCONTINUED | OUTPATIENT
Start: 2021-07-27 | End: 2021-07-27

## 2021-07-27 RX ADMIN — SODIUM CHLORIDE 2200 MILLILITER(S): 9 INJECTION INTRAMUSCULAR; INTRAVENOUS; SUBCUTANEOUS at 16:51

## 2021-07-27 RX ADMIN — PANTOPRAZOLE SODIUM 80 MILLIGRAM(S): 20 TABLET, DELAYED RELEASE ORAL at 16:51

## 2021-07-27 RX ADMIN — PANTOPRAZOLE SODIUM 10 MG/HR: 20 TABLET, DELAYED RELEASE ORAL at 16:51

## 2021-07-27 RX ADMIN — ONDANSETRON 4 MILLIGRAM(S): 8 TABLET, FILM COATED ORAL at 16:51

## 2021-07-27 RX ADMIN — MORPHINE SULFATE 2 MILLIGRAM(S): 50 CAPSULE, EXTENDED RELEASE ORAL at 18:18

## 2021-07-27 RX ADMIN — CEFTRIAXONE 100 MILLIGRAM(S): 500 INJECTION, POWDER, FOR SOLUTION INTRAMUSCULAR; INTRAVENOUS at 16:51

## 2021-07-27 NOTE — ED ADULT NURSE NOTE - NSIMPLEMENTINTERV_GEN_ALL_ED
Implemented All Universal Safety Interventions:  [place] to call system. Call bell, personal items and telephone within reach. Instruct patient to call for assistance. Room bathroom lighting operational. Non-slip footwear when patient is off stretcher. Physically safe environment: no spills, clutter or unnecessary equipment. Stretcher in lowest position, wheels locked, appropriate side rails in place.

## 2021-07-27 NOTE — ED ADULT TRIAGE NOTE - CHIEF COMPLAINT QUOTE
Pt w/ lung CA presents c/o subjective hematemesis x2 days.  No blood noted in mouth.  Pt endorses blood is bright red.  Pt endorses daily ETOH use of 1 pint per day, states last drink was yesterday.

## 2021-07-27 NOTE — ED PROVIDER NOTE - OBJECTIVE STATEMENT
Pt is a 57yo M with a h/o lung CA who p/w Pt is a 57yo M with a h/o lung CA who p/w nausea and vomiting.  Pt is a heavy etoh user and reports heavy drinking today.  Noted some nausea and vomiting up phlegm.  Pt noted concurrent epigastric pain.  Pt vomiting up food followed by clear phlegm.  After multiple episodes noted some streaks of blood in clear fluid.  Denies coughing up any blood.  Epigastric pain is sharp and non-radiating.  Constant.

## 2021-07-27 NOTE — ED PROVIDER NOTE - CLINICAL SUMMARY MEDICAL DECISION MAKING FREE TEXT BOX
Hematemesis in a patient with a h/o AA.  IV, labs.  Anti-emetic.  PPI bolus and drip.  Pt vomited x 1 here - noted some dark colored blood mixed in clear phlegm/fluid - guaiac sent.  Will perform CT chest/abd/pelvis to r/o any active bleeding in lung and to better evaluate pt's abd pain.

## 2021-07-27 NOTE — ED PROVIDER NOTE - PHYSICAL EXAMINATION
VITAL SIGNS: I have reviewed nursing notes and confirm.  CONSTITUTIONAL: Well-developed; well-nourished; smells of alcohol.   SKIN: Skin is warm and dry, no acute rash.  HEAD: Normocephalic; atraumatic.  EYES: PERRL, EOM intact; conjunctiva and sclera clear.  ENT: No nasal discharge; airway clear.  NECK: Supple; non tender.  CARD: S1, S2 normal; no murmurs, gallops, or rubs. Mild tachycardia - regular.  RESP: No wheezes, rales or rhonchi.  ABD: +TTP in epigastric area and more generally throughout with some guarding.  No Rebound.  Non-distended.   MSK: Normal ROM. No clubbing, cyanosis or edema.  NEURO: Alert, oriented. Grossly unremarkable.  PSYCH: Cooperative, appropriate.

## 2021-07-27 NOTE — ED ADULT NURSE NOTE - OBJECTIVE STATEMENT
c/o generalized abdominal pain and n/v with "bloody" emesis x 1 day, no s/s of distress, no s/s of distress, awaiting provider eval

## 2021-07-28 LAB
CULTURE RESULTS: SIGNIFICANT CHANGE UP
SPECIMEN SOURCE: SIGNIFICANT CHANGE UP

## 2021-07-30 NOTE — ED ADULT NURSE NOTE - NS ED NURSE DISCH DISPOSITION
-Stay on the digoxin    -Lower metoprolol back to 50 mg a.m. and p.m.    -If you notice your atrial fibrillation has come back and is going fast or noticed that you are gaining water weight back up to 140 pounds, let me know.    -The weight/water loss could have been related to better control of your atrial fibrillation allowing extra water to mobilize to the kidneys more frequently.    -Stay on your Eliquis for dental work.   Discharged

## 2021-08-02 LAB
CULTURE RESULTS: SIGNIFICANT CHANGE UP
CULTURE RESULTS: SIGNIFICANT CHANGE UP
SPECIMEN SOURCE: SIGNIFICANT CHANGE UP
SPECIMEN SOURCE: SIGNIFICANT CHANGE UP

## 2021-11-21 ENCOUNTER — EMERGENCY (EMERGENCY)
Facility: HOSPITAL | Age: 56
LOS: 1 days | Discharge: LEFT BEFORE DISCHARGE | End: 2021-11-21
Admitting: EMERGENCY MEDICINE
Payer: MEDICAID

## 2021-11-21 VITALS
SYSTOLIC BLOOD PRESSURE: 125 MMHG | HEART RATE: 78 BPM | HEIGHT: 72 IN | WEIGHT: 149.91 LBS | DIASTOLIC BLOOD PRESSURE: 74 MMHG | TEMPERATURE: 98 F | RESPIRATION RATE: 16 BRPM | OXYGEN SATURATION: 96 %

## 2021-11-21 DIAGNOSIS — M54.50 LOW BACK PAIN, UNSPECIFIED: ICD-10-CM

## 2021-11-21 DIAGNOSIS — Z53.21 PROCEDURE AND TREATMENT NOT CARRIED OUT DUE TO PATIENT LEAVING PRIOR TO BEING SEEN BY HEALTH CARE PROVIDER: ICD-10-CM

## 2021-11-21 LAB — GLUCOSE BLDC GLUCOMTR-MCNC: 67 MG/DL — LOW (ref 70–99)

## 2021-11-21 PROCEDURE — L9991: CPT

## 2021-11-21 NOTE — ED ADULT NURSE NOTE - CHIEF COMPLAINT QUOTE
Pt walked into ED c/o fall in street yesterday. Pt reports drinking before the fall. Presenting with abrasions to nose. c/o Lt hand pain and back pain.  Pt denies drinking or drug use. Dozing off in triage.

## 2021-11-21 NOTE — ED ADULT TRIAGE NOTE - CHIEF COMPLAINT QUOTE
Pt walked into ED c/o fall in street yesterday. Pt reports drinking before the fall. Presenting with abrasions to nose. c/o Lt hand pain and back pain Pt walked into ED c/o fall in street yesterday. Pt reports drinking before the fall. Presenting with abrasions to nose. c/o Lt hand pain and back pain.  Pt denies drinking or drug use. Dozing off in triage.

## 2021-12-01 PROCEDURE — G9005: CPT

## 2022-02-24 NOTE — ED ADULT NURSE NOTE - ATTEMPT TO OOB
Quality 431: Preventive Care And Screening: Unhealthy Alcohol Use - Screening: Patient not identified as an unhealthy alcohol user when screened for unhealthy alcohol use using a systematic screening method
Quality 110: Preventive Care And Screening: Influenza Immunization: Influenza Immunization Administered during Influenza season
Detail Level: Detailed
Quality 226: Preventive Care And Screening: Tobacco Use: Screening And Cessation Intervention: Patient screened for tobacco use and is an ex/non-smoker
Quality 130: Documentation Of Current Medications In The Medical Record: Current Medications Documented
no

## 2022-06-28 NOTE — H&P ADULT - PROBLEM SELECTOR PLAN 3
Anesthesia Type: 1% lidocaine with epinephrine - AG Currently 18 (24 on admission) with CO2 21 (19 on admission). Likely 2/2 alcoholic ketoacidosis. Alcohol level 68. UA with trace ketones   - c/w D5 /hr for now   -Lactate 1.7

## 2022-08-30 NOTE — ED ADULT NURSE NOTE - NS ED NOTE  TALK SOMEONE YN
No Orbicularis Oris Muscle Flap Text: The defect edges were debeveled with a #15 scalpel blade.  Given that the defect affected the competency of the oral sphincter an obicularis oris muscle flap was deemed most appropriate to restore this competency and normal muscle function.  Using a sterile surgical marker, an appropriate flap was drawn incorporating the defect. The area thus outlined was incised with a #15 scalpel blade.

## 2022-10-24 NOTE — H&P ADULT - NSHPPOADEEPVENOUSTHROMB_GEN_A_CORE
Quality 130: Documentation Of Current Medications In The Medical Record: Current Medications Documented
no
Detail Level: Detailed
Quality 110: Preventive Care And Screening: Influenza Immunization: Influenza Immunization not Administered for Documented Reasons.

## 2022-11-11 NOTE — ED PROVIDER NOTE - SKIN, MLM
Skin normal color for race, warm, dry and intact. No evidence of rash.
behavioral health/cardiovascular/sleep

## 2023-01-11 NOTE — ED PROVIDER NOTE - CONSTITUTIONAL DISTRESS
no apparent Cibinqo Pregnancy And Lactation Text: It is unknown if this medication will adversely affect pregnancy or breast feeding.  You should not take this medication if you are currently pregnant or planning a pregnancy or while breastfeeding.

## 2023-01-19 NOTE — ED PROVIDER NOTE - CRITICAL CARE PROVIDED
documentation/direct patient care (not related to procedure)/interpretation of diagnostic studies/consultation with other physicians/additional history taking Pounds Preamble Statement (Weight Entered In Details Tab): Reported Weight in pounds:

## 2023-10-26 NOTE — PROGRESS NOTE ADULT - REASON FOR ADMISSION
chest pain
Render Risk Assessment In Note?: no
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
Detail Level: Simple

## 2025-01-07 NOTE — ED PROVIDER NOTE - CROS ED SKIN ALL NEG
- UpCr in setting of IgAN shows UpCr improved from 18.8g to microalb:Cr 10mg/g   -resolved  -monitor UpCr and microalb:Cr   negative...

## 2025-01-14 NOTE — PATIENT PROFILE ADULT - STATED REASON FOR ADMISSION
[Cessation strategies including cessation program discussed] : Cessation strategies including cessation program discussed [Use of nicotine replacement therapies and other medications discussed] : Use of nicotine replacement therapies and other medications discussed Chest Pain [Yes] : Willing to quit smoking [FreeTextEntry1] : 10 [FreeTextEntry3] : 10

## 2025-02-25 NOTE — PROGRESS NOTE ADULT - PROBLEM SELECTOR PLAN 6
Diet, DASH/TLC:   Sodium & Cholesterol Restricted (02-25-25 @ 04:14) [Active]      
-urine with moderate blood without RBC, may have rhabdomyolysis   - f/u creatine kinase  - c/w aggressive hydration for now

## 2025-03-15 NOTE — ED ADULT NURSE NOTE - BREATHING, MLM
Problem: Chronic Conditions and Co-morbidities  Goal: Patient's chronic conditions and co-morbidity symptoms are monitored and maintained or improved  Outcome: Progressing  Flowsheets (Taken 3/14/2025 2000)  Care Plan - Patient's Chronic Conditions and Co-Morbidity Symptoms are Monitored and Maintained or Improved: Monitor and assess patient's chronic conditions and comorbid symptoms for stability, deterioration, or improvement     Problem: Discharge Planning  Goal: Discharge to home or other facility with appropriate resources  3/15/2025 0513 by Lalitha Matos RN  Outcome: Progressing  Flowsheets (Taken 3/14/2025 2000)  Discharge to home or other facility with appropriate resources: Identify barriers to discharge with patient and caregiver  3/14/2025 1847 by Radha Abreu, RN  Outcome: Progressing  Flowsheets (Taken 3/14/2025 1842)  Discharge to home or other facility with appropriate resources:   Identify barriers to discharge with patient and caregiver   Arrange for needed discharge resources and transportation as appropriate   Identify discharge learning needs (meds, wound care, etc)   Arrange for interpreters to assist at discharge as needed   Refer to discharge planning if patient needs post-hospital services based on physician order or complex needs related to functional status, cognitive ability or social support system     Problem: Pain  Goal: Verbalizes/displays adequate comfort level or baseline comfort level  Outcome: Progressing  Flowsheets (Taken 3/14/2025 2000)  Verbalizes/displays adequate comfort level or baseline comfort level:   Assess pain using appropriate pain scale   Encourage patient to monitor pain and request assistance     Problem: ABCDS Injury Assessment  Goal: Absence of physical injury  3/15/2025 0513 by Lalitha Matos, RN  Outcome: Progressing  3/14/2025 1847 by Radha Abreu, RN  Outcome: Progressing     Problem: Safety - Adult  Goal: Free from fall injury  3/15/2025 0513  Spontaneous, unlabored and symmetrical